# Patient Record
Sex: FEMALE | NOT HISPANIC OR LATINO | Employment: OTHER | URBAN - METROPOLITAN AREA
[De-identification: names, ages, dates, MRNs, and addresses within clinical notes are randomized per-mention and may not be internally consistent; named-entity substitution may affect disease eponyms.]

---

## 2017-06-12 ENCOUNTER — COMMUNICATION - HEALTHEAST (OUTPATIENT)
Dept: INTERNAL MEDICINE | Facility: CLINIC | Age: 62
End: 2017-06-12

## 2017-06-19 ENCOUNTER — OFFICE VISIT - HEALTHEAST (OUTPATIENT)
Dept: INTERNAL MEDICINE | Facility: CLINIC | Age: 62
End: 2017-06-19

## 2017-06-19 DIAGNOSIS — Z79.899 MEDICATION MANAGEMENT: ICD-10-CM

## 2017-06-19 DIAGNOSIS — I10 ESSENTIAL HYPERTENSION WITH GOAL BLOOD PRESSURE LESS THAN 140/90: ICD-10-CM

## 2017-06-19 DIAGNOSIS — Z85.3 HISTORY OF BREAST CANCER: ICD-10-CM

## 2017-06-19 DIAGNOSIS — Z23 NEED FOR VACCINATION: ICD-10-CM

## 2017-06-19 DIAGNOSIS — Z86.0101 HX OF ADENOMATOUS COLONIC POLYPS: ICD-10-CM

## 2017-06-19 DIAGNOSIS — Z00.00 ROUTINE GENERAL MEDICAL EXAMINATION AT A HEALTH CARE FACILITY: ICD-10-CM

## 2017-06-19 LAB
CHOLEST SERPL-MCNC: 195 MG/DL
FASTING STATUS PATIENT QL REPORTED: YES
HDLC SERPL-MCNC: 63 MG/DL
LDLC SERPL CALC-MCNC: 104 MG/DL
TRIGL SERPL-MCNC: 138 MG/DL

## 2017-06-19 ASSESSMENT — MIFFLIN-ST. JEOR: SCORE: 1249.52

## 2017-06-20 ENCOUNTER — AMBULATORY - HEALTHEAST (OUTPATIENT)
Dept: INTERNAL MEDICINE | Facility: CLINIC | Age: 62
End: 2017-06-20

## 2017-06-20 ENCOUNTER — COMMUNICATION - HEALTHEAST (OUTPATIENT)
Dept: INTERNAL MEDICINE | Facility: CLINIC | Age: 62
End: 2017-06-20

## 2017-10-28 ENCOUNTER — COMMUNICATION - HEALTHEAST (OUTPATIENT)
Dept: INTERNAL MEDICINE | Facility: CLINIC | Age: 62
End: 2017-10-28

## 2017-10-28 DIAGNOSIS — E87.6 HYPOKALEMIA: ICD-10-CM

## 2017-12-29 ENCOUNTER — TRANSFERRED RECORDS (OUTPATIENT)
Dept: HEALTH INFORMATION MANAGEMENT | Facility: CLINIC | Age: 62
End: 2017-12-29

## 2018-06-25 ENCOUNTER — AMBULATORY - HEALTHEAST (OUTPATIENT)
Dept: INTERNAL MEDICINE | Facility: CLINIC | Age: 63
End: 2018-06-25

## 2018-06-25 ENCOUNTER — OFFICE VISIT - HEALTHEAST (OUTPATIENT)
Dept: INTERNAL MEDICINE | Facility: CLINIC | Age: 63
End: 2018-06-25

## 2018-06-25 DIAGNOSIS — Z79.899 MEDICATION MANAGEMENT: ICD-10-CM

## 2018-06-25 DIAGNOSIS — E55.9 VITAMIN D DEFICIENCY: ICD-10-CM

## 2018-06-25 DIAGNOSIS — Z00.00 HEALTHCARE MAINTENANCE: ICD-10-CM

## 2018-06-25 DIAGNOSIS — Z00.00 ROUTINE GENERAL MEDICAL EXAMINATION AT A HEALTH CARE FACILITY: ICD-10-CM

## 2018-06-25 DIAGNOSIS — Z85.3 HISTORY OF BREAST CANCER: ICD-10-CM

## 2018-06-25 DIAGNOSIS — I10 ESSENTIAL HYPERTENSION: ICD-10-CM

## 2018-06-25 DIAGNOSIS — Z86.0101 HX OF ADENOMATOUS COLONIC POLYPS: ICD-10-CM

## 2018-06-25 LAB
ALBUMIN SERPL-MCNC: 3.9 G/DL (ref 3.5–5)
ALBUMIN UR-MCNC: NEGATIVE MG/DL
ALP SERPL-CCNC: 96 U/L (ref 45–120)
ALT SERPL W P-5'-P-CCNC: 20 U/L (ref 0–45)
ANION GAP SERPL CALCULATED.3IONS-SCNC: 9 MMOL/L (ref 5–18)
APPEARANCE UR: CLEAR
AST SERPL W P-5'-P-CCNC: 19 U/L (ref 0–40)
ATRIAL RATE - MUSE: 96 BPM
BACTERIA #/AREA URNS HPF: ABNORMAL HPF
BASOPHILS # BLD AUTO: 0.1 THOU/UL (ref 0–0.2)
BASOPHILS NFR BLD AUTO: 2 % (ref 0–2)
BILIRUB SERPL-MCNC: 0.7 MG/DL (ref 0–1)
BILIRUB UR QL STRIP: NEGATIVE
BUN SERPL-MCNC: 13 MG/DL (ref 8–22)
CALCIUM SERPL-MCNC: 9.7 MG/DL (ref 8.5–10.5)
CHLORIDE BLD-SCNC: 106 MMOL/L (ref 98–107)
CHOLEST SERPL-MCNC: 185 MG/DL
CO2 SERPL-SCNC: 27 MMOL/L (ref 22–31)
COLOR UR AUTO: YELLOW
CREAT SERPL-MCNC: 0.78 MG/DL (ref 0.6–1.1)
DIASTOLIC BLOOD PRESSURE - MUSE: NORMAL MMHG
EOSINOPHIL # BLD AUTO: 0.1 THOU/UL (ref 0–0.4)
EOSINOPHIL NFR BLD AUTO: 4 % (ref 0–6)
ERYTHROCYTE [DISTWIDTH] IN BLOOD BY AUTOMATED COUNT: 13.4 % (ref 11–14.5)
FASTING STATUS PATIENT QL REPORTED: YES
GFR SERPL CREATININE-BSD FRML MDRD: >60 ML/MIN/1.73M2
GLUCOSE BLD-MCNC: 110 MG/DL (ref 70–125)
GLUCOSE UR STRIP-MCNC: NEGATIVE MG/DL
HCT VFR BLD AUTO: 46.8 % (ref 35–47)
HDLC SERPL-MCNC: 66 MG/DL
HGB BLD-MCNC: 14.8 G/DL (ref 12–16)
HGB UR QL STRIP: ABNORMAL
INTERPRETATION ECG - MUSE: NORMAL
KETONES UR STRIP-MCNC: NEGATIVE MG/DL
LDLC SERPL CALC-MCNC: 95 MG/DL
LEUKOCYTE ESTERASE UR QL STRIP: NEGATIVE
LYMPHOCYTES # BLD AUTO: 1.2 THOU/UL (ref 0.8–4.4)
LYMPHOCYTES NFR BLD AUTO: 34 % (ref 20–40)
MCH RBC QN AUTO: 28.9 PG (ref 27–34)
MCHC RBC AUTO-ENTMCNC: 31.6 G/DL (ref 32–36)
MCV RBC AUTO: 91 FL (ref 80–100)
MONOCYTES # BLD AUTO: 0.3 THOU/UL (ref 0–0.9)
MONOCYTES NFR BLD AUTO: 9 % (ref 2–10)
NEUTROPHILS # BLD AUTO: 1.8 THOU/UL (ref 2–7.7)
NEUTROPHILS NFR BLD AUTO: 52 % (ref 50–70)
NITRATE UR QL: NEGATIVE
P AXIS - MUSE: 47 DEGREES
PH UR STRIP: 6 [PH] (ref 5–8)
PLATELET # BLD AUTO: 250 THOU/UL (ref 140–440)
PMV BLD AUTO: 9.9 FL (ref 8.5–12.5)
POTASSIUM BLD-SCNC: 3.8 MMOL/L (ref 3.5–5)
PR INTERVAL - MUSE: 116 MS
PROT SERPL-MCNC: 7.6 G/DL (ref 6–8)
QRS DURATION - MUSE: 82 MS
QT - MUSE: 460 MS
QTC - MUSE: 482 MS
R AXIS - MUSE: 37 DEGREES
RBC # BLD AUTO: 5.12 MILL/UL (ref 3.8–5.4)
RBC #/AREA URNS AUTO: ABNORMAL HPF
SODIUM SERPL-SCNC: 142 MMOL/L (ref 136–145)
SP GR UR STRIP: 1.01 (ref 1–1.03)
SQUAMOUS #/AREA URNS AUTO: ABNORMAL LPF
SYSTOLIC BLOOD PRESSURE - MUSE: NORMAL MMHG
T AXIS - MUSE: 133 DEGREES
TRIGL SERPL-MCNC: 119 MG/DL
TSH SERPL DL<=0.005 MIU/L-ACNC: 1.31 UIU/ML (ref 0.3–5)
UROBILINOGEN UR STRIP-ACNC: ABNORMAL
VENTRICULAR RATE- MUSE: 66 BPM
WBC #/AREA URNS AUTO: ABNORMAL HPF
WBC: 3.4 THOU/UL (ref 4–11)

## 2018-06-25 ASSESSMENT — MIFFLIN-ST. JEOR: SCORE: 1263.13

## 2018-06-26 ENCOUNTER — COMMUNICATION - HEALTHEAST (OUTPATIENT)
Dept: INTERNAL MEDICINE | Facility: CLINIC | Age: 63
End: 2018-06-26

## 2018-06-26 ENCOUNTER — AMBULATORY - HEALTHEAST (OUTPATIENT)
Dept: INTERNAL MEDICINE | Facility: CLINIC | Age: 63
End: 2018-06-26

## 2018-06-26 DIAGNOSIS — I25.10 ASHD (ARTERIOSCLEROTIC HEART DISEASE): ICD-10-CM

## 2018-06-26 LAB — 25(OH)D3 SERPL-MCNC: 34.4 NG/ML (ref 30–80)

## 2018-07-10 ENCOUNTER — HOSPITAL ENCOUNTER (OUTPATIENT)
Dept: CARDIOLOGY | Facility: CLINIC | Age: 63
Discharge: HOME | End: 2018-07-10
Attending: INTERNAL MEDICINE

## 2018-07-10 DIAGNOSIS — I25.10 ASHD (ARTERIOSCLEROTIC HEART DISEASE): ICD-10-CM

## 2018-07-10 LAB
AORTIC ROOT: 3.1 CM
BSA FOR ECHO PROCEDURE: 1.82 M2
CV BLOOD PRESSURE: NORMAL MMHG
CV ECHO HEIGHT: 62.8 IN
CV ECHO WEIGHT: 166 LBS
DOP CALC LVOT PEAK VEL: 97.8 CM/S
DOP CALCLVOT PEAK VEL VTI: 21.5 CM
EJECTION FRACTION: 60 %
EJECTION FRACTION: 63 % (ref 55–75)
FRACTIONAL SHORTENING: 44.3 % (ref 28–44)
INTERVENTRICULAR SEPTUM IN END DIASTOLE: 1.21 CM (ref 0.6–0.9)
IVS/PW RATIO: 1.1
LA AREA 1: 13.9 CM2
LA AREA 2: 13.4 CM2
LEFT ATRIUM LENGTH: 4.91 CM
LEFT ATRIUM SIZE: 3.2 CM
LEFT ATRIUM TO AORTIC ROOT RATIO: 1.03 NO UNITS
LEFT ATRIUM VOLUME INDEX: 17.7 ML/M2
LEFT ATRIUM VOLUME: 32.2 ML
LEFT VENTRICLE DIASTOLIC VOLUME INDEX: 25.3 CM3/M2 (ref 34–74)
LEFT VENTRICLE DIASTOLIC VOLUME: 46 CM3 (ref 46–106)
LEFT VENTRICLE HEART RATE: 60 BPM
LEFT VENTRICLE MASS INDEX: 90.9 G/M2
LEFT VENTRICLE SYSTOLIC VOLUME INDEX: 9.3 CM3/M2 (ref 11–31)
LEFT VENTRICLE SYSTOLIC VOLUME: 17 CM3 (ref 14–42)
LEFT VENTRICULAR INTERNAL DIMENSION IN DIASTOLE: 4.22 CM (ref 3.8–5.2)
LEFT VENTRICULAR INTERNAL DIMENSION IN SYSTOLE: 2.35 CM (ref 2.2–3.5)
LEFT VENTRICULAR MASS: 165.5 G
LEFT VENTRICULAR OUTFLOW TRACT MEAN GRADIENT: 3 MMHG
LEFT VENTRICULAR OUTFLOW TRACT MEAN VELOCITY: 80.5 CM/S
LEFT VENTRICULAR OUTFLOW TRACT PEAK GRADIENT: 4 MMHG
LEFT VENTRICULAR POSTERIOR WALL IN END DIASTOLE: 1.06 CM (ref 0.6–0.9)
MITRAL VALVE E/A RATIO: 0.7
MV AVERAGE E/E' RATIO: 7 CM/S
MV DECELERATION TIME: 285 MS
MV E'TISSUE VEL-LAT: 7.12 CM/S
MV E'TISSUE VEL-MED: 9.07 CM/S
MV LATERAL E/E' RATIO: 8
MV MEDIAL E/E' RATIO: 6.3
MV PEAK A VELOCITY: 80.5 CM/S
MV PEAK E VELOCITY: 56.8 CM/S
NUC REST DIASTOLIC VOLUME INDEX: 2656 LBS
NUC REST SYSTOLIC VOLUME INDEX: 62.75 IN
TRICUSPID REGURGITATION PEAK PRESSURE GRADIENT: 17.3 MMHG
TRICUSPID VALVE ANULAR PLANE SYSTOLIC EXCURSION: 2.4 CM
TRICUSPID VALVE PEAK REGURGITANT VELOCITY: 208 CM/S

## 2018-07-10 ASSESSMENT — MIFFLIN-ST. JEOR: SCORE: 1263.13

## 2018-07-19 ENCOUNTER — COMMUNICATION - HEALTHEAST (OUTPATIENT)
Dept: INTERNAL MEDICINE | Facility: CLINIC | Age: 63
End: 2018-07-19

## 2018-09-15 ENCOUNTER — COMMUNICATION - HEALTHEAST (OUTPATIENT)
Dept: INTERNAL MEDICINE | Facility: CLINIC | Age: 63
End: 2018-09-15

## 2019-04-28 ENCOUNTER — COMMUNICATION - HEALTHEAST (OUTPATIENT)
Dept: INTERNAL MEDICINE | Facility: CLINIC | Age: 64
End: 2019-04-28

## 2019-04-28 DIAGNOSIS — I10 ESSENTIAL HYPERTENSION: ICD-10-CM

## 2019-04-29 ENCOUNTER — COMMUNICATION - HEALTHEAST (OUTPATIENT)
Dept: INTERNAL MEDICINE | Facility: CLINIC | Age: 64
End: 2019-04-29

## 2019-06-17 ENCOUNTER — COMMUNICATION - HEALTHEAST (OUTPATIENT)
Dept: INTERNAL MEDICINE | Facility: CLINIC | Age: 64
End: 2019-06-17

## 2019-06-17 ENCOUNTER — OFFICE VISIT - HEALTHEAST (OUTPATIENT)
Dept: INTERNAL MEDICINE | Facility: CLINIC | Age: 64
End: 2019-06-17

## 2019-06-17 DIAGNOSIS — Z86.0101 HX OF ADENOMATOUS COLONIC POLYPS: ICD-10-CM

## 2019-06-17 DIAGNOSIS — I10 ESSENTIAL HYPERTENSION: ICD-10-CM

## 2019-06-17 DIAGNOSIS — L68.0 HIRSUTISM: ICD-10-CM

## 2019-06-17 DIAGNOSIS — Z85.3 HISTORY OF BREAST CANCER: ICD-10-CM

## 2019-06-17 DIAGNOSIS — Z00.00 ROUTINE GENERAL MEDICAL EXAMINATION AT A HEALTH CARE FACILITY: ICD-10-CM

## 2019-06-17 DIAGNOSIS — Z79.899 MEDICATION MANAGEMENT: ICD-10-CM

## 2019-06-17 LAB
ALBUMIN SERPL-MCNC: 4.2 G/DL (ref 3.5–5)
ALBUMIN UR-MCNC: NEGATIVE MG/DL
ALP SERPL-CCNC: 107 U/L (ref 45–120)
ALT SERPL W P-5'-P-CCNC: 22 U/L (ref 0–45)
ANION GAP SERPL CALCULATED.3IONS-SCNC: 12 MMOL/L (ref 5–18)
APPEARANCE UR: CLEAR
AST SERPL W P-5'-P-CCNC: 20 U/L (ref 0–40)
BACTERIA #/AREA URNS HPF: ABNORMAL HPF
BASOPHILS # BLD AUTO: 0.1 THOU/UL (ref 0–0.2)
BASOPHILS NFR BLD AUTO: 2 % (ref 0–2)
BILIRUB SERPL-MCNC: 0.7 MG/DL (ref 0–1)
BILIRUB UR QL STRIP: NEGATIVE
BUN SERPL-MCNC: 13 MG/DL (ref 8–22)
CALCIUM SERPL-MCNC: 10.1 MG/DL (ref 8.5–10.5)
CHLORIDE BLD-SCNC: 102 MMOL/L (ref 98–107)
CHOLEST SERPL-MCNC: 208 MG/DL
CO2 SERPL-SCNC: 27 MMOL/L (ref 22–31)
COLOR UR AUTO: YELLOW
CREAT SERPL-MCNC: 0.84 MG/DL (ref 0.6–1.1)
EOSINOPHIL # BLD AUTO: 0.1 THOU/UL (ref 0–0.4)
EOSINOPHIL NFR BLD AUTO: 2 % (ref 0–6)
ERYTHROCYTE [DISTWIDTH] IN BLOOD BY AUTOMATED COUNT: 13.4 % (ref 11–14.5)
FASTING STATUS PATIENT QL REPORTED: YES
GFR SERPL CREATININE-BSD FRML MDRD: >60 ML/MIN/1.73M2
GLUCOSE BLD-MCNC: 114 MG/DL (ref 70–125)
GLUCOSE UR STRIP-MCNC: NEGATIVE MG/DL
HCT VFR BLD AUTO: 48.8 % (ref 35–47)
HDLC SERPL-MCNC: 71 MG/DL
HGB BLD-MCNC: 15.6 G/DL (ref 12–16)
HGB UR QL STRIP: ABNORMAL
KETONES UR STRIP-MCNC: NEGATIVE MG/DL
LDLC SERPL CALC-MCNC: 114 MG/DL
LEUKOCYTE ESTERASE UR QL STRIP: NEGATIVE
LYMPHOCYTES # BLD AUTO: 1.2 THOU/UL (ref 0.8–4.4)
LYMPHOCYTES NFR BLD AUTO: 36 % (ref 20–40)
MCH RBC QN AUTO: 28.8 PG (ref 27–34)
MCHC RBC AUTO-ENTMCNC: 32 G/DL (ref 32–36)
MCV RBC AUTO: 90 FL (ref 80–100)
MONOCYTES # BLD AUTO: 0.3 THOU/UL (ref 0–0.9)
MONOCYTES NFR BLD AUTO: 7 % (ref 2–10)
NEUTROPHILS # BLD AUTO: 1.8 THOU/UL (ref 2–7.7)
NEUTROPHILS NFR BLD AUTO: 53 % (ref 50–70)
NITRATE UR QL: NEGATIVE
PH UR STRIP: 6.5 [PH] (ref 5–8)
PLATELET # BLD AUTO: 280 THOU/UL (ref 140–440)
PMV BLD AUTO: 9.6 FL (ref 8.5–12.5)
POTASSIUM BLD-SCNC: 3.2 MMOL/L (ref 3.5–5)
PROT SERPL-MCNC: 7.9 G/DL (ref 6–8)
RBC # BLD AUTO: 5.41 MILL/UL (ref 3.8–5.4)
RBC #/AREA URNS AUTO: ABNORMAL HPF
SODIUM SERPL-SCNC: 141 MMOL/L (ref 136–145)
SP GR UR STRIP: 1.01 (ref 1–1.03)
SQUAMOUS #/AREA URNS AUTO: ABNORMAL LPF
TRIGL SERPL-MCNC: 115 MG/DL
TSH SERPL DL<=0.005 MIU/L-ACNC: 0.93 UIU/ML (ref 0.3–5)
UROBILINOGEN UR STRIP-ACNC: ABNORMAL
WBC #/AREA URNS AUTO: ABNORMAL HPF
WBC: 3.5 THOU/UL (ref 4–11)

## 2019-06-17 ASSESSMENT — MIFFLIN-ST. JEOR: SCORE: 1250.09

## 2019-06-18 ENCOUNTER — COMMUNICATION - HEALTHEAST (OUTPATIENT)
Dept: INTERNAL MEDICINE | Facility: CLINIC | Age: 64
End: 2019-06-18

## 2019-07-31 ENCOUNTER — COMMUNICATION - HEALTHEAST (OUTPATIENT)
Dept: INTERNAL MEDICINE | Facility: CLINIC | Age: 64
End: 2019-07-31

## 2019-07-31 DIAGNOSIS — I10 ESSENTIAL HYPERTENSION: ICD-10-CM

## 2019-08-16 ENCOUNTER — TRANSFERRED RECORDS (OUTPATIENT)
Dept: HEALTH INFORMATION MANAGEMENT | Facility: CLINIC | Age: 64
End: 2019-08-16

## 2019-08-16 ENCOUNTER — RECORDS - HEALTHEAST (OUTPATIENT)
Dept: ADMINISTRATIVE | Facility: OTHER | Age: 64
End: 2019-08-16

## 2019-08-21 ENCOUNTER — COMMUNICATION - HEALTHEAST (OUTPATIENT)
Dept: INTERNAL MEDICINE | Facility: CLINIC | Age: 64
End: 2019-08-21

## 2019-08-23 ENCOUNTER — TRANSFERRED RECORDS (OUTPATIENT)
Dept: HEALTH INFORMATION MANAGEMENT | Facility: CLINIC | Age: 64
End: 2019-08-23

## 2019-08-23 ENCOUNTER — RECORDS - HEALTHEAST (OUTPATIENT)
Dept: ADMINISTRATIVE | Facility: OTHER | Age: 64
End: 2019-08-23

## 2019-08-26 ENCOUNTER — RECORDS - HEALTHEAST (OUTPATIENT)
Dept: ADMINISTRATIVE | Facility: OTHER | Age: 64
End: 2019-08-26

## 2019-08-29 ENCOUNTER — AMBULATORY - HEALTHEAST (OUTPATIENT)
Dept: INTERNAL MEDICINE | Facility: CLINIC | Age: 64
End: 2019-08-29

## 2019-08-29 ENCOUNTER — COMMUNICATION - HEALTHEAST (OUTPATIENT)
Dept: SURGERY | Facility: CLINIC | Age: 64
End: 2019-08-29

## 2019-08-29 DIAGNOSIS — C50.912 MALIGNANT NEOPLASM OF LEFT FEMALE BREAST, UNSPECIFIED ESTROGEN RECEPTOR STATUS, UNSPECIFIED SITE OF BREAST (H): ICD-10-CM

## 2019-08-30 ENCOUNTER — RECORDS - HEALTHEAST (OUTPATIENT)
Dept: RADIOLOGY | Facility: CLINIC | Age: 64
End: 2019-08-30

## 2019-08-30 ENCOUNTER — RECORDS - HEALTHEAST (OUTPATIENT)
Dept: ADMINISTRATIVE | Facility: OTHER | Age: 64
End: 2019-08-30

## 2019-09-10 ENCOUNTER — COMMUNICATION - HEALTHEAST (OUTPATIENT)
Dept: SURGERY | Facility: CLINIC | Age: 64
End: 2019-09-10

## 2019-09-10 ENCOUNTER — HOSPITAL ENCOUNTER (OUTPATIENT)
Dept: SURGERY | Facility: CLINIC | Age: 64
Discharge: HOME | End: 2019-09-10
Attending: INTERNAL MEDICINE

## 2019-09-10 DIAGNOSIS — Z17.0 MALIGNANT NEOPLASM OF UPPER-OUTER QUADRANT OF LEFT BREAST IN FEMALE, ESTROGEN RECEPTOR POSITIVE (H): ICD-10-CM

## 2019-09-10 DIAGNOSIS — C50.412 MALIGNANT NEOPLASM OF UPPER-OUTER QUADRANT OF LEFT BREAST IN FEMALE, ESTROGEN RECEPTOR POSITIVE (H): ICD-10-CM

## 2019-09-10 ASSESSMENT — MIFFLIN-ST. JEOR: SCORE: 1235.57

## 2019-09-16 ENCOUNTER — COMMUNICATION - HEALTHEAST (OUTPATIENT)
Dept: SURGERY | Facility: CLINIC | Age: 64
End: 2019-09-16

## 2019-09-20 ASSESSMENT — MIFFLIN-ST. JEOR: SCORE: 1231.95

## 2019-09-23 ENCOUNTER — OFFICE VISIT - HEALTHEAST (OUTPATIENT)
Dept: INTERNAL MEDICINE | Facility: CLINIC | Age: 64
End: 2019-09-23

## 2019-09-23 ENCOUNTER — COMMUNICATION - HEALTHEAST (OUTPATIENT)
Dept: SURGERY | Facility: CLINIC | Age: 64
End: 2019-09-23

## 2019-09-23 DIAGNOSIS — C50.919 MALIGNANT NEOPLASM OF FEMALE BREAST, UNSPECIFIED ESTROGEN RECEPTOR STATUS, UNSPECIFIED LATERALITY, UNSPECIFIED SITE OF BREAST (H): ICD-10-CM

## 2019-09-23 DIAGNOSIS — I10 ESSENTIAL HYPERTENSION: ICD-10-CM

## 2019-09-23 DIAGNOSIS — Z01.818 PREOP GENERAL PHYSICAL EXAM: ICD-10-CM

## 2019-09-23 LAB
ANION GAP SERPL CALCULATED.3IONS-SCNC: 9 MMOL/L (ref 5–18)
ATRIAL RATE - MUSE: 62 BPM
BUN SERPL-MCNC: 16 MG/DL (ref 8–22)
CALCIUM SERPL-MCNC: 10.1 MG/DL (ref 8.5–10.5)
CHLORIDE BLD-SCNC: 105 MMOL/L (ref 98–107)
CO2 SERPL-SCNC: 28 MMOL/L (ref 22–31)
CREAT SERPL-MCNC: 0.94 MG/DL (ref 0.6–1.1)
DIASTOLIC BLOOD PRESSURE - MUSE: NORMAL
ERYTHROCYTE [DISTWIDTH] IN BLOOD BY AUTOMATED COUNT: 13.2 % (ref 11–14.5)
GFR SERPL CREATININE-BSD FRML MDRD: 60 ML/MIN/1.73M2
GLUCOSE BLD-MCNC: 113 MG/DL (ref 70–125)
HCT VFR BLD AUTO: 48.2 % (ref 35–47)
HGB BLD-MCNC: 16 G/DL (ref 12–16)
INTERPRETATION ECG - MUSE: NORMAL
MCH RBC QN AUTO: 29.6 PG (ref 27–34)
MCHC RBC AUTO-ENTMCNC: 33.1 G/DL (ref 32–36)
MCV RBC AUTO: 89 FL (ref 80–100)
P AXIS - MUSE: 48 DEGREES
PLATELET # BLD AUTO: 261 THOU/UL (ref 140–440)
PMV BLD AUTO: 7.7 FL (ref 7–10)
POTASSIUM BLD-SCNC: 3.6 MMOL/L (ref 3.5–5)
PR INTERVAL - MUSE: 120 MS
QRS DURATION - MUSE: 88 MS
QT - MUSE: 436 MS
QTC - MUSE: 442 MS
R AXIS - MUSE: 23 DEGREES
RBC # BLD AUTO: 5.39 MILL/UL (ref 3.8–5.4)
SODIUM SERPL-SCNC: 142 MMOL/L (ref 136–145)
SYSTOLIC BLOOD PRESSURE - MUSE: NORMAL
T AXIS - MUSE: 101 DEGREES
VENTRICULAR RATE- MUSE: 62 BPM
WBC: 3.5 THOU/UL (ref 4–11)

## 2019-09-23 ASSESSMENT — MIFFLIN-ST. JEOR: SCORE: 1227.41

## 2019-09-24 ENCOUNTER — COMMUNICATION - HEALTHEAST (OUTPATIENT)
Dept: INTERNAL MEDICINE | Facility: CLINIC | Age: 64
End: 2019-09-24

## 2019-09-24 ENCOUNTER — ANESTHESIA - HEALTHEAST (OUTPATIENT)
Dept: SURGERY | Facility: AMBULATORY SURGERY CENTER | Age: 64
End: 2019-09-24

## 2019-09-25 ENCOUNTER — HOSPITAL ENCOUNTER (OUTPATIENT)
Dept: MAMMOGRAPHY | Facility: CLINIC | Age: 64
Discharge: HOME | End: 2019-09-25
Attending: SPECIALIST

## 2019-09-25 ENCOUNTER — SURGERY - HEALTHEAST (OUTPATIENT)
Dept: SURGERY | Facility: AMBULATORY SURGERY CENTER | Age: 64
End: 2019-09-25

## 2019-09-25 ENCOUNTER — HOSPITAL ENCOUNTER (OUTPATIENT)
Dept: NUCLEAR MEDICINE | Facility: HOSPITAL | Age: 64
Discharge: HOME | End: 2019-09-25
Attending: SPECIALIST | Admitting: RADIOLOGY

## 2019-09-25 DIAGNOSIS — C50.412 MALIGNANT NEOPLASM OF UPPER-OUTER QUADRANT OF LEFT BREAST IN FEMALE, ESTROGEN RECEPTOR POSITIVE (H): ICD-10-CM

## 2019-09-25 DIAGNOSIS — Z17.0 MALIGNANT NEOPLASM OF UPPER-OUTER QUADRANT OF LEFT BREAST IN FEMALE, ESTROGEN RECEPTOR POSITIVE (H): ICD-10-CM

## 2019-09-25 ASSESSMENT — MIFFLIN-ST. JEOR: SCORE: 1231.95

## 2019-09-30 ENCOUNTER — AMBULATORY - HEALTHEAST (OUTPATIENT)
Dept: SURGERY | Facility: CLINIC | Age: 64
End: 2019-09-30

## 2019-09-30 ENCOUNTER — COMMUNICATION - HEALTHEAST (OUTPATIENT)
Dept: SURGERY | Facility: CLINIC | Age: 64
End: 2019-09-30

## 2019-10-01 ENCOUNTER — COMMUNICATION - HEALTHEAST (OUTPATIENT)
Dept: SCHEDULING | Facility: CLINIC | Age: 64
End: 2019-10-01

## 2019-10-01 ENCOUNTER — RECORDS - HEALTHEAST (OUTPATIENT)
Dept: ADMINISTRATIVE | Facility: OTHER | Age: 64
End: 2019-10-01

## 2019-10-02 ENCOUNTER — AMBULATORY - HEALTHEAST (OUTPATIENT)
Dept: SURGERY | Facility: CLINIC | Age: 64
End: 2019-10-02

## 2019-10-08 ENCOUNTER — COMMUNICATION - HEALTHEAST (OUTPATIENT)
Dept: ONCOLOGY | Facility: CLINIC | Age: 64
End: 2019-10-08

## 2019-10-08 ENCOUNTER — HOSPITAL ENCOUNTER (OUTPATIENT)
Dept: SURGERY | Facility: CLINIC | Age: 64
Discharge: HOME | End: 2019-10-08
Attending: SPECIALIST

## 2019-10-08 DIAGNOSIS — Z17.0 MALIGNANT NEOPLASM OF UPPER-OUTER QUADRANT OF LEFT BREAST IN FEMALE, ESTROGEN RECEPTOR POSITIVE (H): ICD-10-CM

## 2019-10-08 DIAGNOSIS — C50.412 MALIGNANT NEOPLASM OF UPPER-OUTER QUADRANT OF LEFT BREAST IN FEMALE, ESTROGEN RECEPTOR POSITIVE (H): ICD-10-CM

## 2019-10-15 ENCOUNTER — AMBULATORY - HEALTHEAST (OUTPATIENT)
Dept: ONCOLOGY | Facility: CLINIC | Age: 64
End: 2019-10-15

## 2019-10-15 ENCOUNTER — OFFICE VISIT - HEALTHEAST (OUTPATIENT)
Dept: ONCOLOGY | Facility: CLINIC | Age: 64
End: 2019-10-15

## 2019-10-15 DIAGNOSIS — Z17.0 MALIGNANT NEOPLASM OF UPPER-OUTER QUADRANT OF LEFT BREAST IN FEMALE, ESTROGEN RECEPTOR POSITIVE (H): ICD-10-CM

## 2019-10-15 DIAGNOSIS — C50.412 MALIGNANT NEOPLASM OF UPPER-OUTER QUADRANT OF LEFT BREAST IN FEMALE, ESTROGEN RECEPTOR POSITIVE (H): ICD-10-CM

## 2019-10-15 ASSESSMENT — MIFFLIN-ST. JEOR: SCORE: 1239.89

## 2019-10-17 ENCOUNTER — HOSPITAL ENCOUNTER (OUTPATIENT)
Dept: PET IMAGING | Facility: HOSPITAL | Age: 64
Setting detail: RADIATION/ONCOLOGY SERIES
Discharge: STILL ADMITTED OR ADMINISTRATIVE | End: 2019-10-17
Attending: INTERNAL MEDICINE

## 2019-10-17 DIAGNOSIS — Z17.0 MALIGNANT NEOPLASM OF UPPER-OUTER QUADRANT OF LEFT BREAST IN FEMALE, ESTROGEN RECEPTOR POSITIVE (H): ICD-10-CM

## 2019-10-17 DIAGNOSIS — C50.412 MALIGNANT NEOPLASM OF UPPER-OUTER QUADRANT OF LEFT BREAST IN FEMALE, ESTROGEN RECEPTOR POSITIVE (H): ICD-10-CM

## 2019-10-17 LAB — GLUCOSE BLDC GLUCOMTR-MCNC: 92 MG/DL (ref 70–139)

## 2019-10-22 ENCOUNTER — COMMUNICATION - HEALTHEAST (OUTPATIENT)
Dept: ADMINISTRATIVE | Facility: HOSPITAL | Age: 64
End: 2019-10-22

## 2019-10-22 ENCOUNTER — HOSPITAL ENCOUNTER (OUTPATIENT)
Dept: SURGERY | Facility: CLINIC | Age: 64
Discharge: HOME | End: 2019-10-22
Attending: SPECIALIST

## 2019-10-22 DIAGNOSIS — Z17.0 MALIGNANT NEOPLASM OF UPPER-OUTER QUADRANT OF LEFT BREAST IN FEMALE, ESTROGEN RECEPTOR POSITIVE (H): ICD-10-CM

## 2019-10-22 DIAGNOSIS — C50.412 MALIGNANT NEOPLASM OF UPPER-OUTER QUADRANT OF LEFT BREAST IN FEMALE, ESTROGEN RECEPTOR POSITIVE (H): ICD-10-CM

## 2019-10-23 ENCOUNTER — AMBULATORY - HEALTHEAST (OUTPATIENT)
Dept: ONCOLOGY | Facility: CLINIC | Age: 64
End: 2019-10-23

## 2019-10-23 ENCOUNTER — COMMUNICATION - HEALTHEAST (OUTPATIENT)
Dept: OTHER | Facility: CLINIC | Age: 64
End: 2019-10-23

## 2019-10-23 ENCOUNTER — OFFICE VISIT - HEALTHEAST (OUTPATIENT)
Dept: RADIATION ONCOLOGY | Facility: CLINIC | Age: 64
End: 2019-10-23

## 2019-10-23 ENCOUNTER — COMMUNICATION - HEALTHEAST (OUTPATIENT)
Dept: ONCOLOGY | Facility: CLINIC | Age: 64
End: 2019-10-23

## 2019-10-23 DIAGNOSIS — C50.412 MALIGNANT NEOPLASM OF UPPER-OUTER QUADRANT OF LEFT BREAST IN FEMALE, ESTROGEN RECEPTOR POSITIVE (H): ICD-10-CM

## 2019-10-23 DIAGNOSIS — Z17.0 MALIGNANT NEOPLASM OF UPPER-OUTER QUADRANT OF LEFT BREAST IN FEMALE, ESTROGEN RECEPTOR POSITIVE (H): ICD-10-CM

## 2019-10-23 DIAGNOSIS — C50.919 BREAST CANCER (H): ICD-10-CM

## 2019-10-29 ENCOUNTER — COMMUNICATION - HEALTHEAST (OUTPATIENT)
Dept: OTHER | Facility: CLINIC | Age: 64
End: 2019-10-29

## 2019-10-29 ENCOUNTER — OFFICE VISIT - HEALTHEAST (OUTPATIENT)
Dept: INTERNAL MEDICINE | Facility: CLINIC | Age: 64
End: 2019-10-29

## 2019-10-29 DIAGNOSIS — I10 ESSENTIAL HYPERTENSION: ICD-10-CM

## 2019-10-29 DIAGNOSIS — R30.0 DYSURIA: ICD-10-CM

## 2019-10-29 LAB
ALBUMIN UR-MCNC: NEGATIVE MG/DL
APPEARANCE UR: ABNORMAL
BILIRUB UR QL STRIP: NEGATIVE
COLOR UR AUTO: YELLOW
GLUCOSE UR STRIP-MCNC: NEGATIVE MG/DL
HGB UR QL STRIP: NEGATIVE
KETONES UR STRIP-MCNC: NEGATIVE MG/DL
LEUKOCYTE ESTERASE UR QL STRIP: NEGATIVE
NITRATE UR QL: NEGATIVE
PH UR STRIP: 7 [PH] (ref 5–8)
SP GR UR STRIP: 1.01 (ref 1–1.03)
UROBILINOGEN UR STRIP-ACNC: ABNORMAL

## 2019-10-29 ASSESSMENT — MIFFLIN-ST. JEOR: SCORE: 1221.74

## 2019-10-31 ENCOUNTER — COMMUNICATION - HEALTHEAST (OUTPATIENT)
Dept: INTERNAL MEDICINE | Facility: CLINIC | Age: 64
End: 2019-10-31

## 2019-10-31 ENCOUNTER — AMBULATORY - HEALTHEAST (OUTPATIENT)
Dept: RADIATION ONCOLOGY | Facility: HOSPITAL | Age: 64
End: 2019-10-31

## 2019-10-31 DIAGNOSIS — I10 ESSENTIAL HYPERTENSION: ICD-10-CM

## 2019-10-31 DIAGNOSIS — Z17.0 MALIGNANT NEOPLASM OF UPPER-OUTER QUADRANT OF LEFT BREAST IN FEMALE, ESTROGEN RECEPTOR POSITIVE (H): ICD-10-CM

## 2019-10-31 DIAGNOSIS — C50.412 MALIGNANT NEOPLASM OF UPPER-OUTER QUADRANT OF LEFT BREAST IN FEMALE, ESTROGEN RECEPTOR POSITIVE (H): ICD-10-CM

## 2019-11-05 ENCOUNTER — COMMUNICATION - HEALTHEAST (OUTPATIENT)
Dept: ONCOLOGY | Facility: CLINIC | Age: 64
End: 2019-11-05

## 2019-11-05 ENCOUNTER — COMMUNICATION - HEALTHEAST (OUTPATIENT)
Dept: OTHER | Facility: CLINIC | Age: 64
End: 2019-11-05

## 2019-11-06 ENCOUNTER — COMMUNICATION - HEALTHEAST (OUTPATIENT)
Dept: ADMINISTRATIVE | Facility: HOSPITAL | Age: 64
End: 2019-11-06

## 2019-11-12 ENCOUNTER — AMBULATORY - HEALTHEAST (OUTPATIENT)
Dept: RADIATION ONCOLOGY | Facility: HOSPITAL | Age: 64
End: 2019-11-12

## 2019-11-13 ENCOUNTER — AMBULATORY - HEALTHEAST (OUTPATIENT)
Dept: OTHER | Facility: CLINIC | Age: 64
End: 2019-11-13

## 2019-11-14 ENCOUNTER — COMMUNICATION - HEALTHEAST (OUTPATIENT)
Dept: OTHER | Facility: CLINIC | Age: 64
End: 2019-11-14

## 2019-11-15 ENCOUNTER — OFFICE VISIT - HEALTHEAST (OUTPATIENT)
Dept: PHYSICAL THERAPY | Facility: REHABILITATION | Age: 64
End: 2019-11-15

## 2019-11-15 DIAGNOSIS — M25.612 DECREASED RANGE OF MOTION OF SHOULDER, LEFT: ICD-10-CM

## 2019-11-15 DIAGNOSIS — I89.0 LYMPHEDEMA: ICD-10-CM

## 2019-11-19 ENCOUNTER — COMMUNICATION - HEALTHEAST (OUTPATIENT)
Dept: ONCOLOGY | Facility: CLINIC | Age: 64
End: 2019-11-19

## 2019-11-19 ENCOUNTER — OFFICE VISIT - HEALTHEAST (OUTPATIENT)
Dept: RADIATION ONCOLOGY | Facility: CLINIC | Age: 64
End: 2019-11-19

## 2019-11-19 ENCOUNTER — AMBULATORY - HEALTHEAST (OUTPATIENT)
Dept: ONCOLOGY | Facility: CLINIC | Age: 64
End: 2019-11-19

## 2019-11-19 DIAGNOSIS — R93.7 ABNORMAL BONE DENSITY SCREENING: ICD-10-CM

## 2019-11-19 DIAGNOSIS — C50.412 MALIGNANT NEOPLASM OF UPPER-OUTER QUADRANT OF LEFT BREAST IN FEMALE, ESTROGEN RECEPTOR POSITIVE (H): ICD-10-CM

## 2019-11-19 DIAGNOSIS — Z17.0 MALIGNANT NEOPLASM OF UPPER-OUTER QUADRANT OF LEFT BREAST IN FEMALE, ESTROGEN RECEPTOR POSITIVE (H): ICD-10-CM

## 2019-11-20 ENCOUNTER — AMBULATORY - HEALTHEAST (OUTPATIENT)
Dept: OTHER | Facility: CLINIC | Age: 64
End: 2019-11-20

## 2019-11-20 ENCOUNTER — COMMUNICATION - HEALTHEAST (OUTPATIENT)
Dept: ADMINISTRATIVE | Facility: HOSPITAL | Age: 64
End: 2019-11-20

## 2019-11-20 ENCOUNTER — OFFICE VISIT - HEALTHEAST (OUTPATIENT)
Dept: PHYSICAL THERAPY | Facility: REHABILITATION | Age: 64
End: 2019-11-20

## 2019-11-20 DIAGNOSIS — M25.612 DECREASED RANGE OF MOTION OF SHOULDER, LEFT: ICD-10-CM

## 2019-11-20 DIAGNOSIS — C50.412 MALIGNANT NEOPLASM OF UPPER-OUTER QUADRANT OF LEFT FEMALE BREAST (H): ICD-10-CM

## 2019-11-20 DIAGNOSIS — Z17.0 ESTROGEN RECEPTOR POSITIVE: ICD-10-CM

## 2019-11-20 DIAGNOSIS — I89.0 LYMPHEDEMA: ICD-10-CM

## 2019-11-21 ENCOUNTER — COMMUNICATION - HEALTHEAST (OUTPATIENT)
Dept: SURGERY | Facility: CLINIC | Age: 64
End: 2019-11-21

## 2019-11-25 ENCOUNTER — COMMUNICATION - HEALTHEAST (OUTPATIENT)
Dept: SURGERY | Facility: CLINIC | Age: 64
End: 2019-11-25

## 2019-11-26 ENCOUNTER — COMMUNICATION - HEALTHEAST (OUTPATIENT)
Dept: INTERNAL MEDICINE | Facility: CLINIC | Age: 64
End: 2019-11-26

## 2019-11-26 ENCOUNTER — OFFICE VISIT - HEALTHEAST (OUTPATIENT)
Dept: RADIATION ONCOLOGY | Facility: CLINIC | Age: 64
End: 2019-11-26

## 2019-11-26 ENCOUNTER — AMBULATORY - HEALTHEAST (OUTPATIENT)
Dept: INTERNAL MEDICINE | Facility: CLINIC | Age: 64
End: 2019-11-26

## 2019-11-26 DIAGNOSIS — I10 ESSENTIAL HYPERTENSION: ICD-10-CM

## 2019-11-26 DIAGNOSIS — C50.412 MALIGNANT NEOPLASM OF UPPER-OUTER QUADRANT OF LEFT BREAST IN FEMALE, ESTROGEN RECEPTOR POSITIVE (H): ICD-10-CM

## 2019-11-26 DIAGNOSIS — Z17.0 MALIGNANT NEOPLASM OF UPPER-OUTER QUADRANT OF LEFT BREAST IN FEMALE, ESTROGEN RECEPTOR POSITIVE (H): ICD-10-CM

## 2019-12-03 ENCOUNTER — AMBULATORY - HEALTHEAST (OUTPATIENT)
Dept: ONCOLOGY | Facility: CLINIC | Age: 64
End: 2019-12-03

## 2019-12-03 ENCOUNTER — HOSPITAL ENCOUNTER (OUTPATIENT)
Dept: SURGERY | Facility: CLINIC | Age: 64
Discharge: HOME | End: 2019-12-03

## 2019-12-03 ENCOUNTER — OFFICE VISIT - HEALTHEAST (OUTPATIENT)
Dept: RADIATION ONCOLOGY | Facility: CLINIC | Age: 64
End: 2019-12-03

## 2019-12-03 DIAGNOSIS — Z17.0 MALIGNANT NEOPLASM OF UPPER-OUTER QUADRANT OF LEFT BREAST IN FEMALE, ESTROGEN RECEPTOR POSITIVE (H): ICD-10-CM

## 2019-12-03 DIAGNOSIS — C50.412 MALIGNANT NEOPLASM OF UPPER-OUTER QUADRANT OF LEFT BREAST IN FEMALE, ESTROGEN RECEPTOR POSITIVE (H): ICD-10-CM

## 2019-12-09 ENCOUNTER — OFFICE VISIT - HEALTHEAST (OUTPATIENT)
Dept: PHYSICAL THERAPY | Facility: REHABILITATION | Age: 64
End: 2019-12-09

## 2019-12-09 DIAGNOSIS — I89.0 LYMPHEDEMA: ICD-10-CM

## 2019-12-09 DIAGNOSIS — M25.612 DECREASED RANGE OF MOTION OF SHOULDER, LEFT: ICD-10-CM

## 2019-12-10 ENCOUNTER — OFFICE VISIT - HEALTHEAST (OUTPATIENT)
Dept: RADIATION ONCOLOGY | Facility: CLINIC | Age: 64
End: 2019-12-10

## 2019-12-10 DIAGNOSIS — C50.412 MALIGNANT NEOPLASM OF UPPER-OUTER QUADRANT OF LEFT BREAST IN FEMALE, ESTROGEN RECEPTOR POSITIVE (H): ICD-10-CM

## 2019-12-10 DIAGNOSIS — Z17.0 MALIGNANT NEOPLASM OF UPPER-OUTER QUADRANT OF LEFT BREAST IN FEMALE, ESTROGEN RECEPTOR POSITIVE (H): ICD-10-CM

## 2019-12-12 ENCOUNTER — OFFICE VISIT - HEALTHEAST (OUTPATIENT)
Dept: PHYSICAL THERAPY | Facility: REHABILITATION | Age: 64
End: 2019-12-12

## 2019-12-12 DIAGNOSIS — I89.0 LYMPHEDEMA: ICD-10-CM

## 2019-12-12 DIAGNOSIS — M25.612 DECREASED RANGE OF MOTION OF SHOULDER, LEFT: ICD-10-CM

## 2019-12-16 ENCOUNTER — OFFICE VISIT - HEALTHEAST (OUTPATIENT)
Dept: PHYSICAL THERAPY | Facility: REHABILITATION | Age: 64
End: 2019-12-16

## 2019-12-16 ENCOUNTER — AMBULATORY - HEALTHEAST (OUTPATIENT)
Dept: RADIATION ONCOLOGY | Facility: CLINIC | Age: 64
End: 2019-12-16

## 2019-12-16 DIAGNOSIS — I89.0 LYMPHEDEMA: ICD-10-CM

## 2019-12-16 DIAGNOSIS — M25.612 DECREASED RANGE OF MOTION OF SHOULDER, LEFT: ICD-10-CM

## 2019-12-17 ENCOUNTER — RECORDS - HEALTHEAST (OUTPATIENT)
Dept: ADMINISTRATIVE | Facility: OTHER | Age: 64
End: 2019-12-17

## 2019-12-17 ENCOUNTER — TRANSFERRED RECORDS (OUTPATIENT)
Dept: MULTI SPECIALTY CLINIC | Facility: CLINIC | Age: 64
End: 2019-12-17

## 2019-12-17 ENCOUNTER — OFFICE VISIT - HEALTHEAST (OUTPATIENT)
Dept: RADIATION ONCOLOGY | Facility: CLINIC | Age: 64
End: 2019-12-17

## 2019-12-17 ENCOUNTER — RECORDS - HEALTHEAST (OUTPATIENT)
Dept: BONE DENSITY | Facility: CLINIC | Age: 64
End: 2019-12-17

## 2019-12-17 DIAGNOSIS — C50.412 MALIGNANT NEOPLASM OF UPPER-OUTER QUADRANT OF LEFT BREAST IN FEMALE, ESTROGEN RECEPTOR POSITIVE (H): ICD-10-CM

## 2019-12-17 DIAGNOSIS — Z17.0 MALIGNANT NEOPLASM OF UPPER-OUTER QUADRANT OF LEFT BREAST IN FEMALE, ESTROGEN RECEPTOR POSITIVE (H): ICD-10-CM

## 2019-12-17 DIAGNOSIS — R93.7 ABNORMAL FINDINGS ON DIAGNOSTIC IMAGING OF OTHER PARTS OF MUSCULOSKELETAL SYSTEM: ICD-10-CM

## 2019-12-18 ENCOUNTER — OFFICE VISIT - HEALTHEAST (OUTPATIENT)
Dept: PHYSICAL THERAPY | Facility: REHABILITATION | Age: 64
End: 2019-12-18

## 2019-12-18 DIAGNOSIS — M25.612 DECREASED RANGE OF MOTION OF SHOULDER, LEFT: ICD-10-CM

## 2019-12-18 DIAGNOSIS — I89.0 LYMPHEDEMA: ICD-10-CM

## 2019-12-19 ENCOUNTER — OFFICE VISIT - HEALTHEAST (OUTPATIENT)
Dept: ONCOLOGY | Facility: CLINIC | Age: 64
End: 2019-12-19

## 2019-12-19 DIAGNOSIS — C50.412 MALIGNANT NEOPLASM OF UPPER-OUTER QUADRANT OF LEFT BREAST IN FEMALE, ESTROGEN RECEPTOR POSITIVE (H): ICD-10-CM

## 2019-12-19 DIAGNOSIS — E56.9 VITAMIN DEFICIENCY: ICD-10-CM

## 2019-12-19 DIAGNOSIS — Z17.0 MALIGNANT NEOPLASM OF UPPER-OUTER QUADRANT OF LEFT BREAST IN FEMALE, ESTROGEN RECEPTOR POSITIVE (H): ICD-10-CM

## 2019-12-20 ENCOUNTER — COMMUNICATION - HEALTHEAST (OUTPATIENT)
Dept: ONCOLOGY | Facility: CLINIC | Age: 64
End: 2019-12-20

## 2019-12-20 LAB — 25(OH)D3 SERPL-MCNC: 31 NG/ML (ref 30–80)

## 2019-12-23 ENCOUNTER — OFFICE VISIT - HEALTHEAST (OUTPATIENT)
Dept: PHYSICAL THERAPY | Facility: REHABILITATION | Age: 64
End: 2019-12-23

## 2019-12-23 ENCOUNTER — AMBULATORY - HEALTHEAST (OUTPATIENT)
Dept: ONCOLOGY | Facility: CLINIC | Age: 64
End: 2019-12-23

## 2019-12-23 DIAGNOSIS — I89.0 LYMPHEDEMA: ICD-10-CM

## 2019-12-23 DIAGNOSIS — M25.612 DECREASED RANGE OF MOTION OF SHOULDER, LEFT: ICD-10-CM

## 2019-12-24 ENCOUNTER — OFFICE VISIT - HEALTHEAST (OUTPATIENT)
Dept: RADIATION ONCOLOGY | Facility: CLINIC | Age: 64
End: 2019-12-24

## 2019-12-24 DIAGNOSIS — Z17.0 MALIGNANT NEOPLASM OF UPPER-OUTER QUADRANT OF LEFT BREAST IN FEMALE, ESTROGEN RECEPTOR POSITIVE (H): ICD-10-CM

## 2019-12-24 DIAGNOSIS — C50.412 MALIGNANT NEOPLASM OF UPPER-OUTER QUADRANT OF LEFT BREAST IN FEMALE, ESTROGEN RECEPTOR POSITIVE (H): ICD-10-CM

## 2019-12-27 ENCOUNTER — AMBULATORY - HEALTHEAST (OUTPATIENT)
Dept: RADIATION ONCOLOGY | Facility: CLINIC | Age: 64
End: 2019-12-27

## 2019-12-27 ENCOUNTER — AMBULATORY - HEALTHEAST (OUTPATIENT)
Dept: ONCOLOGY | Facility: CLINIC | Age: 64
End: 2019-12-27

## 2019-12-27 DIAGNOSIS — C50.919 BREAST CANCER (H): ICD-10-CM

## 2020-01-10 ENCOUNTER — COMMUNICATION - HEALTHEAST (OUTPATIENT)
Dept: RADIATION ONCOLOGY | Facility: CLINIC | Age: 65
End: 2020-01-10

## 2020-01-24 ENCOUNTER — COMMUNICATION - HEALTHEAST (OUTPATIENT)
Dept: ADMINISTRATIVE | Facility: HOSPITAL | Age: 65
End: 2020-01-24

## 2020-01-27 ENCOUNTER — OFFICE VISIT - HEALTHEAST (OUTPATIENT)
Dept: PHYSICAL THERAPY | Facility: REHABILITATION | Age: 65
End: 2020-01-27

## 2020-01-27 DIAGNOSIS — I89.0 LYMPHEDEMA: ICD-10-CM

## 2020-01-27 DIAGNOSIS — M25.612 DECREASED RANGE OF MOTION OF SHOULDER, LEFT: ICD-10-CM

## 2020-02-04 ENCOUNTER — COMMUNICATION - HEALTHEAST (OUTPATIENT)
Dept: ADMINISTRATIVE | Facility: HOSPITAL | Age: 65
End: 2020-02-04

## 2020-02-10 ENCOUNTER — COMMUNICATION - HEALTHEAST (OUTPATIENT)
Dept: ADMINISTRATIVE | Facility: HOSPITAL | Age: 65
End: 2020-02-10

## 2020-02-13 ENCOUNTER — COMMUNICATION - HEALTHEAST (OUTPATIENT)
Dept: ADMINISTRATIVE | Facility: HOSPITAL | Age: 65
End: 2020-02-13

## 2020-02-19 ENCOUNTER — COMMUNICATION - HEALTHEAST (OUTPATIENT)
Dept: SCHEDULING | Facility: CLINIC | Age: 65
End: 2020-02-19

## 2020-02-19 ENCOUNTER — AMBULATORY - HEALTHEAST (OUTPATIENT)
Dept: ONCOLOGY | Facility: CLINIC | Age: 65
End: 2020-02-19

## 2020-02-19 ENCOUNTER — OFFICE VISIT - HEALTHEAST (OUTPATIENT)
Dept: INTERNAL MEDICINE | Facility: CLINIC | Age: 65
End: 2020-02-19

## 2020-02-19 ENCOUNTER — OFFICE VISIT - HEALTHEAST (OUTPATIENT)
Dept: RADIATION ONCOLOGY | Facility: CLINIC | Age: 65
End: 2020-02-19

## 2020-02-19 DIAGNOSIS — I10 ESSENTIAL HYPERTENSION: ICD-10-CM

## 2020-02-19 DIAGNOSIS — C50.412 MALIGNANT NEOPLASM OF UPPER-OUTER QUADRANT OF LEFT BREAST IN FEMALE, ESTROGEN RECEPTOR POSITIVE (H): ICD-10-CM

## 2020-02-19 DIAGNOSIS — Z17.0 MALIGNANT NEOPLASM OF UPPER-OUTER QUADRANT OF LEFT BREAST IN FEMALE, ESTROGEN RECEPTOR POSITIVE (H): ICD-10-CM

## 2020-02-26 ENCOUNTER — COMMUNICATION - HEALTHEAST (OUTPATIENT)
Dept: ONCOLOGY | Facility: CLINIC | Age: 65
End: 2020-02-26

## 2020-03-10 ENCOUNTER — COMMUNICATION - HEALTHEAST (OUTPATIENT)
Dept: ONCOLOGY | Facility: CLINIC | Age: 65
End: 2020-03-10

## 2020-04-14 ENCOUNTER — COMMUNICATION - HEALTHEAST (OUTPATIENT)
Dept: PHYSICAL THERAPY | Facility: CLINIC | Age: 65
End: 2020-04-14

## 2020-04-23 ENCOUNTER — OFFICE VISIT - HEALTHEAST (OUTPATIENT)
Dept: ONCOLOGY | Facility: CLINIC | Age: 65
End: 2020-04-23

## 2020-04-23 ENCOUNTER — COMMUNICATION - HEALTHEAST (OUTPATIENT)
Dept: ONCOLOGY | Facility: CLINIC | Age: 65
End: 2020-04-23

## 2020-04-23 DIAGNOSIS — Z79.811 AROMATASE INHIBITOR USE: ICD-10-CM

## 2020-04-23 DIAGNOSIS — Z17.0 MALIGNANT NEOPLASM OF UPPER-OUTER QUADRANT OF LEFT BREAST IN FEMALE, ESTROGEN RECEPTOR POSITIVE (H): ICD-10-CM

## 2020-04-23 DIAGNOSIS — C50.412 MALIGNANT NEOPLASM OF UPPER-OUTER QUADRANT OF LEFT BREAST IN FEMALE, ESTROGEN RECEPTOR POSITIVE (H): ICD-10-CM

## 2020-05-12 ENCOUNTER — OFFICE VISIT - HEALTHEAST (OUTPATIENT)
Dept: INTERNAL MEDICINE | Facility: CLINIC | Age: 65
End: 2020-05-12

## 2020-05-12 DIAGNOSIS — I10 ESSENTIAL HYPERTENSION: ICD-10-CM

## 2020-05-12 DIAGNOSIS — Z85.3 HISTORY OF BREAST CANCER: ICD-10-CM

## 2020-05-20 ENCOUNTER — COMMUNICATION - HEALTHEAST (OUTPATIENT)
Dept: ONCOLOGY | Facility: CLINIC | Age: 65
End: 2020-05-20

## 2020-06-04 ENCOUNTER — OFFICE VISIT - HEALTHEAST (OUTPATIENT)
Dept: PHYSICAL THERAPY | Facility: REHABILITATION | Age: 65
End: 2020-06-04

## 2020-06-04 DIAGNOSIS — M25.612 DECREASED RANGE OF MOTION OF SHOULDER, LEFT: ICD-10-CM

## 2020-06-04 DIAGNOSIS — I89.0 LYMPHEDEMA: ICD-10-CM

## 2020-07-30 ENCOUNTER — OFFICE VISIT - HEALTHEAST (OUTPATIENT)
Dept: ONCOLOGY | Facility: CLINIC | Age: 65
End: 2020-07-30

## 2020-07-30 DIAGNOSIS — C50.412 MALIGNANT NEOPLASM OF UPPER-OUTER QUADRANT OF LEFT BREAST IN FEMALE, ESTROGEN RECEPTOR POSITIVE (H): ICD-10-CM

## 2020-07-30 DIAGNOSIS — M25.562 ARTHRALGIA OF BOTH KNEES: ICD-10-CM

## 2020-07-30 DIAGNOSIS — Z79.899 MEDICATION MANAGEMENT: ICD-10-CM

## 2020-07-30 DIAGNOSIS — Z17.0 MALIGNANT NEOPLASM OF UPPER-OUTER QUADRANT OF LEFT BREAST IN FEMALE, ESTROGEN RECEPTOR POSITIVE (H): ICD-10-CM

## 2020-07-30 DIAGNOSIS — M25.561 ARTHRALGIA OF BOTH KNEES: ICD-10-CM

## 2020-07-30 ASSESSMENT — MIFFLIN-ST. JEOR: SCORE: 1210.86

## 2020-08-12 ENCOUNTER — OFFICE VISIT - HEALTHEAST (OUTPATIENT)
Dept: INTERNAL MEDICINE | Facility: CLINIC | Age: 65
End: 2020-08-12

## 2020-08-12 DIAGNOSIS — Z86.0101 HX OF ADENOMATOUS COLONIC POLYPS: ICD-10-CM

## 2020-08-12 DIAGNOSIS — I10 ESSENTIAL HYPERTENSION: ICD-10-CM

## 2020-08-12 DIAGNOSIS — Z79.899 MEDICATION MANAGEMENT: ICD-10-CM

## 2020-08-12 DIAGNOSIS — Z11.59 ENCOUNTER FOR HEPATITIS C SCREENING TEST FOR LOW RISK PATIENT: ICD-10-CM

## 2020-08-12 DIAGNOSIS — Z00.00 ROUTINE GENERAL MEDICAL EXAMINATION AT A HEALTH CARE FACILITY: ICD-10-CM

## 2020-08-12 DIAGNOSIS — Z85.3 HISTORY OF BREAST CANCER: ICD-10-CM

## 2020-08-12 LAB
ALBUMIN SERPL-MCNC: 3.9 G/DL (ref 3.5–5)
ALBUMIN UR-MCNC: NEGATIVE MG/DL
ALP SERPL-CCNC: 127 U/L (ref 45–120)
ALT SERPL W P-5'-P-CCNC: 19 U/L (ref 0–45)
ANION GAP SERPL CALCULATED.3IONS-SCNC: 8 MMOL/L (ref 5–18)
APPEARANCE UR: CLEAR
AST SERPL W P-5'-P-CCNC: 16 U/L (ref 0–40)
BASOPHILS # BLD AUTO: 0 THOU/UL (ref 0–0.2)
BASOPHILS NFR BLD AUTO: 1 % (ref 0–2)
BILIRUB SERPL-MCNC: 0.6 MG/DL (ref 0–1)
BILIRUB UR QL STRIP: NEGATIVE
BUN SERPL-MCNC: 12 MG/DL (ref 8–22)
CALCIUM SERPL-MCNC: 9.5 MG/DL (ref 8.5–10.5)
CHLORIDE BLD-SCNC: 106 MMOL/L (ref 98–107)
CHOLEST SERPL-MCNC: 180 MG/DL
CO2 SERPL-SCNC: 29 MMOL/L (ref 22–31)
COLOR UR AUTO: YELLOW
CREAT SERPL-MCNC: 0.77 MG/DL (ref 0.6–1.1)
EOSINOPHIL # BLD AUTO: 0.1 THOU/UL (ref 0–0.4)
EOSINOPHIL NFR BLD AUTO: 5 % (ref 0–6)
ERYTHROCYTE [DISTWIDTH] IN BLOOD BY AUTOMATED COUNT: 13.1 % (ref 11–14.5)
FASTING STATUS PATIENT QL REPORTED: NO
GFR SERPL CREATININE-BSD FRML MDRD: >60 ML/MIN/1.73M2
GLUCOSE BLD-MCNC: 70 MG/DL (ref 70–125)
GLUCOSE UR STRIP-MCNC: NEGATIVE MG/DL
HCT VFR BLD AUTO: 47.2 % (ref 35–47)
HDLC SERPL-MCNC: 61 MG/DL
HGB BLD-MCNC: 15.5 G/DL (ref 12–16)
HGB UR QL STRIP: NEGATIVE
KETONES UR STRIP-MCNC: NEGATIVE MG/DL
LDLC SERPL CALC-MCNC: 81 MG/DL
LEUKOCYTE ESTERASE UR QL STRIP: NEGATIVE
LYMPHOCYTES # BLD AUTO: 0.8 THOU/UL (ref 0.8–4.4)
LYMPHOCYTES NFR BLD AUTO: 32 % (ref 20–40)
MCH RBC QN AUTO: 29.9 PG (ref 27–34)
MCHC RBC AUTO-ENTMCNC: 32.8 G/DL (ref 32–36)
MCV RBC AUTO: 91 FL (ref 80–100)
MONOCYTES # BLD AUTO: 0.2 THOU/UL (ref 0–0.9)
MONOCYTES NFR BLD AUTO: 7 % (ref 2–10)
NEUTROPHILS # BLD AUTO: 1.4 THOU/UL (ref 2–7.7)
NEUTROPHILS NFR BLD AUTO: 56 % (ref 50–70)
NITRATE UR QL: NEGATIVE
PH UR STRIP: 7 [PH] (ref 5–8)
PLATELET # BLD AUTO: 235 THOU/UL (ref 140–440)
PMV BLD AUTO: 7.9 FL (ref 7–10)
POTASSIUM BLD-SCNC: 4 MMOL/L (ref 3.5–5)
PROT SERPL-MCNC: 7.2 G/DL (ref 6–8)
RBC # BLD AUTO: 5.18 MILL/UL (ref 3.8–5.4)
SODIUM SERPL-SCNC: 143 MMOL/L (ref 136–145)
SP GR UR STRIP: 1.02 (ref 1–1.03)
TRIGL SERPL-MCNC: 192 MG/DL
UROBILINOGEN UR STRIP-ACNC: NORMAL
WBC: 2.5 THOU/UL (ref 4–11)

## 2020-08-12 ASSESSMENT — MIFFLIN-ST. JEOR: SCORE: 1199.06

## 2020-08-13 ENCOUNTER — COMMUNICATION - HEALTHEAST (OUTPATIENT)
Dept: INTERNAL MEDICINE | Facility: CLINIC | Age: 65
End: 2020-08-13

## 2020-08-13 LAB — HCV AB SERPL QL IA: NEGATIVE

## 2020-08-14 ENCOUNTER — COMMUNICATION - HEALTHEAST (OUTPATIENT)
Dept: INTERNAL MEDICINE | Facility: CLINIC | Age: 65
End: 2020-08-14

## 2020-08-14 DIAGNOSIS — I10 ESSENTIAL HYPERTENSION: ICD-10-CM

## 2020-10-29 ENCOUNTER — OFFICE VISIT - HEALTHEAST (OUTPATIENT)
Dept: ONCOLOGY | Facility: CLINIC | Age: 65
End: 2020-10-29

## 2020-10-29 ENCOUNTER — COMMUNICATION - HEALTHEAST (OUTPATIENT)
Dept: ONCOLOGY | Facility: CLINIC | Age: 65
End: 2020-10-29

## 2020-10-29 DIAGNOSIS — Z17.0 MALIGNANT NEOPLASM OF UPPER-OUTER QUADRANT OF LEFT BREAST IN FEMALE, ESTROGEN RECEPTOR POSITIVE (H): ICD-10-CM

## 2020-10-29 DIAGNOSIS — C50.412 MALIGNANT NEOPLASM OF UPPER-OUTER QUADRANT OF LEFT BREAST IN FEMALE, ESTROGEN RECEPTOR POSITIVE (H): ICD-10-CM

## 2020-10-30 ENCOUNTER — COMMUNICATION - HEALTHEAST (OUTPATIENT)
Dept: INTERNAL MEDICINE | Facility: CLINIC | Age: 65
End: 2020-10-30

## 2020-10-30 DIAGNOSIS — I10 ESSENTIAL HYPERTENSION: ICD-10-CM

## 2020-11-02 ENCOUNTER — COMMUNICATION - HEALTHEAST (OUTPATIENT)
Dept: INTERNAL MEDICINE | Facility: CLINIC | Age: 65
End: 2020-11-02

## 2020-11-02 DIAGNOSIS — I10 ESSENTIAL HYPERTENSION: ICD-10-CM

## 2020-11-10 ENCOUNTER — COMMUNICATION - HEALTHEAST (OUTPATIENT)
Dept: SURGERY | Facility: CLINIC | Age: 65
End: 2020-11-10

## 2020-11-24 ENCOUNTER — COMMUNICATION - HEALTHEAST (OUTPATIENT)
Dept: ONCOLOGY | Facility: CLINIC | Age: 65
End: 2020-11-24

## 2020-12-07 ENCOUNTER — AMBULATORY - HEALTHEAST (OUTPATIENT)
Dept: OTHER | Facility: CLINIC | Age: 65
End: 2020-12-07

## 2020-12-07 DIAGNOSIS — Z17.0 ESTROGEN RECEPTOR POSITIVE: ICD-10-CM

## 2020-12-07 DIAGNOSIS — C50.412 MALIGNANT NEOPLASM OF UPPER-OUTER QUADRANT OF LEFT FEMALE BREAST (H): ICD-10-CM

## 2020-12-17 ENCOUNTER — HOSPITAL ENCOUNTER (OUTPATIENT)
Dept: SURGERY | Facility: CLINIC | Age: 65
Discharge: HOME | End: 2020-12-17
Attending: SPECIALIST

## 2020-12-17 DIAGNOSIS — Z85.3 PERSONAL HISTORY OF BREAST CANCER: ICD-10-CM

## 2020-12-18 ENCOUNTER — COMMUNICATION - HEALTHEAST (OUTPATIENT)
Dept: ONCOLOGY | Facility: CLINIC | Age: 65
End: 2020-12-18

## 2020-12-22 ENCOUNTER — COMMUNICATION - HEALTHEAST (OUTPATIENT)
Dept: SCHEDULING | Facility: CLINIC | Age: 65
End: 2020-12-22

## 2020-12-23 ENCOUNTER — OFFICE VISIT - HEALTHEAST (OUTPATIENT)
Dept: INTERNAL MEDICINE | Facility: CLINIC | Age: 65
End: 2020-12-23

## 2020-12-23 DIAGNOSIS — I10 ESSENTIAL HYPERTENSION: ICD-10-CM

## 2021-01-12 ENCOUNTER — OFFICE VISIT - HEALTHEAST (OUTPATIENT)
Dept: INTERNAL MEDICINE | Facility: CLINIC | Age: 66
End: 2021-01-12

## 2021-01-12 ENCOUNTER — COMMUNICATION - HEALTHEAST (OUTPATIENT)
Dept: SCHEDULING | Facility: CLINIC | Age: 66
End: 2021-01-12

## 2021-01-12 DIAGNOSIS — I10 ESSENTIAL HYPERTENSION: ICD-10-CM

## 2021-01-12 DIAGNOSIS — E66.3 OVERWEIGHT (BMI 25.0-29.9): ICD-10-CM

## 2021-01-12 ASSESSMENT — MIFFLIN-ST. JEOR: SCORE: 1230.36

## 2021-01-14 ENCOUNTER — OFFICE VISIT - HEALTHEAST (OUTPATIENT)
Dept: INTERNAL MEDICINE | Facility: CLINIC | Age: 66
End: 2021-01-14

## 2021-01-14 ENCOUNTER — COMMUNICATION - HEALTHEAST (OUTPATIENT)
Dept: INTERNAL MEDICINE | Facility: CLINIC | Age: 66
End: 2021-01-14

## 2021-01-14 ENCOUNTER — OFFICE VISIT - HEALTHEAST (OUTPATIENT)
Dept: ONCOLOGY | Facility: CLINIC | Age: 66
End: 2021-01-14

## 2021-01-14 ENCOUNTER — COMMUNICATION - HEALTHEAST (OUTPATIENT)
Dept: SCHEDULING | Facility: CLINIC | Age: 66
End: 2021-01-14

## 2021-01-14 DIAGNOSIS — F41.9 ANXIETY: ICD-10-CM

## 2021-01-14 DIAGNOSIS — C50.912 INVASIVE DUCTAL CARCINOMA OF BREAST, FEMALE, LEFT (H): ICD-10-CM

## 2021-01-14 DIAGNOSIS — I10 ESSENTIAL HYPERTENSION: ICD-10-CM

## 2021-01-15 ENCOUNTER — AMBULATORY - HEALTHEAST (OUTPATIENT)
Dept: INTERNAL MEDICINE | Facility: CLINIC | Age: 66
End: 2021-01-15

## 2021-01-16 ENCOUNTER — AMBULATORY - HEALTHEAST (OUTPATIENT)
Dept: INTERNAL MEDICINE | Facility: CLINIC | Age: 66
End: 2021-01-16

## 2021-01-16 ENCOUNTER — COMMUNICATION - HEALTHEAST (OUTPATIENT)
Dept: ONCOLOGY | Facility: CLINIC | Age: 66
End: 2021-01-16

## 2021-01-16 DIAGNOSIS — C50.412 MALIGNANT NEOPLASM OF UPPER-OUTER QUADRANT OF LEFT BREAST IN FEMALE, ESTROGEN RECEPTOR POSITIVE (H): ICD-10-CM

## 2021-01-16 DIAGNOSIS — I10 ESSENTIAL HYPERTENSION: ICD-10-CM

## 2021-01-16 DIAGNOSIS — Z79.811 AROMATASE INHIBITOR USE: ICD-10-CM

## 2021-01-16 DIAGNOSIS — Z17.0 MALIGNANT NEOPLASM OF UPPER-OUTER QUADRANT OF LEFT BREAST IN FEMALE, ESTROGEN RECEPTOR POSITIVE (H): ICD-10-CM

## 2021-01-21 ENCOUNTER — OFFICE VISIT - HEALTHEAST (OUTPATIENT)
Dept: INTERNAL MEDICINE | Facility: CLINIC | Age: 66
End: 2021-01-21

## 2021-01-21 ENCOUNTER — TRANSFERRED RECORDS (OUTPATIENT)
Dept: HEALTH INFORMATION MANAGEMENT | Facility: CLINIC | Age: 66
End: 2021-01-21

## 2021-01-21 ENCOUNTER — COMMUNICATION - HEALTHEAST (OUTPATIENT)
Dept: INTERNAL MEDICINE | Facility: CLINIC | Age: 66
End: 2021-01-21

## 2021-01-21 DIAGNOSIS — F41.9 ANXIETY: ICD-10-CM

## 2021-01-21 DIAGNOSIS — I10 ESSENTIAL HYPERTENSION: ICD-10-CM

## 2021-01-26 ENCOUNTER — OFFICE VISIT - HEALTHEAST (OUTPATIENT)
Dept: ONCOLOGY | Facility: HOSPITAL | Age: 66
End: 2021-01-26

## 2021-01-26 DIAGNOSIS — F33.8 SEASONAL AFFECTIVE DISORDER (H): ICD-10-CM

## 2021-01-26 DIAGNOSIS — F43.22 ADJUSTMENT DISORDER WITH ANXIOUS MOOD: ICD-10-CM

## 2021-01-26 DIAGNOSIS — C50.912 INVASIVE DUCTAL CARCINOMA OF BREAST, FEMALE, LEFT (H): ICD-10-CM

## 2021-01-29 ENCOUNTER — COMMUNICATION - HEALTHEAST (OUTPATIENT)
Dept: INTERNAL MEDICINE | Facility: CLINIC | Age: 66
End: 2021-01-29

## 2021-02-01 ENCOUNTER — COMMUNICATION - HEALTHEAST (OUTPATIENT)
Dept: INTERNAL MEDICINE | Facility: CLINIC | Age: 66
End: 2021-02-01

## 2021-02-01 DIAGNOSIS — I10 ESSENTIAL HYPERTENSION: ICD-10-CM

## 2021-02-24 ENCOUNTER — OFFICE VISIT (OUTPATIENT)
Dept: PEDIATRICS | Facility: CLINIC | Age: 66
End: 2021-02-24
Payer: COMMERCIAL

## 2021-02-24 VITALS
OXYGEN SATURATION: 99 % | WEIGHT: 157.6 LBS | TEMPERATURE: 97.9 F | BODY MASS INDEX: 27.93 KG/M2 | SYSTOLIC BLOOD PRESSURE: 132 MMHG | HEIGHT: 63 IN | HEART RATE: 72 BPM | DIASTOLIC BLOOD PRESSURE: 86 MMHG

## 2021-02-24 DIAGNOSIS — Z76.89 ENCOUNTER TO ESTABLISH CARE: Primary | ICD-10-CM

## 2021-02-24 DIAGNOSIS — C50.412 MALIGNANT NEOPLASM OF UPPER-OUTER QUADRANT OF LEFT BREAST IN FEMALE, ESTROGEN RECEPTOR POSITIVE (H): ICD-10-CM

## 2021-02-24 DIAGNOSIS — Z83.3 FAMILY HISTORY OF DIABETES MELLITUS: ICD-10-CM

## 2021-02-24 DIAGNOSIS — I10 BENIGN ESSENTIAL HYPERTENSION: ICD-10-CM

## 2021-02-24 DIAGNOSIS — Z17.0 MALIGNANT NEOPLASM OF UPPER-OUTER QUADRANT OF LEFT BREAST IN FEMALE, ESTROGEN RECEPTOR POSITIVE (H): ICD-10-CM

## 2021-02-24 LAB
CREAT UR-MCNC: 31 MG/DL
HBA1C MFR BLD: 5.8 % (ref 0–5.6)
MICROALBUMIN UR-MCNC: <5 MG/L
MICROALBUMIN/CREAT UR: NORMAL MG/G CR (ref 0–25)

## 2021-02-24 PROCEDURE — 83036 HEMOGLOBIN GLYCOSYLATED A1C: CPT | Performed by: NURSE PRACTITIONER

## 2021-02-24 PROCEDURE — 99204 OFFICE O/P NEW MOD 45 MIN: CPT | Performed by: NURSE PRACTITIONER

## 2021-02-24 PROCEDURE — 36415 COLL VENOUS BLD VENIPUNCTURE: CPT | Performed by: NURSE PRACTITIONER

## 2021-02-24 PROCEDURE — 80048 BASIC METABOLIC PNL TOTAL CA: CPT | Performed by: NURSE PRACTITIONER

## 2021-02-24 PROCEDURE — 82043 UR ALBUMIN QUANTITATIVE: CPT | Performed by: NURSE PRACTITIONER

## 2021-02-24 RX ORDER — SPIRONOLACTONE 25 MG/1
TABLET ORAL
COMMUNITY
End: 2021-02-24

## 2021-02-24 RX ORDER — ASPIRIN 81 MG/1
81 TABLET, CHEWABLE ORAL
COMMUNITY
End: 2022-06-27

## 2021-02-24 RX ORDER — AMLODIPINE AND BENAZEPRIL HYDROCHLORIDE 10; 20 MG/1; MG/1
CAPSULE ORAL
COMMUNITY
End: 2021-02-24

## 2021-02-24 RX ORDER — POTASSIUM CHLORIDE 1500 MG/1
TABLET, EXTENDED RELEASE ORAL
COMMUNITY
Start: 2020-11-03 | End: 2022-11-02

## 2021-02-24 RX ORDER — LABETALOL 200 MG/1
200 TABLET, FILM COATED ORAL
COMMUNITY
Start: 2020-12-23 | End: 2021-02-24 | Stop reason: SINTOL

## 2021-02-24 RX ORDER — LOSARTAN POTASSIUM 50 MG/1
100 TABLET ORAL DAILY
COMMUNITY
Start: 2021-02-01 | End: 2021-03-22

## 2021-02-24 RX ORDER — ANASTROZOLE 1 MG/1
1 TABLET ORAL
COMMUNITY
Start: 2020-04-23 | End: 2021-02-24 | Stop reason: SINTOL

## 2021-02-24 RX ORDER — LISINOPRIL 20 MG/1
20 TABLET ORAL
COMMUNITY
End: 2021-02-24

## 2021-02-24 RX ORDER — TRIAMTERENE AND HYDROCHLOROTHIAZIDE 37.5; 25 MG/1; MG/1
1 CAPSULE ORAL DAILY
COMMUNITY
Start: 2021-01-12 | End: 2021-05-24 | Stop reason: ALTCHOICE

## 2021-02-24 RX ORDER — AMLODIPINE BESYLATE 10 MG/1
10 TABLET ORAL
COMMUNITY
Start: 2020-08-14 | End: 2021-07-15

## 2021-02-24 SDOH — HEALTH STABILITY: MENTAL HEALTH: HOW OFTEN DO YOU HAVE A DRINK CONTAINING ALCOHOL?: NOT ASKED

## 2021-02-24 SDOH — HEALTH STABILITY: MENTAL HEALTH: HOW OFTEN DO YOU HAVE 6 OR MORE DRINKS ON ONE OCCASION?: NOT ASKED

## 2021-02-24 SDOH — HEALTH STABILITY: MENTAL HEALTH: HOW MANY STANDARD DRINKS CONTAINING ALCOHOL DO YOU HAVE ON A TYPICAL DAY?: NOT ASKED

## 2021-02-24 ASSESSMENT — MIFFLIN-ST. JEOR: SCORE: 1229

## 2021-02-24 NOTE — PROGRESS NOTES
Assessment & Plan     Encounter to establish care  Histories and medications reviewed and updated.     Benign essential hypertension  Uncontrolled, goal BP per previous provider was 120/80. Currently prescribed amlodipine 10 mg daily, triamterene-hydrochlorothiazide 37.5-25 mg (2 tabs) daily, and losartan 100 mg daily. Recently weaned off labetalol 2/19 and has been titrating up on losartan, started 100 mg daily 4 days ago. Checks her BP at home, nearly every day. Readings are typically higher on her home BP monitor compared to when she is in clinic, she is wondering about if it is calibrated appropriately.  Plan:   - Basic metabolic panel  (Ca, Cl, CO2, Creat, Gluc, K, Na, BUN)   - Albumin Random Urine Quantitative with Creat Ratio   - MED THERAPY MANAGE REFERRAL    Malignant neoplasm of upper-outer quadrant of left breast in female, estrogen receptor positive (H)  Diagnosed with breast cancer (right) in 2008 and again (left) in 2019. S/p double mastectomy in 2019. Following with oncology, needs referral to establish care with new oncologist within [place]. Had been prescribed anastrozole but there was thought that it was contributing to her high BP, so this was stopped until better BP control was obtained.   Plan:   - Oncology/Hematology Adult Referral; Future    Family history of diabetes mellitus  History of diabetes in mother, father, sister.   Plan:   - Hemoglobin A1c      55 minutes spent on the date of the encounter doing chart review, review of outside records, review of test results, patient visit and documentation            MEDICATIONS:  Continue current medications without change  CONSULTATION/REFERRAL to MTM    Return in about 2 weeks (around 3/10/2021) for MTM.    JONATHAN Villeda CNP  [facility] DWAYNE Victor is a 65 year old who presents for the following health issues:    HPI     Here to establish care with new provider.     #Hypertension  Uncontrolled.  Currently prescribed amlodipine 10 mg daily, triamterene-hydrochlorothiazide 37.5-25 mg (2 tabs) daily, and losartan 100 mg daily. Recently weaned off labetalol 2/19 and has been titrating up on losartan, started 100 mg daily 4 days ago. Checks her BP at home, nearly every day. Readings are typically higher on her home BP monitor compared to when she is in clinic, she is wondering about if it is calibrated appropriately.    #Breast cancer   Diagnosed with breast cancer (right) in 2008 and again (left) in 2019. S/p double mastectomy in 2019. Following with oncology, needs referral to establish care with new oncologist within [place]. Had been prescribed anastrozole but there was thought that it was contributing to her high BP, so this was stopped until better BP control was obtained.     Past Medical History:   Diagnosis Date     Benign essential hypertension      Malignant neoplasm of upper-outer quadrant of left breast in female, estrogen receptor positive (H)      Past Surgical History:   Procedure Laterality Date     APPENDECTOMY  2011     MASTECTOMY Bilateral 2019     TOE SURGERY Right      Family History   Problem Relation Age of Onset     Cerebrovascular Disease Mother      Hypertension Mother      Diabetes Mother      Diabetes Father      Hypertension Father      Hypertension Sister      Hypertension Brother      Hypertension Sister      Hypertension Sister      Diabetes Sister      Social History     Socioeconomic History     Marital status:      Spouse name: Not on file     Number of children: Not on file     Years of education: Not on file     Highest education level: Not on file   Occupational History     Not on file   Social Needs     Financial resource strain: Not on file     Food insecurity     Worry: Not on file     Inability: Not on file     Transportation needs     Medical: Not on file     Non-medical: Not on file   Tobacco Use     Smoking status: Never Smoker     Smokeless tobacco: Never  "Used   Substance and Sexual Activity     Alcohol use: Not Currently     Drug use: Never     Sexual activity: Not on file   Lifestyle     Physical activity     Days per week: Not on file     Minutes per session: Not on file     Stress: Not on file   Relationships     Social connections     Talks on phone: Not on file     Gets together: Not on file     Attends Alevism service: Not on file     Active member of club or organization: Not on file     Attends meetings of clubs or organizations: Not on file     Relationship status: Not on file     Intimate partner violence     Fear of current or ex partner: Not on file     Emotionally abused: Not on file     Physically abused: Not on file     Forced sexual activity: Not on file   Other Topics Concern     Not on file   Social History Narrative    Retired, 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 Lives in [place]. [employer] study, reading.         Claudette Castillo, DNP, APRN, CNP       Current Outpatient Medications   Medication     amLODIPine (NORVASC) 10 MG tablet     losartan (COZAAR) 50 MG tablet     potassium chloride ER (KLOR-CON M) 20 MEQ CR tablet     triamterene-HCTZ (DYAZIDE) 37.5-25 MG capsule     aspirin (ASA) 81 MG chewable tablet     No current facility-administered medications for this visit.         Allergies   Allergen Reactions     Amoxicillin      Red Dye Headache and Unknown     Tartrazine Headache and Unknown     Yellow Dyes Rash         Review of Systems    ROS: 10 point ROS neg other than the symptoms noted above in the HPI.      Objective    /86 (BP Location: Right arm, Patient Position: Sitting, Cuff Size: Adult Regular)   Pulse 72   Temp 97.9  F (36.6  C) (Tympanic)   Ht 1.6 m (5' 3\")   Wt 71.5 kg (157 lb 9.6 oz)   SpO2 99%   BMI 27.92 kg/m    Body mass index is 27.92 kg/m .  Physical Exam  Constitutional:       General: She is not in acute distress.     Appearance: Normal appearance. She is not ill-appearing " or toxic-appearing.   Cardiovascular:      Rate and Rhythm: Normal rate.   Pulmonary:      Effort: Pulmonary effort is normal. No respiratory distress.   Neurological:      General: No focal deficit present.      Mental Status: She is alert and oriented to person, place, and time.   Psychiatric:         Mood and Affect: Mood normal.         Behavior: Behavior normal.

## 2021-02-25 LAB
ANION GAP SERPL CALCULATED.3IONS-SCNC: 5 MMOL/L (ref 3–14)
BUN SERPL-MCNC: 26 MG/DL (ref 7–30)
CALCIUM SERPL-MCNC: 10.3 MG/DL (ref 8.5–10.1)
CHLORIDE SERPL-SCNC: 102 MMOL/L (ref 94–109)
CO2 SERPL-SCNC: 31 MMOL/L (ref 20–32)
CREAT SERPL-MCNC: 1.28 MG/DL (ref 0.52–1.04)
GFR SERPL CREATININE-BSD FRML MDRD: 44 ML/MIN/{1.73_M2}
GLUCOSE SERPL-MCNC: 98 MG/DL (ref 70–99)
POTASSIUM SERPL-SCNC: 3.6 MMOL/L (ref 3.4–5.3)
SODIUM SERPL-SCNC: 138 MMOL/L (ref 133–144)

## 2021-02-27 ENCOUNTER — TELEPHONE (OUTPATIENT)
Dept: PEDIATRICS | Facility: CLINIC | Age: 66
End: 2021-02-27

## 2021-02-27 DIAGNOSIS — I10 BENIGN ESSENTIAL HYPERTENSION: Primary | ICD-10-CM

## 2021-02-27 NOTE — TELEPHONE ENCOUNTER
Called patient to discuss recent labs, specifically her BMP. Unfortunately, I have been unable to reach her over the weekend.     Current BP medications:  Amlodipine 10 mg  Losartan 100 mg  Triamterene-hydrochlorothiazide 75-50 mg    Had been working with previous provider over the past month to transition off labetalol and start ARB. Dyazide was also increased from 1 capsule to 2 capsules.  BMP rechecked on 2/24 and Scr found to have taken a 66% increase since the last time it was checked 6 months ago.       Please inform patient of the following:  Your recent metabolic panel revealed a slight decline in your kidney function, which can sometimes happen upon starting new BP medications. This is why we recheck labs routinely when starting or changing BP meds. I would like you to decrease the losartan from 100 mg to 50 mg to give your kidneys a little break. I would like to recheck your kidney function in 1 week. (Please schedule patient for lab only appt on Monday 3/8). Please continue to check your BP at least once daily and record so that we can relay that data to the pharmacist at your upcoming appointment with them on 3/8.      Make sure you come really well hydrated to your lab appt 3/8. Avoid use of NSAIDs (iburpofen).       Thanks,   Claudette Castillo, DNP, APRN, CNP

## 2021-03-01 ENCOUNTER — MYC MEDICAL ADVICE (OUTPATIENT)
Dept: PEDIATRICS | Facility: CLINIC | Age: 66
End: 2021-03-01

## 2021-03-01 DIAGNOSIS — I10 BENIGN ESSENTIAL HYPERTENSION: Primary | ICD-10-CM

## 2021-03-01 NOTE — TELEPHONE ENCOUNTER
Patient sent in Tizaro Message this morning. Routed to PCP. Will continue encounter in that thread.

## 2021-03-01 NOTE — TELEPHONE ENCOUNTER
Per recent labs and recommendations from provider:    Your recent metabolic panel revealed a slight decline in your kidney function, which can sometimes happen upon starting new BP medications. This is why we recheck labs routinely when starting or changing BP meds. I would like you to decrease the losartan from 100 mg to 50 mg to give your kidneys a little break. I would like to recheck your kidney function in 1 week. (Please schedule patient for lab only appt on Monday 3/8). Please continue to check your BP at least once daily and record so that we can relay that data to the pharmacist at your upcoming appointment with them on 3/8.       Make sure you come really well hydrated to your lab appt 3/8. Avoid use of NSAIDs (iburpofen).         Thanks,   Claudette Castillo, DNP, APRN, CNP      I will forward patient message to provider to review to ensure decreasing BP medications is still appropriate or if additional changes are needed as well.

## 2021-03-01 NOTE — TELEPHONE ENCOUNTER
Huddled with BARBIE Castillo NP re:     Is there any way we can get her in with MTM sooner than 3/8? We are in a tough spot with her creatinine. She will likely need a beta blocker started but I would prefer this to be done at visit with MTM for full review of labs and meds. My other idea is to get her in with cardiology ASAP. If they have any opening this week, I would choose that over MTM.     Thanks!     Claudette         Discussed with patient, patient willing to see cardiology. Referral placed, patient would like RN to schedule appt.     Patient scheduled with Dr. Pizarro in [facility].    Per Claudette Castillo NP - ok to cancel MTM visit for time being.

## 2021-03-04 ENCOUNTER — OFFICE VISIT (OUTPATIENT)
Dept: CARDIOLOGY | Facility: CLINIC | Age: 66
End: 2021-03-04
Attending: NURSE PRACTITIONER
Payer: COMMERCIAL

## 2021-03-04 VITALS
BODY MASS INDEX: 28 KG/M2 | DIASTOLIC BLOOD PRESSURE: 84 MMHG | SYSTOLIC BLOOD PRESSURE: 164 MMHG | HEIGHT: 63 IN | WEIGHT: 158 LBS | HEART RATE: 70 BPM

## 2021-03-04 DIAGNOSIS — N18.2 ANEMIA OF CHRONIC RENAL FAILURE, STAGE 2 (MILD): Primary | ICD-10-CM

## 2021-03-04 DIAGNOSIS — I10 BENIGN ESSENTIAL HYPERTENSION: ICD-10-CM

## 2021-03-04 DIAGNOSIS — D63.1 ANEMIA OF CHRONIC RENAL FAILURE, STAGE 2 (MILD): Primary | ICD-10-CM

## 2021-03-04 PROCEDURE — 93005 ELECTROCARDIOGRAM TRACING: CPT | Performed by: INTERNAL MEDICINE

## 2021-03-04 PROCEDURE — 99204 OFFICE O/P NEW MOD 45 MIN: CPT | Performed by: INTERNAL MEDICINE

## 2021-03-04 RX ORDER — MINOXIDIL 10 MG/1
10 TABLET ORAL DAILY
Qty: 30 TABLET | Refills: 3 | Status: SHIPPED | OUTPATIENT
Start: 2021-03-04 | End: 2021-04-27

## 2021-03-04 ASSESSMENT — MIFFLIN-ST. JEOR: SCORE: 1230.81

## 2021-03-04 NOTE — PROGRESS NOTES
Service Date: 03/04/2021      It is a pleasure for me to see Ms. Sousa today for evaluation of hypertension.      This very delightful 65-year-old -American lady has had hypertension for the past 30 years.  She is also being treated for invasive ductal carcinoma of the breast and she has had bilateral mastectomy.  She was put on anastrozole, which resulted in worsening of her blood pressure.      There is no known history of previous cardiac disease.  She does not smoke, drink or abuse drugs.  She is not diabetic, though there is a family history of diabetes and hypertension.  She also tells me that when her mother was put on hydralazine she had drug-induced lupus.      Her blood pressures have been challenging to control.  She has been on multiple medications previously.  Current regimen is losartan 100 mg once daily, triamterene 1 tablet daily and amlodipine 10 mg once daily.  She was previously on labetalol.  She tells me that she had a tickling sensation in her throat, nasal congestion and swollen eyes together with a headache when she was on this medication.  Atenolol had the same effects as well.  It certainly sounds like an allergic reaction to me.  She also has multiple allergies to dye used in pharmaceutical manufacturing and foods.  Unfortunately, this does limit the number of medications we can use to treat her high blood pressure.      Her cardiac examination is unremarkable.      She does check her blood pressures at home.  Yesterday she woke up with a blood pressure 170/96.  Around lunch time, it was 153/90.  At night it was 128/70.      She is compliant with her medications.  She is not significantly overweight.  She adheres to a low-salt diet and exercise as much as she can.  As such, there is not much more that we can improve on hygienic measures.  What we are left with is pharmacologic treatment of her hypertension.  Recent labs do show a creatinine of 1.28 with a GFR estimate of 51.   HbA1C is 5.8.  Otherwise, electrolytes are normal.      As mentioned above, I definitely think we should avoid beta blockers on her.  Hydralazine should not be used with her mother's history as well as cross reaction with dye.  Clonidine similarly has a cross reaction with dye.  Fortunately, minoxidil does not.  Possible side effects were discussed with the patient.  She will continue to monitor her blood pressures at home. I would like to hold off on aldosterone blockers for now in view of her mild renal dysfunction.  Additionally, this class of medications has a high incidence of hyperkalemia and  Worsening renal failure in my personal experience.    I will see her again in a month's time for further followup.      45 minutes were spent in patient care time with this delightful lady.         ROBIN MEDINA MD, Merged with [facility]             D: 2021   T: 2021   MT: nadine      Name:     OTF BURGOS   MRN:      [id number]        Account:      [id number]   :      1955           Service Date: 2021      Document: [id number]

## 2021-03-04 NOTE — LETTER
3/4/2021    Claudette Castillo, APRN CNP  [address] [place] [address]    RE: Kayleigh Sousa       Dear Colleague,    I had the pleasure of seeing Kayleigh Sousa in the [facility].    HPI and Plan:   See dictation    Orders Placed This Encounter   Procedures     EKG 12-lead complete w/read - Clinics (performed today)       Orders Placed This Encounter   Medications     minoxidil (LONITEN) 10 MG tablet     Sig: Take 1 tablet (10 mg) by mouth daily     Dispense:  30 tablet     Refill:  3       Encounter Diagnoses   Name Primary?     Benign essential hypertension      Anemia of chronic renal failure, stage 2 (mild) Yes       CURRENT MEDICATIONS:  Current Outpatient Medications   Medication Sig Dispense Refill     amLODIPine (NORVASC) 10 MG tablet Take 10 mg by mouth       aspirin (ASA) 81 MG chewable tablet Take 81 mg by mouth       losartan (COZAAR) 50 MG tablet Take 100 mg by mouth daily        minoxidil (LONITEN) 10 MG tablet Take 1 tablet (10 mg) by mouth daily 30 tablet 3     potassium chloride ER (KLOR-CON M) 20 MEQ CR tablet Take 1 tablet by mouth once daily       triamterene-HCTZ (DYAZIDE) 37.5-25 MG capsule Take 2 capsules by mouth         ALLERGIES     Allergies   Allergen Reactions     Amoxicillin      Red Dye Headache and Unknown     Tartrazine Headache and Unknown     Yellow Dyes Rash       PAST MEDICAL HISTORY:  Past Medical History:   Diagnosis Date     Benign essential hypertension      Malignant neoplasm of upper-outer quadrant of left breast in female, estrogen receptor positive (H)        PAST SURGICAL HISTORY:  Past Surgical History:   Procedure Laterality Date     APPENDECTOMY  2011     MASTECTOMY Bilateral 2019     TOE SURGERY Right        FAMILY HISTORY:  Family History   Problem Relation Age of Onset     Cerebrovascular Disease Mother      Hypertension Mother      Diabetes Mother      Diabetes Father      Hypertension Father       Hypertension Sister      Hypertension Brother      Hypertension Sister      Hypertension Sister      Diabetes Sister        SOCIAL HISTORY:  Social History     Socioeconomic History     Marital status:      Spouse name: None     Number of children: None     Years of education: None     Highest education level: None   Occupational History     None   Social Needs     Financial resource strain: None     Food insecurity     Worry: None     Inability: None     Transportation needs     Medical: None     Non-medical: None   Tobacco Use     Smoking status: Never Smoker     Smokeless tobacco: Never Used   Substance and Sexual Activity     Alcohol use: Not Currently     Drug use: Never     Sexual activity: None   Lifestyle     Physical activity     Days per week: None     Minutes per session: None     Stress: None   Relationships     Social connections     Talks on phone: None     Gets together: None     Attends Sabianist service: None     Active member of club or organization: None     Attends meetings of clubs or organizations: None     Relationship status: None     Intimate partner violence     Fear of current or ex partner: None     Emotionally abused: None     Physically abused: None     Forced sexual activity: None   Other Topics Concern     None   Social History Narrative    Retired, had worked as a  in a [email or web address] center.  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 Lives in [place]. [employer] study, reading.         Claudette Castillo DNP, APRN, CNP       Review of Systems:  Skin:  Negative     Eyes:  Negative    ENT:  Negative    Respiratory:  Negative    Cardiovascular:    palpitations;Positive for;edema  Gastroenterology: Positive for heartburn;nausea;constipation  Genitourinary:  Positive for urinary frequency  Musculoskeletal:  Negative    Neurologic:  Negative    Psychiatric:  Negative    Heme/Lymph/Imm:  Positive for allergies  Endocrine:  Negative      Physical Exam:  Vitals: BP (!) 164/84   Pulse 70   Ht 1.6 m  "(5' 3\")   Wt 71.7 kg (158 lb)   BMI 27.99 kg/m      Constitutional:  cooperative, alert and oriented, well developed, well nourished, in no acute distress        Skin:  warm and dry to the touch, no apparent skin lesions or masses noted surgical scars well-healed        Head:  normocephalic, no masses or lesions        Eyes:  pupils equal and round, conjunctivae and lids unremarkable, sclera white, no xanthalasma, EOMS intact, no nystagmus        Lymph:No Cervical lymphadenopathy present     ENT:  no pallor or cyanosis, dentition good        Neck:  carotid pulses are full and equal bilaterally, JVP normal, no carotid bruit        Respiratory:  normal breath sounds, clear to auscultation, normal A-P diameter, normal symmetry, normal respiratory excursion, no use of accessory muscles         Cardiac: regular rhythm, normal S1/S2, no S3 or S4, apical impulse not displaced, no murmurs, gallops or rubs                pulses full and equal, no bruits auscultated                                        GI:  abdomen soft, non-tender, BS normoactive, no mass, no HSM, no bruits        Extremities and Muscular Skeletal:  no deformities, clubbing, cyanosis, erythema observed              Neurological:  no gross motor deficits        Psych:  Alert and Oriented x 3        Recent Lab Results:  LIPID RESULTS:  No results found for: CHOL, HDL, LDL, TRIG, CHOLHDLRATIO    LIVER ENZYME RESULTS:  No results found for: AST, ALT    CBC RESULTS:  No results found for: WBC, RBC, HGB, HCT, MCV, MCH, MCHC, RDW, PLT    BMP RESULTS:  Lab Results   Component Value Date     02/24/2021    POTASSIUM 3.6 02/24/2021    CHLORIDE 102 02/24/2021    CO2 31 02/24/2021    ANIONGAP 5 02/24/2021    GLC 98 02/24/2021    BUN 26 02/24/2021    CR 1.28 (H) 02/24/2021    GFRESTIMATED 44 (L) 02/24/2021    GFRESTBLACK 51 (L) 02/24/2021    NORRIS 10.3 (H) 02/24/2021        A1C RESULTS:  Lab Results   Component Value Date    A1C 5.8 (H) 02/24/2021       INR " RESULTS:  No results found for: INR    Thank you for allowing me to participate in the care of your patient.      Sincerely,     DR ROBIN MEDINA MD     [facility]    cc:   JONATHAN Villeda CNP  [address] [place] [place] [address] [place] [address]

## 2021-03-04 NOTE — PROGRESS NOTES
HPI and Plan:   See dictation    Orders Placed This Encounter   Procedures     EKG 12-lead complete w/read - Clinics (performed today)       Orders Placed This Encounter   Medications     minoxidil (LONITEN) 10 MG tablet     Sig: Take 1 tablet (10 mg) by mouth daily     Dispense:  30 tablet     Refill:  3       Encounter Diagnoses   Name Primary?     Benign essential hypertension      Anemia of chronic renal failure, stage 2 (mild) Yes       CURRENT MEDICATIONS:  Current Outpatient Medications   Medication Sig Dispense Refill     amLODIPine (NORVASC) 10 MG tablet Take 10 mg by mouth       aspirin (ASA) 81 MG chewable tablet Take 81 mg by mouth       losartan (COZAAR) 50 MG tablet Take 100 mg by mouth daily        minoxidil (LONITEN) 10 MG tablet Take 1 tablet (10 mg) by mouth daily 30 tablet 3     potassium chloride ER (KLOR-CON M) 20 MEQ CR tablet Take 1 tablet by mouth once daily       triamterene-HCTZ (DYAZIDE) 37.5-25 MG capsule Take 2 capsules by mouth         ALLERGIES     Allergies   Allergen Reactions     Amoxicillin      Red Dye Headache and Unknown     Tartrazine Headache and Unknown     Yellow Dyes Rash       PAST MEDICAL HISTORY:  Past Medical History:   Diagnosis Date     Benign essential hypertension      Malignant neoplasm of upper-outer quadrant of left breast in female, estrogen receptor positive (H)        PAST SURGICAL HISTORY:  Past Surgical History:   Procedure Laterality Date     APPENDECTOMY  2011     MASTECTOMY Bilateral 2019     TOE SURGERY Right        FAMILY HISTORY:  Family History   Problem Relation Age of Onset     Cerebrovascular Disease Mother      Hypertension Mother      Diabetes Mother      Diabetes Father      Hypertension Father      Hypertension Sister      Hypertension Brother      Hypertension Sister      Hypertension Sister      Diabetes Sister        SOCIAL HISTORY:  Social History     Socioeconomic History     Marital status:      Spouse name: None     Number of  "children: None     Years of education: None     Highest education level: None   Occupational History     None   Social Needs     Financial resource strain: None     Food insecurity     Worry: None     Inability: None     Transportation needs     Medical: None     Non-medical: None   Tobacco Use     Smoking status: Never Smoker     Smokeless tobacco: Never Used   Substance and Sexual Activity     Alcohol use: Not Currently     Drug use: Never     Sexual activity: None   Lifestyle     Physical activity     Days per week: None     Minutes per session: None     Stress: None   Relationships     Social connections     Talks on phone: None     Gets together: None     Attends Rastafarian service: None     Active member of club or organization: None     Attends meetings of clubs or organizations: None     Relationship status: None     Intimate partner violence     Fear of current or ex partner: None     Emotionally abused: None     Physically abused: None     Forced sexual activity: None   Other Topics Concern     None   Social History Narrative    Retired, had worked as a  in a [employer] center.  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 Lives in [place]. [employer], reading.         Claudette Castillo, DNP, APRN, CNP       Review of Systems:  Skin:  Negative     Eyes:  Negative    ENT:  Negative    Respiratory:  Negative    Cardiovascular:    palpitations;Positive for;edema  Gastroenterology: Positive for heartburn;nausea;constipation  Genitourinary:  Positive for urinary frequency  Musculoskeletal:  Negative    Neurologic:  Negative    Psychiatric:  Negative    Heme/Lymph/Imm:  Positive for allergies  Endocrine:  Negative      Physical Exam:  Vitals: BP (!) 164/84   Pulse 70   Ht 1.6 m (5' 3\")   Wt 71.7 kg (158 lb)   BMI 27.99 kg/m      Constitutional:  cooperative, alert and oriented, well developed, well nourished, in no acute distress        Skin:  warm and dry to the touch, no apparent skin lesions or masses noted surgical " scars well-healed        Head:  normocephalic, no masses or lesions        Eyes:  pupils equal and round, conjunctivae and lids unremarkable, sclera white, no xanthalasma, EOMS intact, no nystagmus        Lymph:No Cervical lymphadenopathy present     ENT:  no pallor or cyanosis, dentition good        Neck:  carotid pulses are full and equal bilaterally, JVP normal, no carotid bruit        Respiratory:  normal breath sounds, clear to auscultation, normal A-P diameter, normal symmetry, normal respiratory excursion, no use of accessory muscles         Cardiac: regular rhythm, normal S1/S2, no S3 or S4, apical impulse not displaced, no murmurs, gallops or rubs                pulses full and equal, no bruits auscultated                                        GI:  abdomen soft, non-tender, BS normoactive, no mass, no HSM, no bruits        Extremities and Muscular Skeletal:  no deformities, clubbing, cyanosis, erythema observed              Neurological:  no gross motor deficits        Psych:  Alert and Oriented x 3        Recent Lab Results:  LIPID RESULTS:  No results found for: CHOL, HDL, LDL, TRIG, CHOLHDLRATIO    LIVER ENZYME RESULTS:  No results found for: AST, ALT    CBC RESULTS:  No results found for: WBC, RBC, HGB, HCT, MCV, MCH, MCHC, RDW, PLT    BMP RESULTS:  Lab Results   Component Value Date     02/24/2021    POTASSIUM 3.6 02/24/2021    CHLORIDE 102 02/24/2021    CO2 31 02/24/2021    ANIONGAP 5 02/24/2021    GLC 98 02/24/2021    BUN 26 02/24/2021    CR 1.28 (H) 02/24/2021    GFRESTIMATED 44 (L) 02/24/2021    GFRESTBLACK 51 (L) 02/24/2021    NORRIS 10.3 (H) 02/24/2021        A1C RESULTS:  Lab Results   Component Value Date    A1C 5.8 (H) 02/24/2021       INR RESULTS:  No results found for: INR        CC  Claudette Castillo, APRN CNP  [address] [place] [place] [address]

## 2021-03-06 ENCOUNTER — MYC MEDICAL ADVICE (OUTPATIENT)
Dept: PEDIATRICS | Facility: CLINIC | Age: 66
End: 2021-03-06

## 2021-03-08 ENCOUNTER — TELEPHONE (OUTPATIENT)
Dept: CARDIOLOGY | Facility: CLINIC | Age: 66
End: 2021-03-08

## 2021-03-08 NOTE — TELEPHONE ENCOUNTER
Kayleigh called clinic - would like a call back from RN to discuss a few additional questions.    Please call home first and if no answer call cell.    Routing to Brittney.    Lois Moreau CMA on 3/8/2021 at 2:56 PM

## 2021-03-08 NOTE — TELEPHONE ENCOUNTER
Called patient.     We reviewed recent Cardiology visit and patient's follow up questions.     Advised patient to contact [facility] to discuss further with Nurse. Gave number to call.   Patient agrees with the plan. Was reassured and felt better about her questions being answered.     Overall, she has concerns on the side effects of Minoxidil.   She'd like to see if possibly changing to Olmesartan would be an option versus starting Minoxidil.   Patient also would like further laymen discussion on the left ventricular hypertrophy, etc.     VINAY MTMICHAELLE appointment is not needed. My review of her first message was misinterpreted.

## 2021-03-08 NOTE — TELEPHONE ENCOUNTER
Many questions concerning her medications. Concerned about the potential side effects of the minoxidil. Questioning why not change the losartan which she was told has only a 8-9 hour coverage to a 24 hour coverage drug in the same category like Olmesartan. Not sure she wants to try the minoxidil, would like to talk with Dr. Pizarro further about these medications.     She will send a [employer] message with her verbiage with these question. tentatively will set her up for a vidio call with Dr. Pizarro on 3/22/21 in case she has more questions/concerns about her medications which are not answered through [employer].    Will message Dr. Pizarro.

## 2021-03-09 ENCOUNTER — MYC MEDICAL ADVICE (OUTPATIENT)
Dept: PEDIATRICS | Facility: CLINIC | Age: 66
End: 2021-03-09

## 2021-03-09 DIAGNOSIS — I10 BENIGN ESSENTIAL HYPERTENSION: Primary | ICD-10-CM

## 2021-03-09 NOTE — TELEPHONE ENCOUNTER
Routing refill request to provider for review/approval because:  Medication is reported/historical

## 2021-03-09 NOTE — TELEPHONE ENCOUNTER
Sent Via my chart sent Dr. Pizarro's response   Per phone conversation with nurse Samuels.  Dr. Pizarro's recommendation below  With her dye allergy and intolerance to beta blockers, there is not much more to try.  That's why I chose Minoxidil.  Spironolactone is an option.   This can be discussed  in more detail at your visit on the 3-22-21 or another suggestion is to see the Resistant Hypertensive clinic at the [facility], may have some other options to try.

## 2021-03-14 ENCOUNTER — HEALTH MAINTENANCE LETTER (OUTPATIENT)
Age: 66
End: 2021-03-14

## 2021-03-17 NOTE — PROGRESS NOTES
Medication Therapy Management (MTM) Encounter    ASSESSMENT:                            Medication Adherence/Access: No issues identified    Hypertension: Patient is not meeting blood pressure goal of < 130/85mmHg per cardiologist. Patient would benefit from talking with cardiologist about switching back to lisinopril and stopping losartan. Patient also wanting to stop minoxidil due to side effects. Did discuss with patient that just because lisinopril was enough to control her blood pressure in the past does not mean it will be enough now. Patient is aware and accepts that she may still have to add something else on, but would like to retry lisinopril first. Patient would also benefit from rechecking her kidneys, since last BMP showed the had declined and patient scheduled for repeat lab tomorrow during MTM visit today. If kidney functions are still declined, would recommend looking into the cause and making sure none of her current medications are causing kidney injury.  Patient would also benefit from reaching out to [facility] compounding pharmacy to see if they can help avoid some of her dye reactions that occur in tablets and capsules and instead switch to liquid formulations.  In the future if she is using a compounding pharmacy, allergies may not be a consideration, so she could consider adding on a beta blocker instead for additional blood pressure control. Also eplerenone typically has less occurrences of side effects compared to spironolactone, so this may be a good option for her to consider in the future.    Breast cancer: Patient would benefit from MTM visit in the future to discuss questions, side effects, and drug interactions with hormone/breast cancer meds.    Heart Protection: Patient would benefit from an evaluation of whether she should be on aspirin, did not discuss today. There is currently no lipid panel documented or in Care Everywhere to help determine possible benefit. In the future  patient may benefit from FLP.    PLAN:                            1. Recheck your kidneys tomorrow. - lab already ordered, helped patient schedule  2. Talk with Dr. Pizarro about switching back to lisinopril and not wanting to continue taking minoxidil.  3. Consider switching to liquid formulations of medications to help prevent reactions to dyes. The number for the [facility] is [phone]    Follow-up: 2 months    SUBJECTIVE/OBJECTIVE:                          Kayleigh Sousa is a 65 year old female called for an initial visit. She was referred to me from Gayathri Castillo.      Reason for visit: Medication questions related to blood pressure meds. Carmen currently has a dye/food coloring she is allergic to. Worried about hair growth with minoxidil.    Allergies/ADRs: Reviewed in chart - She has multiple allergies to dye used in pharmaceutical manufacturing and foods.  Blue, Yellow, Red dyes. If she ingests these dyes she gets swelling in the face, bagginess under the eyes. Hives under her eyes too.  Tobacco: She reports that she has never smoked. She has never used smokeless tobacco.  Alcohol: not currently using  Caffeine: 2-3 cups/day of tea (peppermint), rarely sodas/day  Activity: walking almost every day    Medication Adherence/Access: Patient takes medications directly from bottles.  Patient takes medications 2 time(s) per day.   Per patient, misses medication 1 times per week. Marks it down on her calendar or in her phone to make sure she takes them.  Medication barriers: none.   The patient fills medications at [facility]: NO, fills medications at [facility] in [place].    Hypertension: Current medications include amlodipine 10 mg daily, losartan 100 mg daily, triamterene-hydrochlorothiazide 37.5-25 mg 2 tablets daily, minoxidil 10 mg daily. Patient also takes potassium chloride 20 mEq daily.  Patient does self-monitor blood pressure. Home BP monitoring 128/77 on 3/8.  Per Dr. Pizarro her blood  "pressure goal is <130/85.  Dose have some lightheadedness and dizziness, but has only noticed this since starting the minoxidil.  Stopped lisinopril a while ago due to concerns that it can recur breast cancer - read an article on the breast cancer website that says it is possible. Patient states that when she was on lisinopril her blood pressure was well controled and she didn't need to add on another medication like minoxidil. Patient would prefer to go back to lisinopril since this was so effective in the past. Even with the possible risks of increasing her risk for breast cancer, she believes it is still better if she could be off minoxidil. Patient does not think that losartan works as well as lisinopril did.  Had hair growth with spironolactone and does not want to retry that again. Also does not want to stay on minoxidil due to hair growth concerns.  Patient does not take any NSAIDs and does not feel like she was dehydrated when her kidneys were last checked. Is not aware of anything that changed, besides her medications, that may have caused the decline in kidney function.  Patient reports that her pharmacy is very good about trying to find medications that do not have the dyes that she is allergic to. Despite their help she still has many allergies, so it is difficult. She is currently allergic to a dye in her triamterene-hydrochlorothiazide. She has been looking into compounding pharmacies and is wondering if [facility] has one.  Patient sees Dr. Pizarro. His most recent recommendation \"With her dye allergy and intolerance to beta blockers, there is not much more to try.  That's why I chose Minoxidil.  Spironolactone is an option.   This can be discussed  in more detail at your visit on the 3-22-21 or another suggestion is to see the Resistant Hypertensive clinic at the [facility], may have some other options to try.\"  Per Dr. Pizarro's note: History of reactions to beta-blockers, history of reaction to " hydralazine  Next visit with Dr. Pizarro is on 3/22/21    BP Readings from Last 3 Encounters:   03/04/21 (!) 164/84   02/24/21 132/86     Potassium   Date Value Ref Range Status   02/24/2021 3.6 3.4 - 5.3 mmol/L Final     GFR Estimate   Date Value Ref Range Status   02/24/2021 44 (L) >60 mL/min/[1.73_m2] Final     Comment:     Non  GFR Calc  Starting 12/18/2018, serum creatinine based estimated GFR (eGFR) will be   calculated using the Chronic Kidney Disease Epidemiology Collaboration   (CKD-EPI) equation.       GFR Estimate If Black   Date Value Ref Range Status   02/24/2021 51 (L) >60 mL/min/[1.73_m2] Final     Comment:      GFR Calc  Starting 12/18/2018, serum creatinine based estimated GFR (eGFR) will be   calculated using the Chronic Kidney Disease Epidemiology Collaboration   (CKD-EPI) equation.       Breast Cancer:  Current medications include: none.  Was anastrazole but stopped it until she got her blood pressure under control, then she will decide whether she wants to go back on hormone therapy. Patient reports she would like to meet with [facility] pharmacist again in a couple months when she is looking back to restarting this to have her questions answered.    Heart protection:  Current medications include: aspirin 81 mg daily. No concerns with bruising or bleeding.    The ASCVD Risk score (Geovany DC Jr., et al., 2013) failed to calculate for the following reasons:    Cannot find a previous HDL lab    Cannot find a previous total cholesterol lab        Today's Vitals: There were no vitals taken for this visit.  ----------------    I spent 42 minutes with this patient today. I offer these suggestions for consideration by Claudette Castillo. A copy of the visit note was provided to the patient's primary care provider.    The patient was sent via [employer] a summary of these recommendations.     Lizet Doll, PharmD  PGY1 Medication Therapy Management Resident  Voicemail:  [phone]    Kayleigh Sousa was seen independently by Dr. Doll. I have reviewed and agree with the resident note and plan of care.      Denisse Garcia, PharmD [facility]  Medication Therapy Management Provider  Pager #[phone]      Telemedicine Visit Details  Type of service:  Telephone visit  Start Time: 8:01 AM  End Time: 8:43 AM  Originating Location (patient location): [place]  Distant Location (provider location):  [facility]      Medication Therapy Recommendations  Benign essential hypertension    Current Medication: minoxidil (LONITEN) 10 MG tablet   Rationale: Undesirable effect - Adverse medication event - Safety   Recommendation: Change Medication - Recommend discontinuing medication due to side effects, and per patient request try stopping losartan and switching to lisinopril   Status: Contact Provider - Awaiting Response

## 2021-03-18 ENCOUNTER — VIRTUAL VISIT (OUTPATIENT)
Dept: PHARMACY | Facility: CLINIC | Age: 66
End: 2021-03-18
Attending: NURSE PRACTITIONER
Payer: COMMERCIAL

## 2021-03-18 DIAGNOSIS — Z85.3 HX: BREAST CANCER: ICD-10-CM

## 2021-03-18 DIAGNOSIS — I10 BENIGN ESSENTIAL HYPERTENSION: Primary | ICD-10-CM

## 2021-03-18 DIAGNOSIS — Z79.82 CURRENT USE OF ASPIRIN: ICD-10-CM

## 2021-03-18 PROCEDURE — 99607 MTMS BY PHARM ADDL 15 MIN: CPT | Mod: TEL | Performed by: PHARMACIST

## 2021-03-18 PROCEDURE — 99605 MTMS BY PHARM NP 15 MIN: CPT | Mod: TEL | Performed by: PHARMACIST

## 2021-03-18 RX ORDER — LOSARTAN POTASSIUM 100 MG/1
100 TABLET ORAL DAILY
Qty: 90 TABLET | Refills: 0 | Status: SHIPPED | OUTPATIENT
Start: 2021-03-18 | End: 2021-03-22

## 2021-03-18 NOTE — PATIENT INSTRUCTIONS
Recommendations from today's MTM visit:                                                    MTM (medication therapy management) is a service provided by a clinical pharmacist designed to help you get the most of out of your medicines.   Today we reviewed what your medicines are for, how to know if they are working, that your medicines are safe and how to make your medicine regimen as easy as possible.      1. Recheck your kidneys tomorrow.    2. Talk with Dr. Pizarro about switching back to lisinopril and not wanting to continue taking minoxidil.    3. Consider switching to liquid formulations of medications to help prevent reactions to dyes. The number for the [facility] is [phone]    It was great to speak with you today.  I value your experience and would be very thankful for your time with providing feedback on our clinic survey. You may receive a survey via email or text message in the next few days.     Next MTM visit: 2 months    To schedule another MTM appointment, please call the clinic directly or you may call the MTM scheduling line at [phone] or toll-free at [phone].     My Clinical Pharmacist's contact information:                                                      It was a pleasure talking with you today!  Please feel free to contact me with any questions or concerns you have.      Lizet Doll PharmD  PGY1 Medication Therapy Management Resident  Voicemail: [phone]

## 2021-03-18 NOTE — LETTER
"        Date: 3/18/2021    Kayleigh Sousa  [address] [place] [address]    Dear Ms. Sousa,    Thank you for talking with me on 3/18/21 about your health and medications. Medicare s MTM (Medication Therapy Management) program helps you understand your medications and use them safely.      This letter includes an action plan (Medication Action Plan) and medication list (Personal Medication List). The action plan has steps you should take to help you get the best results from your medications. The medication list will help you keep track of your medications and how to use them the right way.       Have your action plan and medication list with you when you talk with your doctors, pharmacists, and other healthcare providers in your care team.     Ask your doctors, pharmacists, and other healthcare providers to update the action plan and medication list at every visit.     Take your medication list with you if you go to the hospital or emergency room.     Give a copy of the action plan and medication list to your family or caregivers.     If you want to talk about this letter or any of the papers with it, please call   [phone].We look forward to working with you, your doctors, and other healthcare providers to help you stay healthy through the [facility] MTM program.    Sincerely,  Lizet Doll [facility]    Enclosed: Medication Action Plan and Personal Medication List    MEDICATION ACTION PLAN FOR Kayleigh Sousa,  1955     This action plan will help you get the best results from your medications if you:   1. Read \"What we talked about.\"   2. Take the steps listed in the \"What I need to do\" boxes.   3. Fill in \"What I did and when I did it.\"   4. Fill in \"My follow-up plan\" and \"Questions I want to ask.\"     Have this action plan with you when you talk with your doctors, pharmacists, and other healthcare providers in your care team. Share this with your family or caregivers too.  DATE PREPARED: " 3/18/2021  What we talked about:  Minoxidil is currently causing unwanted side effects, and lisinopril has historically worked very well for you.                                              What I need to do: Talk with Dr. Pizarro about switching back to lisinopril and not wanting to continue taking minoxidil.   What I did and when I did it:                                              My follow-up plan:                 Questions I want to ask:              If you have any questions about your action plan, call Lizet Doll [facility], Phone: [phone] , Monday-Friday 8-4:30pm.           PERSONAL MEDICATION LIST FOR BRITTANEY Doty 1955     This medication list was made for you after we talked. We also used information from your doctor's chart.      Use blank rows to add new medications. Then fill in the dates you started using them.    Cross out medications when you no longer use them. Then write the date and why you stopped using them.    Ask your doctors, pharmacists, and other healthcare providers to update this list at every visit. Keep this list up-to-date with:       Prescription medications    Over the counter drugs     Herbals    Vitamins    Minerals      If you go to the hospital or emergency room, take this list with you. Share this with your family or caregivers too.     DATE PREPARED: 3/18/2021  Allergies or side effects: Amoxicillin, Blue dyes, Red dye, Tartrazine, and Yellow dyes     Medication:  AMLODIPINE BESYLATE 10 MG PO TABS      How I use it:  Take 10 mg by mouth      Why I use it:  Hypertension    Prescriber:  Patient Reported      Date I started using it:       Date I stopped using it:         Why I stopped using it:            Medication:  ASPIRIN 81 MG PO CHEW      How I use it:  Take 81 mg by mouth      Why I use it:  Heart protection    Prescriber:  Patient Reported      Date I started using it:       Date I stopped using it:         Why I stopped using it:            Medication:   LOSARTAN POTASSIUM 50 MG PO TABS      How I use it:  Take 100 mg by mouth daily       Why I use it:  Hypertension    Prescriber:  Patient Reported      Date I started using it:       Date I stopped using it:         Why I stopped using it:            Medication:  MINOXIDIL 10 MG PO TABS      How I use it:  Take 1 tablet (10 mg) by mouth daily      Why I use it: Benign essential hypertension; Anemia of chronic renal failure, stage 2 (mild)    Prescriber:  Tacho Pizarro MD      Date I started using it:       Date I stopped using it:         Why I stopped using it:            Medication:  POTASSIUM CHLORIDE SAGE ER 20 MEQ PO TBCR      How I use it:  Take 1 tablet by mouth once daily      Why I use it:  Low potassium    Prescriber:  Patient Reported      Date I started using it:       Date I stopped using it:         Why I stopped using it:            Medication:  TRIAMTERENE-HCTZ 37.5-25 MG PO CAPS      How I use it:  Take 2 capsules by mouth      Why I use it:  Hypertension    Prescriber:  Patient Reported      Date I started using it:       Date I stopped using it:         Why I stopped using it:            Medication:         How I use it:         Why I use it:      Prescriber:         Date I started using it:       Date I stopped using it:         Why I stopped using it:                Other Information:     If you have any questions about your medication list, call Lizet Doll [facility], Phone: [phone] , Monday-Friday 8-4:30pm.    According to the Paperwork Reduction Act of 1995, no persons are required to respond to a collection of information unless it displays a valid OMB control number. The valid OMB number for this information collection is [id number]. The time required to complete this information collection is estimated to average 40 minutes per response, including the time to review instructions, searching existing data resources, gather the data needed, and complete and review the information  collection. If you have any comments concerning the accuracy of the time estimate(s) or suggestions for improving this form, please write to: CMS, Attn: SABINO Reports Clearance Officer, [address] [place] [address], [place], [place] [address].

## 2021-03-19 DIAGNOSIS — I10 BENIGN ESSENTIAL HYPERTENSION: ICD-10-CM

## 2021-03-19 PROCEDURE — 36415 COLL VENOUS BLD VENIPUNCTURE: CPT | Performed by: NURSE PRACTITIONER

## 2021-03-19 PROCEDURE — 80048 BASIC METABOLIC PNL TOTAL CA: CPT | Performed by: NURSE PRACTITIONER

## 2021-03-20 LAB
ANION GAP SERPL CALCULATED.3IONS-SCNC: 3 MMOL/L (ref 3–14)
BUN SERPL-MCNC: 31 MG/DL (ref 7–30)
CALCIUM SERPL-MCNC: 8.5 MG/DL (ref 8.5–10.1)
CHLORIDE SERPL-SCNC: 101 MMOL/L (ref 94–109)
CO2 SERPL-SCNC: 31 MMOL/L (ref 20–32)
CREAT SERPL-MCNC: 1.44 MG/DL (ref 0.52–1.04)
GFR SERPL CREATININE-BSD FRML MDRD: 38 ML/MIN/{1.73_M2}
GLUCOSE SERPL-MCNC: 86 MG/DL (ref 70–99)
POTASSIUM SERPL-SCNC: 3.5 MMOL/L (ref 3.4–5.3)
SODIUM SERPL-SCNC: 135 MMOL/L (ref 133–144)

## 2021-03-22 ENCOUNTER — VIRTUAL VISIT (OUTPATIENT)
Dept: CARDIOLOGY | Facility: CLINIC | Age: 66
End: 2021-03-22
Payer: COMMERCIAL

## 2021-03-22 DIAGNOSIS — I10 BENIGN ESSENTIAL HYPERTENSION: Primary | ICD-10-CM

## 2021-03-22 PROCEDURE — 99213 OFFICE O/P EST LOW 20 MIN: CPT | Mod: 95 | Performed by: INTERNAL MEDICINE

## 2021-03-22 RX ORDER — LISINOPRIL 10 MG/1
10 TABLET ORAL DAILY
Qty: 30 TABLET | Refills: 3 | Status: SHIPPED | OUTPATIENT
Start: 2021-03-22 | End: 2021-04-22

## 2021-03-22 NOTE — LETTER
3/22/2021    Claudette Castillo, APRN CNP  [address] [place] [address]    RE: Kayleigh Sousa       Dear Colleague,    I had the pleasure of seeing Kayleigh Sousa in the [facility].    Kayleigh is a 65 year old who is being evaluated via a billable video visit.      How would you like to obtain your AVS? Mail a copy  If the video visit is dropped, the invitation should be resent by: Text to cell phone: [phone]  Will anyone else be joining your video visit? No       Review Of Systems  Skin: face swelling,   Eyes: glasses  Ears/Nose/Throat: negative  Respiratory: Shortness of breath-  and Dyspnea on exertion-  Cardiovascular: negative, palpitations, lower extremity edema and heaviness in chest  Gastrointstinal: negative  Genitourinary: negative  Musculoskeletal: negative  Neurologic: headaches and after taking meds   Psychiatric: negative  Hematologic/Lymphatic/Immunologic: negative  Endocrine: negative    Patient reported vitals:  BP: 119/71  Heart rate:108  Weight: 158lb    Patricia Wang [facility]      Video Start Time: 15:30  Video-Visit Details    Type of service:  Video Visit    Video End Time:3:48 PM    Originating Location (pt. Location): Home    Distant Location (provider location):  [facility]     Platform used for Video Visit: [facility]     General Appearance:    no distress, normal body habitus, upright.    ENT/Mouth:    membranes moist, no nasal discharge or bleeding gums. Normal head shape, no evidence of injury or laceration.    EYES:    no scleral icterus, normal conjunctivae    Neck:    no evidence of thyromegaly. Supple    Chest/Lungs:    No audible wheezing equal chest wall expansion. Non labored breathing. No cough.    Cardiovascular:    No evidence of elevated jugular venous pressure. No evidence of pitting edema bilaterally    Abdomen:    no evidence of abdominal distention. No observed  jaundice.    Extremities:    no cyanosis or clubbing noted.    Skin:    no xanthelasma, normal skin collar. No evidence of facial lacerations.    Neurologic:    Normal arm motion bilateral, no tremors. No evidence of focal defect.    Psychiatric:    alert and oriented x3, calm    Mrs. Sousa returns for follow-up of hypertension today.  For full details please refer to my note from March 4.    She has started taking minoxidil and so far this medication is well-tolerated without any side effects.  Blood pressure today is 119/76 but she does tell me that there has been some readings around 150 systolic and above.    She has been to see pharmacist recently.  She is looking into some other medications which she could perhaps take if the time components could be removed.  She is allergic to some of these dyes which unfortunately preclude medications like clonidine.  Her mother developed lupus like syndrome whilst on hydralazine and I think this medication should be avoided as well.  Beta-blockers caused facial swelling and I think there is a true allergy.    Since starting losartan she has noticed some swelling beneath both eyelids and swelling of the glands around her neck.  Facial swelling was not obvious on a video visit today but I certainly could not definitively rule out an allergy to losartan.  Previously she was on lisinopril which controlled her blood pressure.  She wanted to switch out of this medication when she developed breast cancer as she was informed that lisinopril might worsen carcinoma.  However she is willing to give this medication a try and I will stop losartan and switch her back to lisinopril.    We also went over her recent labs which were requested by the pharmacist.  Creatinine slightly worsened to 1.44.  I highly doubt that the medications are causing renal injury in any case creatinine levels are nowhere near high enough for me to recommend stopping an ACE inhibitor.    As I am not  hypertension expert and there are others who are, particularly amongst my nephrologist colleagues I would like to recommend this lady follow-up with one of them.  She is agreeable but until her appointment with them has been made I will continue to follow-up with her.  I have provisionally arranged to see her again in a month's time via video visit to check on how she is doing with lisinopril.    Thank you for allowing me to participate in the care of your patient.      Sincerely,     DR ROBIN MEDINA MD     [facility]    cc:   No referring provider defined for this encounter.

## 2021-03-22 NOTE — PROGRESS NOTES
Kayleigh is a 65 year old who is being evaluated via a billable video visit.      How would you like to obtain your AVS? Mail a copy  If the video visit is dropped, the invitation should be resent by: Text to cell phone: [phone]  Will anyone else be joining your video visit? No       Review Of Systems  Skin: face swelling,   Eyes: glasses  Ears/Nose/Throat: negative  Respiratory: Shortness of breath-  and Dyspnea on exertion-  Cardiovascular: negative, palpitations, lower extremity edema and heaviness in chest  Gastrointstinal: negative  Genitourinary: negative  Musculoskeletal: negative  Neurologic: headaches and after taking meds   Psychiatric: negative  Hematologic/Lymphatic/Immunologic: negative  Endocrine: negative    Patient reported vitals:  BP: 119/71  Heart rate:108  Weight: 158lb    Patricia Wang CMA      Video Start Time: 15:30  Video-Visit Details    Type of service:  Video Visit    Video End Time:3:48 PM    Originating Location (pt. Location): Home    Distant Location (provider location):  [facility]     Platform used for Video Visit: [facility]     General Appearance:    no distress, normal body habitus, upright.    ENT/Mouth:    membranes moist, no nasal discharge or bleeding gums. Normal head shape, no evidence of injury or laceration.    EYES:    no scleral icterus, normal conjunctivae    Neck:    no evidence of thyromegaly. Supple    Chest/Lungs:    No audible wheezing equal chest wall expansion. Non labored breathing. No cough.    Cardiovascular:    No evidence of elevated jugular venous pressure. No evidence of pitting edema bilaterally    Abdomen:    no evidence of abdominal distention. No observed jaundice.    Extremities:    no cyanosis or clubbing noted.    Skin:    no xanthelasma, normal skin collar. No evidence of facial lacerations.    Neurologic:    Normal arm motion bilateral, no tremors. No evidence of focal defect.    Psychiatric:    alert and  oriented x3, calm    Mrs. Sousa returns for follow-up of hypertension today.  For full details please refer to my note from March 4.    She has started taking minoxidil and so far this medication is well-tolerated without any side effects.  Blood pressure today is 119/76 but she does tell me that there has been some readings around 150 systolic and above.    She has been to see pharmacist recently.  She is looking into some other medications which she could perhaps take if the time components could be removed.  She is allergic to some of these dyes which unfortunately preclude medications like clonidine.  Her mother developed lupus like syndrome whilst on hydralazine and I think this medication should be avoided as well.  Beta-blockers caused facial swelling and I think there is a true allergy.    Since starting losartan she has noticed some swelling beneath both eyelids and swelling of the glands around her neck.  Facial swelling was not obvious on a video visit today but I certainly could not definitively rule out an allergy to losartan.  Previously she was on lisinopril which controlled her blood pressure.  She wanted to switch out of this medication when she developed breast cancer as she was informed that lisinopril might worsen carcinoma.  However she is willing to give this medication a try and I will stop losartan and switch her back to lisinopril.    We also went over her recent labs which were requested by the pharmacist.  Creatinine slightly worsened to 1.44.  I highly doubt that the medications are causing renal injury in any case creatinine levels are nowhere near high enough for me to recommend stopping an ACE inhibitor.    As I am not hypertension expert and there are others who are, particularly amongst my nephrologist colleagues I would like to recommend this lady follow-up with one of them.  She is agreeable but until her appointment with them has been made I will continue to follow-up with her.   I have provisionally arranged to see her again in a month's time via video visit to check on how she is doing with lisinopril.

## 2021-03-31 ENCOUNTER — OFFICE VISIT (OUTPATIENT)
Dept: INTERNAL MEDICINE | Facility: CLINIC | Age: 66
End: 2021-03-31
Payer: COMMERCIAL

## 2021-03-31 VITALS
BODY MASS INDEX: 28 KG/M2 | WEIGHT: 158 LBS | HEIGHT: 63 IN | OXYGEN SATURATION: 99 % | RESPIRATION RATE: 14 BRPM | SYSTOLIC BLOOD PRESSURE: 144 MMHG | HEART RATE: 98 BPM | DIASTOLIC BLOOD PRESSURE: 78 MMHG

## 2021-03-31 DIAGNOSIS — E87.6 HYPOKALEMIA: ICD-10-CM

## 2021-03-31 DIAGNOSIS — E26.9 HYPERALDOSTERONISM (H): ICD-10-CM

## 2021-03-31 DIAGNOSIS — I10 UNCONTROLLED HYPERTENSION: ICD-10-CM

## 2021-03-31 DIAGNOSIS — N18.32 STAGE 3B CHRONIC KIDNEY DISEASE (H): ICD-10-CM

## 2021-03-31 DIAGNOSIS — Z76.89 ENCOUNTER TO ESTABLISH CARE: Primary | ICD-10-CM

## 2021-03-31 DIAGNOSIS — C50.912 INVASIVE DUCTAL CARCINOMA OF BREAST, FEMALE, LEFT (H): ICD-10-CM

## 2021-03-31 PROBLEM — H40.003 GLAUCOMA SUSPECT OF BOTH EYES: Status: ACTIVE | Noted: 2019-06-12

## 2021-03-31 PROCEDURE — 36415 COLL VENOUS BLD VENIPUNCTURE: CPT | Performed by: INTERNAL MEDICINE

## 2021-03-31 PROCEDURE — 83835 ASSAY OF METANEPHRINES: CPT | Mod: 90 | Performed by: INTERNAL MEDICINE

## 2021-03-31 PROCEDURE — 84244 ASSAY OF RENIN: CPT | Mod: 90 | Performed by: INTERNAL MEDICINE

## 2021-03-31 PROCEDURE — 80053 COMPREHEN METABOLIC PANEL: CPT | Performed by: INTERNAL MEDICINE

## 2021-03-31 PROCEDURE — 82088 ASSAY OF ALDOSTERONE: CPT | Performed by: INTERNAL MEDICINE

## 2021-03-31 PROCEDURE — 99000 SPECIMEN HANDLING OFFICE-LAB: CPT | Performed by: INTERNAL MEDICINE

## 2021-03-31 PROCEDURE — 84443 ASSAY THYROID STIM HORMONE: CPT | Performed by: INTERNAL MEDICINE

## 2021-03-31 PROCEDURE — 99204 OFFICE O/P NEW MOD 45 MIN: CPT | Performed by: INTERNAL MEDICINE

## 2021-03-31 PROCEDURE — 99N1160 PR STATISICAL ALDOSTERONE/RENIN RATIO: Performed by: INTERNAL MEDICINE

## 2021-03-31 ASSESSMENT — ENCOUNTER SYMPTOMS
HEADACHES: 0
PALPITATIONS: 0
SHORTNESS OF BREATH: 0
FATIGUE: 0
DIZZINESS: 0

## 2021-03-31 ASSESSMENT — MIFFLIN-ST. JEOR: SCORE: 1230.81

## 2021-03-31 NOTE — PATIENT INSTRUCTIONS
Patient Education     Established High Blood Pressure    High blood pressure (hypertension) is a long-term (chronic) disease. Often healthcare providers don t know what causes it. But it can be caused by certain health conditions and medicines.  If you have high blood pressure, you may not have any symptoms. If you do have symptoms, they may include:    Headache    Dizziness    Changes in your vision    Chest pain    Shortness of breath  But even without symptoms, high blood pressure that s not treated raises your risk for heart attack, heart failure, kidney disease, and stroke. High blood pressure is a serious health risk and shouldn t be ignored.  Blood pressure measurements are given as 2 numbers. Systolic blood pressure is the upper number. This is the pressure when the heart contracts. Diastolic blood pressure is the lower number. This is the pressure when the heart relaxes between beats. You will see your blood pressure readings written together. For example, a person with a systolic pressure of 118 and a diastolic pressure of 78 will have 118/78 written in the medical record.  Blood pressure is classified as normal, raised (elevated) or stage 1 or stage 2 high blood pressure:    Normal blood pressure. Systolic of less than 120 and diastolic of less than 80 (120/80).    Elevated blood pressure. Systolic of 120 to 129 and diastolic less than 80.    Stage 1 high blood pressure. Systolic is 130 to 139 or diastolic between 80 to 89.    Stage 2 high blood pressure. Systolic is 140 or higher or the diastolic is 90 or higher.  Home care  If you have high blood pressure, follow these home care guidelines to help lower your blood pressure. If you are taking medicines for high blood pressure, these methods may reduce or end your need for medicines in the future.    Start a weight-loss program if you are overweight.    Cut back on how much salt you get in your diet. Here s how to do this:  ? Don t eat foods that have a  lot of salt. These include olives, pickles, smoked meats, and salted potato chips.  ? Don t add salt to your food at the table.  ? Use only small amounts of salt when cooking.    Start an exercise program. Talk with your healthcare provider about the type of exercise program that would be best for you. It doesn't have to be hard. Even brisk walking for 20 minutes 3 times a week is a good form of exercise.    Don t take medicines that stimulate the heart. This includes many over-the-counter cold and sinus decongestant pills and sprays, as well as diet pills. Check the warnings about high blood pressure on the label. Before buying any over-the-counter medicines or supplements, always ask the pharmacist about the product's possible interaction with your high blood pressure and your high blood pressure medicines.    Stimulants such as amphetamine or cocaine could be deadly for someone with high blood pressure. Never take these.    Limit how much caffeine you get in your diet. Switch to caffeine-free products.    Stop smoking. If you are a long-time smoker, this can be hard. Talk with your healthcare provider about medicines and nicotine replacement options to help you. Also join a stop-smoking program . This makes it more likely that you will quit for good.    Learn how to handle stress. This is an important part of any program to lower blood pressure. Learn about relaxation methods such as meditation, yoga, or biofeedback.    If your provider prescribed medicines, take them exactly as directed. Missing doses may cause your blood pressure get out of control.    If you miss a dose, check with your healthcare provider or pharmacist about what to do.    Think about buying an automatic blood pressure machine to check your blood pressure at home. Ask your provider for a recommendation. You can get one of these at most pharmacies.  Using a home blood pressure monitor  The American Heart Association advises the following  guidelines for home blood pressure monitoring:    Don't smoke or drink coffee for 30 minutes before taking your blood pressure.    Go to the bathroom before the test.    Relax for 5 minutes before taking the measurement.    Sit with your back supported (don't sit on a couch or soft chair). Keep your feet on the floor uncrossed. Place your arm on a solid flat surface (such as a table) with the upper part of the arm at heart level. Place the middle of the cuff directly above the bend of the elbow. Check the monitor's instruction manual for an illustration.    Take multiple readings. When you measure, take 2 to 3 readings one minute apart and record all of the results.    Take your blood pressure at the same time every day, or as your healthcare provider advises.    Record the date, time, and blood pressure reading.    Take the record with you to your next healthcare appointment. If your blood pressure monitor has a built-in memory, just take the monitor with you to your next appointment.    Call your provider if you have several high readings. Don't be frightened by one high blood pressure reading. But if you get a few high readings, check in with your healthcare provider.  Follow-up care  You will need to see your healthcare provider regularly. This is to check your blood pressure and to make changes to your medicines. Make a follow-up appointment as directed. Bring the record of your home blood pressure readings to the appointment.  Call 911  Call 911 if you have any of these:    Blood pressure of 180/120 or higher    Chest pain or shortness of breath    Weakness of an arm or leg or one side of the face    Problems speaking or seeing     When to get medical advice  Call your healthcare provider right away if any of these occur:    Severe headache    Throbbing or rushing sound in the ears    Nosebleed    Sudden severe pain in your belly (abdomen)    Extreme drowsiness, confusion, or fainting    Dizziness or spinning  feeling (vertigo)  [employer] last reviewed this educational content on 7/1/2019 2000-2020 [employer]. All rights reserved. This information is not intended as a substitute for professional medical care. Always follow your healthcare professional's instructions.

## 2021-03-31 NOTE — PROGRESS NOTES
Assessment & Plan   Problem List Items Addressed This Visit        Other    Invasive ductal carcinoma of breast, female, left (H)      Other Visit Diagnoses     Encounter to establish care    -  Primary    Uncontrolled hypertension        Relevant Orders    Aldosterone (Completed)    Renin activity (Completed)    Aldosterone Renin Ratio (Completed)    US Renal Complete w Doppler Complete    Metanephrines Plasma Free (Completed)    Comprehensive metabolic panel (BMP + Alb, Alk Phos, ALT, AST, Total. Bili, TP) (Completed)    TSH with free T4 reflex (Completed)         I am concerned about her blood pressure with worsening kidney function.  Will do labs to recheck the kidney function and also add additional blood work and renal ultrasound to rule out secondary causes of hypertension.  For now patient stopped taking minoxidil as it is giving her puffiness around the eye and patient has multiple medication allergies and working with pharmacist to see if medications can be compounded.  Advised to increase lisinopril to 40 mg and reduce her Dyazide to 1 pill a day to see if that improves her kidney function.  Eventually she needs to see nephrologist for her kidney function and blood pressure.    Return in about 3 weeks (around 4/21/2021) for Routine preventive.    Chhaya Carr MD  [facility] 65 [facility] who presents for the following health issues : establish care, manage blood pressure.    HPI     New Patient/Transfer of Care  Hypertension Follow-up  She was on atenolol, losartan and didnt work. Minoxidil was added.  She has also tried other medications.  Significant puffiness around the eye and she stopped taking it yesterday.  Her blood pressure is mildly elevated.  Her kidney function is also slowly declining. Mild decline in creatinine and urea noted from last year lab.    Has diagnosis of breast cancer and had lumpectomy.  Could not tolerate hormonal  "treatment after surgery.      Do you check your blood pressure regularly outside of the clinic? Yes     Are you following a low salt diet? Yes    Are your blood pressures ever more than 140 on the top number (systolic) OR more   than 90 on the bottom number (diastolic), for example 140/90? Yes      How many servings of fruits and vegetables do you eat daily?  2-3    On average, how many sweetened beverages do you drink each day (Examples: soda, juice, sweet tea, etc.  Do NOT count diet or artificially sweetened beverages)?   0    How many days per week do you exercise enough to make your heart beat faster? 4    How many minutes a day do you exercise enough to make your heart beat faster? 20 - 29    How many days per week do you miss taking your medication? 0    Review of Systems   Constitutional: Negative for fatigue.   Respiratory: Negative for shortness of breath.    Cardiovascular: Negative for chest pain and palpitations.   Neurological: Negative for dizziness and headaches.          Objective    BP (!) 144/78   Pulse 98   Resp 14   Ht 1.6 m (5' 3\")   Wt 71.7 kg (158 lb)   LMP  (LMP Unknown)   SpO2 99%   Breastfeeding No   BMI 27.99 kg/m    Body mass index is 27.99 kg/m .  Physical Exam  Vitals signs reviewed.   Constitutional:       Appearance: Normal appearance.   Cardiovascular:      Rate and Rhythm: Normal rate.      Pulses: Normal pulses.      Heart sounds: No murmur.   Pulmonary:      Effort: Pulmonary effort is normal.      Breath sounds: No wheezing.   Neurological:      General: No focal deficit present.      Mental Status: She is alert.   Psychiatric:         Mood and Affect: Mood normal.          "

## 2021-04-01 LAB — TSH SERPL DL<=0.005 MIU/L-ACNC: 1.54 MU/L (ref 0.4–4)

## 2021-04-02 LAB
ALBUMIN SERPL-MCNC: 4.4 G/DL (ref 3.4–5)
ALDOST SERPL-MCNC: 33.7 NG/DL (ref 0–31)
ALP SERPL-CCNC: 102 U/L (ref 40–150)
ALT SERPL W P-5'-P-CCNC: 28 U/L (ref 0–50)
ANION GAP SERPL CALCULATED.3IONS-SCNC: 8 MMOL/L (ref 3–14)
AST SERPL W P-5'-P-CCNC: 19 U/L (ref 0–45)
BILIRUB SERPL-MCNC: 0.5 MG/DL (ref 0.2–1.3)
BUN SERPL-MCNC: 25 MG/DL (ref 7–30)
CALCIUM SERPL-MCNC: 9.6 MG/DL (ref 8.5–10.1)
CHLORIDE SERPL-SCNC: 105 MMOL/L (ref 94–109)
CO2 SERPL-SCNC: 27 MMOL/L (ref 20–32)
CREAT SERPL-MCNC: 1.57 MG/DL (ref 0.52–1.04)
GFR SERPL CREATININE-BSD FRML MDRD: 34 ML/MIN/{1.73_M2}
GLUCOSE SERPL-MCNC: 97 MG/DL (ref 70–99)
POTASSIUM SERPL-SCNC: 3.3 MMOL/L (ref 3.4–5.3)
PROT SERPL-MCNC: 8.1 G/DL (ref 6.8–8.8)
SODIUM SERPL-SCNC: 140 MMOL/L (ref 133–144)

## 2021-04-04 LAB
ANNOTATION COMMENT IMP: ABNORMAL
METANEPHS SERPL-SCNC: 0.14 NMOL/L (ref 0–0.49)
NORMETANEPHRINE SERPL-SCNC: 1.1 NMOL/L (ref 0–0.89)

## 2021-04-05 LAB — RENIN PLAS-CCNC: 1.1 NG/ML/HR

## 2021-04-06 PROBLEM — E87.6 HYPOKALEMIA: Status: ACTIVE | Noted: 2021-04-06

## 2021-04-06 PROBLEM — N18.32 STAGE 3B CHRONIC KIDNEY DISEASE (H): Status: ACTIVE | Noted: 2021-04-06

## 2021-04-06 PROBLEM — E26.9 HYPERALDOSTERONISM (H): Status: ACTIVE | Noted: 2021-04-06

## 2021-04-06 LAB — ALDOSTERONE RENIN RATIO: 30.6 (ref 0–25)

## 2021-04-07 ENCOUNTER — HOSPITAL ENCOUNTER (OUTPATIENT)
Dept: ULTRASOUND IMAGING | Facility: CLINIC | Age: 66
Discharge: HOME | End: 2021-04-07
Attending: INTERNAL MEDICINE | Admitting: INTERNAL MEDICINE
Payer: COMMERCIAL

## 2021-04-07 DIAGNOSIS — I10 UNCONTROLLED HYPERTENSION: ICD-10-CM

## 2021-04-07 PROCEDURE — 76770 US EXAM ABDO BACK WALL COMP: CPT

## 2021-04-07 PROCEDURE — 93975 VASCULAR STUDY: CPT

## 2021-04-20 ENCOUNTER — MYC MEDICAL ADVICE (OUTPATIENT)
Dept: ENDOCRINOLOGY | Facility: CLINIC | Age: 66
End: 2021-04-20

## 2021-04-20 ENCOUNTER — COMMUNICATION - HEALTHEAST (OUTPATIENT)
Dept: ONCOLOGY | Facility: CLINIC | Age: 66
End: 2021-04-20

## 2021-04-21 ENCOUNTER — VIRTUAL VISIT (OUTPATIENT)
Dept: ENDOCRINOLOGY | Facility: CLINIC | Age: 66
End: 2021-04-21
Payer: COMMERCIAL

## 2021-04-21 ENCOUNTER — MYC MEDICAL ADVICE (OUTPATIENT)
Dept: INTERNAL MEDICINE | Facility: CLINIC | Age: 66
End: 2021-04-21

## 2021-04-21 DIAGNOSIS — I10 ESSENTIAL HYPERTENSION: Primary | ICD-10-CM

## 2021-04-21 DIAGNOSIS — I10 BENIGN ESSENTIAL HYPERTENSION: ICD-10-CM

## 2021-04-21 PROCEDURE — 99204 OFFICE O/P NEW MOD 45 MIN: CPT | Mod: 95 | Performed by: INTERNAL MEDICINE

## 2021-04-21 RX ORDER — LISINOPRIL 10 MG/1
10 TABLET ORAL DAILY
Qty: 90 TABLET | Refills: 0 | Status: CANCELLED | OUTPATIENT
Start: 2021-04-21

## 2021-04-21 NOTE — TELEPHONE ENCOUNTER
What is current Lisinopril dose? Is blood pressure controlled. From the last OV Lisinopril was supposed to be increased to 40 mg daily.

## 2021-04-21 NOTE — TELEPHONE ENCOUNTER
"Called patient and left message to call the clinic back or send a myc message.  What is the current dose of lisinopril she is taking and how have her blood pressure readings been?      Per cardiology appointment on 3/22/21:     Lisinopril 10 mg Oral DAILY    Per office visit note on 3/31/21:  \"Advised to increase lisinopril to 40 mg and reduce her Dyazide to 1 pill a day to see if that improves her kidney function. \"      Myc message sent to patient.   "

## 2021-04-21 NOTE — LETTER
"    4/21/2021         RE: Kayleigh Sousa  [address]  [place] [address]        Dear Colleague,    Thank you for referring your patient, Kayleigh Sousa, to the [facility]. Please see a copy of my visit note below.    Kayleigh is a 65 year old who is being evaluated via a billable video visit.      How would you like to obtain your AVS? MyChart  If the video visit is dropped, the invitation should be resent by: Text to cell phone: [phone]  Will anyone else be joining your video visit? No      Video Start Time: 9:00 AM   CC: HTN     HPI:   Patient presents for evaluation of HTN.   Began treatment at age 35.   Shortly after she developed HTN, she needed to start on potassium supplementation.     No recent change in weight.   No skin or hair changes.   Notes she has been having issues with sensitive skin and hives recently.   She has undergone allergy testing. She is actually thinking about going to a [facility] pharmacy due to allergy to dyes.     She has been having \"slight\" headaches which do not disrupt her activity.   No anxiety or tremors.     ROS: 10 point ROS neg other than the symptoms noted above in the HPI.    PMH:   Patient Active Problem List   Diagnosis     Essential hypertension     Hx of adenomatous colonic polyps     Glaucoma suspect of both eyes     Invasive ductal carcinoma of breast, female, left (H)     History of breast cancer     Stage 3b chronic kidney disease     Hyperaldosteronism (H)     Hypokalemia      Meds:  Current Outpatient Medications   Medication     amLODIPine (NORVASC) 10 MG tablet     aspirin (ASA) 81 MG chewable tablet     lisinopril (ZESTRIL) 10 MG tablet     minoxidil (LONITEN) 10 MG tablet     potassium chloride ER (KLOR-CON M) 20 MEQ CR tablet     triamterene-HCTZ (DYAZIDE) 37.5-25 MG capsule     No current facility-administered medications for this visit.       FHX:   Both parents have HTN.   Brother has HTN.   Two sisters with HTN but they " developed it later in life.     SHX:  Non-smoker.   Retired from customer service.     Exam:   GENERAL: Healthy, alert and no distress  EYES: Eyes grossly normal to inspection.  No discharge or erythema, or obvious scleral/conjunctival abnormalities.  HENT: Normal cephalic/atraumatic.  External ears, nose and mouth without ulcers or lesions.  No nasal drainage visible.  RESP: No audible wheeze, cough, or visible cyanosis.  No visible retractions or increased work of breathing.    MS: No gross musculoskeletal defects noted.  Normal range of motion.  No visible edema.  SKIN: Visible skin clear. No significant rash, abnormal pigmentation or lesions.  NEURO: Cranial nerves grossly intact.  Mentation and speech appropriate for age.  PSYCH: Mentation appears normal, affect normal/bright, judgement and insight intact, normal speech and appearance well-groomed.     A/P:   HTN - Workup to date remarkable for elevated plasma metanephrine and elevated serum aldosterone. The renin level is not suppressed. Metanephrine elevation not high enough to be diagnostic for pheochromocytoma. Her TSH is normal. She is currently on triamterene.  - jug for urine collection.   -Pharmacy referral to taper of triamterene. Need to be off for 6 weeks before recheck danny/renin. Already has appt with Lizet Doll.       Raheem Mcneill M.D      Video-Visit Details    Type of service:  Video Visit    Video End Time:9:29 AM    Originating Location (pt. Location): Home    Distant Location (provider location):  [facility]     Platform used for Video Visit: Doximity        Again, thank you for allowing me to participate in the care of your patient.        Sincerely,        Raheem Mcneill MD

## 2021-04-21 NOTE — PROGRESS NOTES
"Kayleigh is a 65 year old who is being evaluated via a billable video visit.      How would you like to obtain your AVS? MyChart  If the video visit is dropped, the invitation should be resent by: Text to cell phone: [phone]  Will anyone else be joining your video visit? No      Video Start Time: 9:00 AM   CC: HTN     HPI:   Patient presents for evaluation of HTN.   Began treatment at age 35.   Shortly after she developed HTN, she needed to start on potassium supplementation.     No recent change in weight.   No skin or hair changes.   Notes she has been having issues with sensitive skin and hives recently.   She has undergone allergy testing. She is actually thinking about going to a [facility] pharmacy due to allergy to dyes.     She has been having \"slight\" headaches which do not disrupt her activity.   No anxiety or tremors.     ROS: 10 point ROS neg other than the symptoms noted above in the HPI.    PMH:   Patient Active Problem List   Diagnosis     Essential hypertension     Hx of adenomatous colonic polyps     Glaucoma suspect of both eyes     Invasive ductal carcinoma of breast, female, left (H)     History of breast cancer     Stage 3b chronic kidney disease     Hyperaldosteronism (H)     Hypokalemia      Meds:  Current Outpatient Medications   Medication     amLODIPine (NORVASC) 10 MG tablet     aspirin (ASA) 81 MG chewable tablet     lisinopril (ZESTRIL) 10 MG tablet     minoxidil (LONITEN) 10 MG tablet     potassium chloride ER (KLOR-CON M) 20 MEQ CR tablet     triamterene-HCTZ (DYAZIDE) 37.5-25 MG capsule     No current facility-administered medications for this visit.       FHX:   Both parents have HTN.   Brother has HTN.   Two sisters with HTN but they developed it later in life.     SHX:  Non-smoker.   Retired from customer service.     Exam:   GENERAL: Healthy, alert and no distress  EYES: Eyes grossly normal to inspection.  No discharge or erythema, or obvious scleral/conjunctival " abnormalities.  HENT: Normal cephalic/atraumatic.  External ears, nose and mouth without ulcers or lesions.  No nasal drainage visible.  RESP: No audible wheeze, cough, or visible cyanosis.  No visible retractions or increased work of breathing.    MS: No gross musculoskeletal defects noted.  Normal range of motion.  No visible edema.  SKIN: Visible skin clear. No significant rash, abnormal pigmentation or lesions.  NEURO: Cranial nerves grossly intact.  Mentation and speech appropriate for age.  PSYCH: Mentation appears normal, affect normal/bright, judgement and insight intact, normal speech and appearance well-groomed.     A/P:   HTN - Workup to date remarkable for elevated plasma metanephrine and elevated serum aldosterone. The renin level is not suppressed. Metanephrine elevation not high enough to be diagnostic for pheochromocytoma. Her TSH is normal. She is currently on triamterene.  - jug for urine collection.   -Pharmacy referral to taper of triamterene. Need to be off for 6 weeks before recheck danny/renin. Already has appt with Lizet Doll.       Raheem Mcneill M.D      Video-Visit Details    Type of service:  Video Visit    Video End Time:9:29 AM    Originating Location (pt. Location): Home    Distant Location (provider location):  [facility]     Platform used for Video Visit: LocalOn

## 2021-04-21 NOTE — Clinical Note
Please see my note from today. Any chance to see her earlier to start moving her off the triamterene sooner?  Needs to be off 6 weeks before re checking danny/renin.     Thank you,   Raheem Mcneill MD on 4/21/2021 at 9:26 AM

## 2021-04-21 NOTE — TELEPHONE ENCOUNTER
Please see my chart message and advise.  Thanks    Please see other myc message from today as well.

## 2021-04-21 NOTE — TELEPHONE ENCOUNTER
Please see my chart message and advise.  Thanks    Patient requesting a 90 day supply.  Ordered by cardiology.  Does patient need to call cardiology?  Please advise, thanks.    Routed to covering provider.

## 2021-04-22 ENCOUNTER — OFFICE VISIT (OUTPATIENT)
Dept: PEDIATRICS | Facility: CLINIC | Age: 66
End: 2021-04-22
Payer: COMMERCIAL

## 2021-04-22 VITALS
TEMPERATURE: 97.6 F | RESPIRATION RATE: 16 BRPM | HEART RATE: 77 BPM | OXYGEN SATURATION: 98 % | WEIGHT: 153.5 LBS | DIASTOLIC BLOOD PRESSURE: 84 MMHG | HEIGHT: 63 IN | SYSTOLIC BLOOD PRESSURE: 161 MMHG | BODY MASS INDEX: 27.2 KG/M2

## 2021-04-22 DIAGNOSIS — Z53.9 ERRONEOUS ENCOUNTER--DISREGARD: Primary | ICD-10-CM

## 2021-04-22 RX ORDER — LISINOPRIL 40 MG/1
40 TABLET ORAL DAILY
Qty: 90 TABLET | Refills: 3 | Status: SHIPPED | OUTPATIENT
Start: 2021-04-22 | End: 2021-10-19

## 2021-04-22 ASSESSMENT — MIFFLIN-ST. JEOR: SCORE: 1210.4

## 2021-04-22 NOTE — PROGRESS NOTES
Pt not a patient at our clinic, was scheduled for office visit but thought she was here for BP check.  She has f/u scheduled with PCP on 4/27.  Had just increased lisinopril to 40 mg yesterday  Asymptomatic  OK to change to nurse visit and f/u with PCP        This encounter was opened in error. Please disregard.

## 2021-04-22 NOTE — PROGRESS NOTES
{PROVIDER CHARTING PREFERENCE:301957}    Galen Victor is a 65 year old who presents for the following health issues     HPI     Hypertension Follow-up      Do you check your blood pressure regularly outside of the clinic? Yes -home machine running higher    Are you following a low salt diet? Yes    Are your blood pressures ever more than 140 on the top number (systolic) OR more   than 90 on the bottom number (diastolic), for example 140/90? Yes      How many servings of fruits and vegetables do you eat daily?  4 or more    On average, how many sweetened beverages do you drink each day (Examples: soda, juice, sweet tea, etc.  Do NOT count diet or artificially sweetened beverages)?   1    How many days per week do you exercise enough to make your heart beat faster? 5    How many minutes a day do you exercise enough to make your heart beat faster? 20 - 29  How many days per week do you miss taking your medication? 1    What makes it hard for you to take your medications?  remembering to take       Visit with [facility] 3/18/21  Hypertension: Patient is not meeting blood pressure goal of < 130/85mmHg per cardiologist. Patient would benefit from talking with cardiologist about switching back to lisinopril and stopping losartan. Patient also wanting to stop minoxidil due to side effects. Did discuss with patient that just because lisinopril was enough to control her blood pressure in the past does not mean it will be enough now. Patient is aware and accepts that she may still have to add something else on, but would like to retry lisinopril first. Patient would also benefit from rechecking her kidneys, since last BMP showed the had declined and patient scheduled for repeat lab tomorrow during [facility] visit today. If kidney functions are still declined, would recommend looking into the cause and making sure none of her current medications are causing kidney injury.  Patient would also benefit from reaching out to [facility]  "compounding pharmacy to see if they can help avoid some of her dye reactions that occur in tablets and capsules and instead switch to liquid formulations.  In the future if she is using a compounding pharmacy, allergies may not be a consideration, so she could consider adding on a beta blocker instead for additional blood pressure control. Also eplerenone typically has less occurrences of side effects compared to spironolactone    Virtual visit with cardiologit 3/22, recommended switchign back to lisinopril (ARB caused swelling)  Alireza Victor, what is the current dose of your lisinopril?  How have your blood pressure readings been on this dose?  Back in March, Dr. Eagle wanted you to increase your dose of lisinopril to 40 mg daily and to decrease your diazide to 1 pill.  Did you do this?       {additonal problems for provider to add (Optional):863445}    Review of Systems   {ROS COMP (Optional):716374}      Objective    BP (!) 161/84   Pulse 77   Temp 97.6  F (36.4  C) (Axillary)   Resp 16   Ht 1.6 m (5' 3\")   Wt 69.6 kg (153 lb 8 oz)   LMP  (LMP Unknown)   SpO2 98%   BMI 27.19 kg/m    Body mass index is 27.19 kg/m .  Physical Exam   {Exam List (Optional):760494}    {Diagnostic Test Results (Optional):097126}    {AMBULATORY ATTESTATION (Optional):833876}        "

## 2021-04-24 ENCOUNTER — HOSPITAL ENCOUNTER (OUTPATIENT)
Dept: LAB | Facility: CLINIC | Age: 66
Discharge: HOME | End: 2021-04-24
Attending: INTERNAL MEDICINE | Admitting: INTERNAL MEDICINE
Payer: COMMERCIAL

## 2021-04-24 DIAGNOSIS — I10 ESSENTIAL HYPERTENSION: ICD-10-CM

## 2021-04-24 LAB
COLLECT DURATION TIME UR: 24 H
CREAT 24H UR-MRATE: 1.06 G/(24.H) (ref 0.8–1.8)
CREAT UR-MCNC: 34 MG/DL
SPECIMEN VOL UR: 3100 ML

## 2021-04-24 PROCEDURE — 83835 ASSAY OF METANEPHRINES: CPT | Performed by: INTERNAL MEDICINE

## 2021-04-24 PROCEDURE — 81050 URINALYSIS VOLUME MEASURE: CPT | Performed by: INTERNAL MEDICINE

## 2021-04-24 PROCEDURE — 82530 CORTISOL FREE: CPT | Performed by: INTERNAL MEDICINE

## 2021-04-24 PROCEDURE — 82570 ASSAY OF URINE CREATININE: CPT | Performed by: INTERNAL MEDICINE

## 2021-04-24 NOTE — LETTER
April 29, 2021      Kayleigh Sousa  [address] [place] [address]        Dear ,    We are writing to inform you of your test results.    Urine normal.   BMP, aldosterone and renin after off triamterene for 6 weeks. Pharmacy was going to manage this change in medication.     Resulted Orders   Metanephrine random or 24 hr urine   Result Value Ref Range    Hours Collected 25.0       Total Urine Volume 3,200       Metanephr 24 H ur/cr 47 0 - 300 ug/g CRT    Metanephrine 24 Hr/Random Urine 53 36 - 229 ug/d    Metanephrine Urine per Volume 17 ug/L    Normetaneph 24H ur/cr 308 0 - 400 ug/g CRT    Normetanephrine 24 Hr/Random Urine 344 95 - 650 ug/d    Normetanephrine Urine per Volume 111 ug/L    Metanephrine 24 Hr/Random Urine Interpretation SEE NOTE       Comment:      (Note)  Specimen is not the required 24 hour collection. Results   extrapolated to a 24 hour collection interval. Interpret   results with caution.  TEST INFORMATION: Metanephrines Fractionated, Urine  Smaller increases in metanephrine and/or normetanephrine   concentrations (less than two times the upper reference   limit) usually are the result of physiological stimuli,   drugs, or improper specimen collection. Essential   hypertension is often associated with slight elevations   (metanephrine less than 400 ug/d and normetanephrine less   than 900 ug/d). Elevated concentrations may be due to   intense physical activity, life-threatening illness, and   drug interferences.   Significant elevation of one or both metanephrines (three   or more times the upper reference limit) is associated with   an increased probability of a neuroendocrine tumor.  Access complete set of age- and/or gender-specific   reference intervals for this test in the [facility]   Test Directory ([employer]).   This test was developed and its performance characteristics   determined by [facility]. It has not been cleared or   approved by the US Food and Drug  Administration. This test   was performed in a CLIA certified laboratory and is   intended for clinical purposes.      Creatinine Urine/Volume 36 mg/dL    Creatinine Urine/24hr 1,116 500 - 1,400 mg/d      Comment:      (Note)  Performed by [facility],  [address] [place] [place] [address] [phone]  [email or web address], Tenisha Harris MD, Lab. Director     Creatinine timed urine   Result Value Ref Range    Creatinine Urine 34 mg/dL    Creatinine Urine Timed 1.06 0.80 - 1.80      Comment:      Units: g/24h    Volume in mL 3,100 mL    Duration in hours 24.0 h   Cortisol free urine   Result Value Ref Range    Cortisol Free Duration Urine 24 h    Volume 3,100 mL    Cortisol ug/g creatinine 7.36 ug/g CRT      Comment:      (Note)  Reference Interval: Cortisol ug/g crt  Female  Prepubertal: Less than 25 ug/g crt  18 years and older: Less than 24 ug/g crt  Pregnancy: Less than 59 ug/g crt  Male  Prepubertal: Less than 25 ug/g crt  18 years and older: Less than 32 ug/g crt      Cortisol Free Urine Random 2.65 ug/L    Cortisol Free Urine 8.2 <=45.0 ug/d    Creatinine Urine/Volume 36 mg/dL    Creatinine Urine/24hr 1,116 500 - 1,400 mg/d    Cortisol Free Urine Intrepretation SEE NOTE       Comment:      (Note)  INTERPRETIVE INFORMATION: Cortisol Urine Free by LC-MS/MS  Access complete set of age- and/or gender-specific   reference intervals for this test in the [facility]   Test Directory ([employer]).  This test was developed and its performance characteristics   determined by [facility]. It has not been cleared or   approved by the US Food and Drug Administration. This test   was performed in a CLIA certified laboratory and is   intended for clinical purposes.  Performed by [facility],  [address] [place] [address] [phone]  [email or web address], Tenisha Harris MD, Lab. Director         If you have any questions or concerns, please call the clinic at the number listed above.       Sincerely,      Raheem  Giuseppe Mcneill MD

## 2021-04-27 ENCOUNTER — OFFICE VISIT (OUTPATIENT)
Dept: INTERNAL MEDICINE | Facility: CLINIC | Age: 66
End: 2021-04-27
Payer: COMMERCIAL

## 2021-04-27 VITALS
RESPIRATION RATE: 14 BRPM | TEMPERATURE: 97.5 F | SYSTOLIC BLOOD PRESSURE: 126 MMHG | DIASTOLIC BLOOD PRESSURE: 78 MMHG | WEIGHT: 152 LBS | HEIGHT: 63 IN | OXYGEN SATURATION: 99 % | HEART RATE: 101 BPM | BODY MASS INDEX: 26.93 KG/M2

## 2021-04-27 DIAGNOSIS — I10 ESSENTIAL HYPERTENSION: ICD-10-CM

## 2021-04-27 DIAGNOSIS — Z13.220 ENCOUNTER FOR LIPID SCREENING FOR CARDIOVASCULAR DISEASE: ICD-10-CM

## 2021-04-27 DIAGNOSIS — N18.32 STAGE 3B CHRONIC KIDNEY DISEASE (H): ICD-10-CM

## 2021-04-27 DIAGNOSIS — Z00.00 ENCOUNTER FOR MEDICARE ANNUAL WELLNESS EXAM: Primary | ICD-10-CM

## 2021-04-27 DIAGNOSIS — Z13.6 ENCOUNTER FOR LIPID SCREENING FOR CARDIOVASCULAR DISEASE: ICD-10-CM

## 2021-04-27 LAB
ANION GAP SERPL CALCULATED.3IONS-SCNC: <1 MMOL/L (ref 3–14)
BASOPHILS # BLD AUTO: 0 10E9/L (ref 0–0.2)
BASOPHILS NFR BLD AUTO: 1.4 %
BUN SERPL-MCNC: 20 MG/DL (ref 7–30)
CALCIUM SERPL-MCNC: 9.7 MG/DL (ref 8.5–10.1)
CHLORIDE SERPL-SCNC: 104 MMOL/L (ref 94–109)
CHOLEST SERPL-MCNC: 181 MG/DL
CO2 SERPL-SCNC: 33 MMOL/L (ref 20–32)
COLLECT DURATION TIME SPEC: 24 H
CORTIS F 24H UR HPLC-MCNC: 2.65 UG/L
CORTIS F 24H UR-MRATE: 8.2 UG/D
CORTIS F/CREAT 24H UR: 7.36 UG/G CRT
CREAT 24H UR-MRATE: 1116 MG/D (ref 500–1400)
CREAT SERPL-MCNC: 1.49 MG/DL (ref 0.52–1.04)
CREAT UR-MCNC: 36 MG/DL
DIFFERENTIAL METHOD BLD: ABNORMAL
EOSINOPHIL # BLD AUTO: 0.2 10E9/L (ref 0–0.7)
EOSINOPHIL NFR BLD AUTO: 5.1 %
ERYTHROCYTE [DISTWIDTH] IN BLOOD BY AUTOMATED COUNT: 13.5 % (ref 10–15)
GFR SERPL CREATININE-BSD FRML MDRD: 36 ML/MIN/{1.73_M2}
GLUCOSE SERPL-MCNC: 102 MG/DL (ref 70–99)
HCT VFR BLD AUTO: 42.4 % (ref 35–47)
HDLC SERPL-MCNC: 83 MG/DL
HGB BLD-MCNC: 14 G/DL (ref 11.7–15.7)
IMP & REVIEW OF LAB RESULTS: NORMAL
LDLC SERPL CALC-MCNC: 76 MG/DL
LYMPHOCYTES # BLD AUTO: 0.7 10E9/L (ref 0.8–5.3)
LYMPHOCYTES NFR BLD AUTO: 23.3 %
MCH RBC QN AUTO: 29.5 PG (ref 26.5–33)
MCHC RBC AUTO-ENTMCNC: 33 G/DL (ref 31.5–36.5)
MCV RBC AUTO: 90 FL (ref 78–100)
MONOCYTES # BLD AUTO: 0.2 10E9/L (ref 0–1.3)
MONOCYTES NFR BLD AUTO: 7.4 %
NEUTROPHILS # BLD AUTO: 1.9 10E9/L (ref 1.6–8.3)
NEUTROPHILS NFR BLD AUTO: 62.8 %
NONHDLC SERPL-MCNC: 98 MG/DL
PLATELET # BLD AUTO: 249 10E9/L (ref 150–450)
POTASSIUM SERPL-SCNC: 3.5 MMOL/L (ref 3.4–5.3)
RBC # BLD AUTO: 4.74 10E12/L (ref 3.8–5.2)
SODIUM SERPL-SCNC: 137 MMOL/L (ref 133–144)
SPECIMEN VOL ?TM UR: 3100 ML
TRIGL SERPL-MCNC: 109 MG/DL
WBC # BLD AUTO: 3 10E9/L (ref 4–11)

## 2021-04-27 PROCEDURE — 80048 BASIC METABOLIC PNL TOTAL CA: CPT | Performed by: INTERNAL MEDICINE

## 2021-04-27 PROCEDURE — 99213 OFFICE O/P EST LOW 20 MIN: CPT | Mod: 25 | Performed by: INTERNAL MEDICINE

## 2021-04-27 PROCEDURE — 85025 COMPLETE CBC W/AUTO DIFF WBC: CPT | Performed by: INTERNAL MEDICINE

## 2021-04-27 PROCEDURE — 99397 PER PM REEVAL EST PAT 65+ YR: CPT | Performed by: INTERNAL MEDICINE

## 2021-04-27 PROCEDURE — 36415 COLL VENOUS BLD VENIPUNCTURE: CPT | Performed by: INTERNAL MEDICINE

## 2021-04-27 PROCEDURE — 80061 LIPID PANEL: CPT | Performed by: INTERNAL MEDICINE

## 2021-04-27 ASSESSMENT — ENCOUNTER SYMPTOMS
HEADACHES: 1
NAUSEA: 0
SHORTNESS OF BREATH: 0
ABDOMINAL PAIN: 0
JOINT SWELLING: 0
PARESTHESIAS: 0
DIARRHEA: 0
FREQUENCY: 0
CHILLS: 0
DYSURIA: 0
FEVER: 0
HEMATURIA: 0
EYE PAIN: 0
BREAST MASS: 0
SORE THROAT: 0
HEMATOCHEZIA: 0
CONSTIPATION: 1
COUGH: 0
DIZZINESS: 0
HEARTBURN: 0
ARTHRALGIAS: 0
MYALGIAS: 0
NERVOUS/ANXIOUS: 0
PALPITATIONS: 0
WEAKNESS: 0

## 2021-04-27 ASSESSMENT — MIFFLIN-ST. JEOR: SCORE: 1203.6

## 2021-04-27 ASSESSMENT — ACTIVITIES OF DAILY LIVING (ADL): CURRENT_FUNCTION: NO ASSISTANCE NEEDED

## 2021-04-27 ASSESSMENT — PAIN SCALES - GENERAL: PAINLEVEL: NO PAIN (0)

## 2021-04-27 NOTE — Clinical Note
12/17/2019- DEXA in [facility] clinic in [place] in [facility]  11/10/2014- colonoscopy in [facility]

## 2021-04-27 NOTE — PROGRESS NOTES
"   SUBJECTIVE:   CC: Kayleigh Sousa is an 65 year old woman who presents for preventive health visit.     Patient has been advised of split billing requirements and indicates understanding: Yes     Last colonosocpy was in 2014, needs repeat in 5 years.  Has mastectomy for breast cancer. Doesn't need pap smear.  12/17/2019- DEXA in [facility] in [place] in care everywhere shows osteopenia.    Patient has hypertension with hypokalemia and was on multiple medications.  I do labs to rule out secondary cause of hypertension which was positive for elevated renin aldosterone ratio.  Patient is seeing endocrinologist and they have added additional labs.  Currently on triamterene hydrochlorothiazide 1 tablet and amlodipine and lisinopril.  Patient is also taking potassium supplement.  Her kidney function was slowly declining.  Ultrasound of the kidney was negative for renal artery stenosis or any obstruction worsening kidney function.  Blood pressures under good control today.  Patient is working with pharmacist per endocrinology recommendation to stop triamterene and have additional testing.      Healthy Habits:     In general, how would you rate your overall health?  Good    Frequency of exercise:  4-5 days/week    Duration of exercise:  15-30 minutes    Do you usually eat at least 4 servings of fruit and vegetables a day, include whole grains    & fiber and avoid regularly eating high fat or \"junk\" foods?  Yes    Taking medications regularly:  Yes    Medication side effects:  Other    Ability to successfully perform activities of daily living:  No assistance needed    Home Safety:  No safety concerns identified    Hearing Impairment:  No hearing concerns    In the past 6 months, have you been bothered by leaking of urine?  No    In general, how would you rate your overall mental or emotional health?  Fair      PHQ-2 Total Score: 1    Additional concerns today:  No    Today's PHQ-2 Score:   PHQ-2 ( 1999 Pfizer) " 4/27/2021   Q1: Little interest or pleasure in doing things 1   Q2: Feeling down, depressed or hopeless 0   PHQ-2 Score 1   Q1: Little interest or pleasure in doing things Several days   Q2: Feeling down, depressed or hopeless Not at all   PHQ-2 Score 1     Abuse: Current or Past (Physical, Sexual or Emotional) - No  Do you feel safe in your environment? Yes    Have you ever done Advance Care Planning? (For example, a Health Directive, POLST, or a discussion with a medical provider or your loved ones about your wishes): No, advance care planning information given to patient to review.  Patient plans to discuss their wishes with loved ones or provider.      Social History     Tobacco Use     Smoking status: Never Smoker     Smokeless tobacco: Never Used   Substance Use Topics     Alcohol use: Not Currently       Alcohol Use 4/27/2021   Prescreen: >3 drinks/day or >7 drinks/week? Not Applicable     Reviewed orders with patient.  Reviewed health maintenance and updated orders accordingly - Yes  Mammogram Screening: S/p mastectomy    History of abnormal Pap smear: NO - age 65 - see link Cervical Cytology Screening Guidelines     Reviewed and updated as needed this visit by clinical staff  Tobacco  Allergies  Meds  Problems  Med Hx  Surg Hx  Fam Hx  Soc Hx          Reviewed and updated as needed this visit by Provider   Allergies  Meds  Problems  Med Hx           Past Medical History:   Diagnosis Date     Benign essential hypertension      Malignant neoplasm of upper-outer quadrant of left breast in female, estrogen receptor positive (H)       Past Surgical History:   Procedure Laterality Date     APPENDECTOMY  2011     MASTECTOMY Bilateral 2019     TOE SURGERY Right        Review of Systems   Constitutional: Negative for chills and fever.   HENT: Negative for congestion, ear pain, hearing loss and sore throat.    Eyes: Negative for pain and visual disturbance.   Respiratory: Negative for cough and shortness  "of breath.    Cardiovascular: Negative for chest pain, palpitations and peripheral edema.   Gastrointestinal: Positive for constipation. Negative for abdominal pain, diarrhea, heartburn, hematochezia and nausea.   Breasts:  Negative for breast mass and discharge.   Genitourinary: Negative for dysuria, frequency, genital sores, hematuria, pelvic pain, urgency, vaginal bleeding and vaginal discharge.   Musculoskeletal: Negative for arthralgias, joint swelling and myalgias.   Skin: Negative for rash.   Neurological: Positive for headaches. Negative for dizziness, weakness and paresthesias.   Psychiatric/Behavioral: Negative for mood changes. The patient is not nervous/anxious.         OBJECTIVE:   /78   Pulse 101   Temp 97.5  F (36.4  C) (Axillary)   Resp 14   Ht 1.6 m (5' 3\")   Wt 68.9 kg (152 lb)   LMP  (LMP Unknown)   SpO2 99%   Breastfeeding No   BMI 26.93 kg/m    Physical Exam  GENERAL: healthy, alert and no distress  EYES: Eyes grossly normal to inspection, PERRL and conjunctivae and sclerae normal  HENT: ear canals and TM's normal, nose and mouth without ulcers or lesions  NECK: no adenopathy, no asymmetry, masses, or scars and thyroid normal to palpation  RESP: lungs clear to auscultation - no rales, rhonchi or wheezes  CV: regular rate and rhythm, normal S1 S2, no S3 or S4, no murmur, click or rub, no peripheral edema and peripheral pulses strong  ABDOMEN: soft, nontender, no hepatosplenomegaly, no masses and bowel sounds normal  MS: no gross musculoskeletal defects noted, no edema  SKIN: no suspicious lesions or rashes  NEURO: Normal strength and tone, mentation intact and speech normal  PSYCH: mentation appears normal, affect normal/bright      ASSESSMENT/PLAN:   Kayleigh was seen today for medicare visit.    Diagnoses and all orders for this visit:    Encounter for Medicare annual wellness exam    Essential hypertension  -     Basic metabolic panel  (Ca, Cl, CO2, Creat, Gluc, K, Na, BUN)  -     " "CBC with platelets and differential    Stage 3b chronic kidney disease  -     Basic metabolic panel  (Ca, Cl, CO2, Creat, Gluc, K, Na, BUN)  -     CBC with platelets and differential    Encounter for lipid screening for cardiovascular disease  -     Lipid panel reflex to direct LDL Fasting    Repeat labs ordered to check kidney function.  Other labs ordered including lipid panel CBC for physical.    Follow-up: 2 to 3 months or earlier as needed.    Patient has been advised of split billing requirements and indicates understanding: Yes  COUNSELING:  Reviewed preventive health counseling, as reflected in patient instructions       Regular exercise       Healthy diet/nutrition    Estimated body mass index is 26.93 kg/m  as calculated from the following:    Height as of this encounter: 1.6 m (5' 3\").    Weight as of this encounter: 68.9 kg (152 lb).    Weight management plan: Discussed healthy diet and exercise guidelines    She reports that she has never smoked. She has never used smokeless tobacco.      Counseling Resources:  ATP IV Guidelines  Pooled Cohorts Equation Calculator  Breast Cancer Risk Calculator  BRCA-Related Cancer Risk Assessment: FHS-7 Tool  FRAX Risk Assessment  ICSI Preventive Guidelines  Dietary Guidelines for Americans, 2010  USDA's MyPlate  ASA Prophylaxis  Lung CA Screening    Chhaya Carr MD  [facility]  "

## 2021-04-27 NOTE — PATIENT INSTRUCTIONS
Patient Education   Personalized Prevention Plan  You are due for the preventive services outlined below.  Your care team is available to assist you in scheduling these services.  If you have already completed any of these items, please share that information with your care team to update in your medical record.  Health Maintenance Due   Topic Date Due     Osteoporosis Screening  Never done     Discuss Advance Care Planning  Never done     Pneumococcal Vaccine (1 of 4 - PCV13) Never done     Colorectal Cancer Screening  Never done     COVID-19 Vaccine (1) Never done     Cholesterol Lab  Never done     Zoster (Shingles) Vaccine (2 of 3) 08/14/2017     Annual Wellness Visit  Never done

## 2021-04-27 NOTE — LETTER
April 29, 2021      Kayleigh Sousa  [address] [place] [address]        Dear ,    We are writing to inform you of your test results.    Lipids looks good, kidney functions looks little better. Will wait for endocrinology plan to decide if you need to see nephrology.   Rest are within acceptable limi    Resulted Orders   Basic metabolic panel  (Ca, Cl, CO2, Creat, Gluc, K, Na, BUN)   Result Value Ref Range    Sodium 137 133 - 144 mmol/L    Potassium 3.5 3.4 - 5.3 mmol/L    Chloride 104 94 - 109 mmol/L    Carbon Dioxide 33 (H) 20 - 32 mmol/L    Anion Gap <1 (L) 3 - 14 mmol/L    Glucose 102 (H) 70 - 99 mg/dL      Comment:      Fasting specimen    Urea Nitrogen 20 7 - 30 mg/dL    Creatinine 1.49 (H) 0.52 - 1.04 mg/dL    GFR Estimate 36 (L) >60 mL/min/[1.73_m2]      Comment:      Non  GFR Calc  Starting 12/18/2018, serum creatinine based estimated GFR (eGFR) will be   calculated using the Chronic Kidney Disease Epidemiology Collaboration   (CKD-EPI) equation.      GFR Estimate If Black 42 (L) >60 mL/min/[1.73_m2]      Comment:       GFR Calc  Starting 12/18/2018, serum creatinine based estimated GFR (eGFR) will be   calculated using the Chronic Kidney Disease Epidemiology Collaboration   (CKD-EPI) equation.      Calcium 9.7 8.5 - 10.1 mg/dL   Lipid panel reflex to direct LDL Fasting   Result Value Ref Range    Cholesterol 181 <200 mg/dL    Triglycerides 109 <150 mg/dL      Comment:      Fasting specimen    HDL Cholesterol 83 >49 mg/dL    LDL Cholesterol Calculated 76 <100 mg/dL      Comment:      Desirable:       <100 mg/dl    Non HDL Cholesterol 98 <130 mg/dL   CBC with platelets and differential   Result Value Ref Range    WBC 3.0 (L) 4.0 - 11.0 10e9/L    RBC Count 4.74 3.8 - 5.2 10e12/L    Hemoglobin 14.0 11.7 - 15.7 g/dL    Hematocrit 42.4 35.0 - 47.0 %    MCV 90 78 - 100 fl    MCH 29.5 26.5 - 33.0 pg    MCHC 33.0 31.5 - 36.5 g/dL    RDW 13.5 10.0 - 15.0 %    Platelet  Count 249 150 - 450 10e9/L    % Neutrophils 62.8 %    % Lymphocytes 23.3 %    % Monocytes 7.4 %    % Eosinophils 5.1 %    % Basophils 1.4 %    Absolute Neutrophil 1.9 1.6 - 8.3 10e9/L    Absolute Lymphocytes 0.7 (L) 0.8 - 5.3 10e9/L    Absolute Monocytes 0.2 0.0 - 1.3 10e9/L    Absolute Eosinophils 0.2 0.0 - 0.7 10e9/L    Absolute Basophils 0.0 0.0 - 0.2 10e9/L    Diff Method Automated Method        If you have any questions or concerns, please call the clinic at the number listed above.       Sincerely,      Chhaya Carr MD

## 2021-04-28 LAB
COLLECT DURATION TIME SPEC: 25 H
CREAT 24H UR-MRATE: 1116 MG/D (ref 500–1400)
CREAT UR-MCNC: 36 MG/DL
METANEPH 24H UR-MCNC: 17 UG/L
METANEPH 24H UR-MRATE: 53 UG/D (ref 36–229)
METANEPH+NORMETANEPH UR-IMP: NORMAL
METANEPH/CREAT 24H UR: 47 UG/G CRT (ref 0–300)
NORMETANEPHRINE 24H UR-MCNC: 111 UG/L
NORMETANEPHRINE 24H UR-MRATE: 344 UG/D (ref 95–650)
NORMETANEPHRINE/CREAT 24H UR: 308 UG/G CRT (ref 0–400)
SPECIMEN VOL ?TM UR: 3200 ML

## 2021-05-17 NOTE — PROGRESS NOTES
Medication Therapy Management (MTM) Encounter    ASSESSMENT:                            Medication Adherence/Access: No issues identified. Patient would benefit from establishing with [facility] due to many allergies.    Hypertension: Patient is meeting blood pressure goal of < 130/85mmHg per cardiologist. Per endocrinologist recommendation patient needs to come off triamterene to repeat lab work without skewing results. After looking into literature tapering off triamterene is not necessary, and a direct switch from triamterene-hydrochlorothiazide 37/5-25 mg daily to hydrochlorothiazide 25 mg daily is appropriate. Patient's potassium level has been on the lower end of normal, so with the switch to hydrochlorothiazide alone, would recommend close follow-up of potassium. Recommend repeat BMP 2 weeks after switch. Recommend patient continue checking her blood pressure at home due to changing blood pressure meds and several episodes of lightheadedness.  It should be noted that lisinopril may lead to false negatives, amlodipine may may also lead to false negatives, and diuretics can also lead to false negatives when looking at the ARR. Since the patient is on all 3 the ARR should be evaluated closely, and patient is being followed by endocrinologist who is performing additional confirmatory tests relating to hyperaldosteronism.     Breast cancer: Patient would benefit from establishing with an oncologist in the [facility].    Heart Protection: Stable    Vaccines: Patient would benefit from getting her COVID-19 vaccine. Discussed safety of the vaccines and blood clot risk being minimal, but if concerned she can get Pfizer or Moderna. Patient will consider but wanting to discuss more with oncologist.    PLAN:                            1. Start checking your blood pressure at home again. Especially during episodes of lightheadedness.  2. I will reach out to Dr. Carr about establishing with  an oncologist here at [place].  3. I will reach out to Dr. Mcneill about stopping the triamterene/HCTZ and continuing on just hydrochlorothiazide 25 mg daily.  4. Consider getting the COVID-19 vaccine.   5. You can reach the [facility] at [phone]    Follow-up: Return in about 9 weeks (around 7/22/2021) for Medication Therapy Management.      SUBJECTIVE/OBJECTIVE:                          Kayleigh Sousa is a 65 year old female called for a follow-up visit. She was referred to me from Claudette Castillo.  Today's visit is a follow-up MTM visit from 3/18/21.     Reason for visit: She is really happy that lisinopril is working to bring down her blood pressure.    Allergies/ADRs: Reviewed in chart  Tobacco: She reports that she has never smoked. She has never used smokeless tobacco.  Alcohol: not currently using  Caffeine: 2-3 cups/day of tea (peppermint), rarely sodas/day  Activity: walking almost every day    Medication Adherence/Access: Patient takes medications directly from bottles.  Patient takes medications 2 time(s) per day.   Per patient, misses medication 1 times per week. Marks it down on her calendar or in her phone to make sure she takes them.  Medication barriers: none.   The patient fills medications at [place]: NO, fills medications at [facility] in [place].  Wondering about the [facility] pharmacy for future medications.    Hypertension: Current medications include amlodipine 10 mg daily, lisinopril 40 mg daily, triamterene-hydrochlorothiazide 37.5-25 mg 1 tablet daily. Patient also takes potassium chloride 20 mEq daily.   Patient has not been checking blood pressure at home recently due to multiple good readings and feeling good. Per Dr. Pizarro her blood pressure goal is <130/85.  Per endocrinologist Dr. Castano thinks we should come off of triamterene-hydrochlorothiazide. She said Dr. Carr said it may not necessary with blood pressure coming down with lisinopril, but patient  still wanting to complete these tests for piece of mind.  Since switching back to lisinopril patient reports her blood pressure has been really good and is very happy about this. Came off lisinopril in the past due to an article she read linking lisinopril to breast cancer.  Does have occasional lightheadedness. Had this yesterday in the morning.  Historically: Had hair growth with spironolactone and does not want to retry that again. Also had hair growth concerns while on minoxidil.  Patient reports that her pharmacy is very good about trying to find medications that do not have the dyes that she is allergic to. Despite their help she still has many allergies, so it is difficult. She is currently allergic to a dye in her triamterene-hydrochlorothiazide. Is interested in establishing with compounding pharmacy.  Saw Dr. Mcneill (encorinologist) on 4/21 - wanting patient to come off triamterene, then repeat lab work  Saw Dr. Pizarro (cardiologist) on 3/22 - recommended continuing lisinopril. Also recommended seeing specialist for elevated blood pressure. Of note at this visit her blood pressure was 119/76 since starting lisinopril.    BP Readings from Last 3 Encounters:   04/27/21 126/78   04/22/21 (!) 150/88   04/22/21 (!) 161/84     Potassium   Date Value Ref Range Status   04/27/2021 3.5 3.4 - 5.3 mmol/L Final     GFR Estimate   Date Value Ref Range Status   04/27/2021 36 (L) >60 mL/min/[1.73_m2] Final     Comment:     Non  GFR Calc  Starting 12/18/2018, serum creatinine based estimated GFR (eGFR) will be   calculated using the Chronic Kidney Disease Epidemiology Collaboration   (CKD-EPI) equation.       Breast Cancer:  Current medications include: none.  Was anastrazole but stopped it until she got her blood pressure under control, then she will decide whether she wants to go back on hormone therapy. Patient reports she doesn't currently have an oncologist since she switched insurance and started being  seen at [facility]. She is interested in re-establishing with an oncologist.    Heart protection:  Current medications include: aspirin 81 mg daily. No concerns with bruising or bleeding.  The 10-year ASCVD risk score ([place] KOENIG Jr., et al., 2013) is: 7.3%    Values used to calculate the score:      Age: 65 years      Sex: Female      Is Non- : Yes      Diabetic: No      Tobacco smoker: No      Systolic Blood Pressure: 126 mmHg      Is BP treated: Yes      HDL Cholesterol: 83 mg/dL      Total Cholesterol: 181 mg/dL    Recent Labs   Lab Test 04/27/21  1044   CHOL 181   HDL 83   LDL 76   TRIG 109     Vaccines:  Patient wondering if vaccines are safe with her history of breast cancer. Also concerned about the possible clot risk.  Most Recent Immunizations   Administered Date(s) Administered     TDAP Vaccine (Adacel) 05/11/2016     Zoster vaccine, live 06/19/2017       Today's Vitals: LMP  (LMP Unknown)   ----------------      I spent 29 minutes with this patient today. I offer these suggestions for consideration by Dr. Carr. A copy of the visit note was provided to the patient's primary care provider.    The patient was sent via [employer] a summary of these recommendations.     Lizet Doll PharmD  PGY1 Medication Therapy Management Resident  Voicemail: [phone]    Kayleigh Sousa was seen independently by Dr. Doll. I have reviewed and agree with the resident note and plan of care.      Denisse Garcia PharmD [facility]  Medication Therapy Management Provider  Pager #[phone]      Telemedicine Visit Details  Type of service:  Telephone visit  Start Time: 8:03 AM  End Time: 8:32 AM  Originating Location (patient location): Home  Distant Location (provider location):  [facility]      Medication Therapy Recommendations  Benign essential hypertension    Current Medication: lisinopril (ZESTRIL) 40 MG tablet   Rationale: Medication requires monitoring - Needs  additional monitoring   Recommendation: Self-Monitoring - Home BP, goal <130/85   Status: Patient Agreed - Adherence/Education         Essential hypertension    Current Medication: triamterene-HCTZ (DYAZIDE) 37.5-25 MG capsule   Rationale: Undesirable effect - Adverse medication event - Safety   Recommendation: Change Medication - Change to HCTZ 25 mg daily   Status: Contact Provider - Awaiting Response          Rationale: Medication requires monitoring - Needs additional monitoring   Recommendation: Order Lab - BMP for 2 weeks after off of triamterene/HCTZ   Status: Contact Provider - Awaiting Response         Vaccine counseling    Rationale: Preventive therapy - Needs additional medication therapy - Indication   Recommendation: Start Medication - COVID-19 vaccine   Status: Patient Agreed - Adherence/Education

## 2021-05-20 ENCOUNTER — ALLIED HEALTH/NURSE VISIT (OUTPATIENT)
Dept: PHARMACY | Facility: CLINIC | Age: 66
End: 2021-05-20
Payer: COMMERCIAL

## 2021-05-20 DIAGNOSIS — Z71.85 VACCINE COUNSELING: ICD-10-CM

## 2021-05-20 DIAGNOSIS — I10 BENIGN ESSENTIAL HYPERTENSION: Primary | ICD-10-CM

## 2021-05-20 DIAGNOSIS — Z79.82 CURRENT USE OF ASPIRIN: ICD-10-CM

## 2021-05-20 DIAGNOSIS — Z85.3 HX: BREAST CANCER: ICD-10-CM

## 2021-05-20 PROCEDURE — 99606 MTMS BY PHARM EST 15 MIN: CPT | Performed by: PHARMACIST

## 2021-05-20 PROCEDURE — 99607 MTMS BY PHARM ADDL 15 MIN: CPT | Performed by: PHARMACIST

## 2021-05-20 NOTE — PATIENT INSTRUCTIONS
Recommendations from today's MTM visit:                                                    As I mentioned during our visit, my last day is June 30th. I scheduled you with Denisse Garcia, another Pharmacist here in the clinic, for mid-July. Please let me know if you have any questions before this time!    1. Start checking your blood pressure at home again. Especially during episodes of lightheadedness.    2. I will reach out to Dr. Carr about a referral to oncology her at [place] so you can get established with a new oncologist.    3. I will reach out to Dr. Mcneill about stopping the triamterene/HCTZ and continuing on just hydrochlorothiazide 25 mg daily.    4. Consider getting the COVID-19 vaccine.     5. You can reach the [facility] at [phone]    Follow-up: Return in about 9 weeks (around 7/22/2021) for Medication Therapy Management.    It was great to speak with you today.  I value your experience and would be very thankful for your time with providing feedback on our clinic survey. You may receive a survey via email or text message in the next few days.     To schedule another MTM appointment, please call the clinic directly or you may call the MTM scheduling line at [phone] or toll-free at [phone].     My Clinical Pharmacist's contact information:                                                      Please feel free to contact me with any questions or concerns you have.      Lizet Doll, PharmD  PGY1 Medication Therapy Management Resident  Voicemail: [phone]

## 2021-05-24 RX ORDER — HYDROCHLOROTHIAZIDE 25 MG/1
25 TABLET ORAL DAILY
Qty: 30 TABLET | Refills: 3 | Status: SHIPPED | OUTPATIENT
Start: 2021-05-24 | End: 2021-06-28

## 2021-05-24 NOTE — PROGRESS NOTES
Dr. Mcneill agreed with switching directly to hydrochlorothiazide 25 mg daily from hydrochlorothiazide/triamterene combo. Agreed with plan to recheck potassium in 2 weeks. Medications ordered and lab placed. Patient notified of this via [employer].    Lizet Doll, PharmD  PGY1 Medication Therapy Management Resident  Voicemail: [phone]

## 2021-05-26 ENCOUNTER — OFFICE VISIT (OUTPATIENT)
Dept: INTERNAL MEDICINE | Facility: CLINIC | Age: 66
End: 2021-05-26
Payer: COMMERCIAL

## 2021-05-26 VITALS
RESPIRATION RATE: 14 BRPM | DIASTOLIC BLOOD PRESSURE: 92 MMHG | OXYGEN SATURATION: 99 % | TEMPERATURE: 97.9 F | HEART RATE: 81 BPM | SYSTOLIC BLOOD PRESSURE: 153 MMHG | HEIGHT: 63 IN | WEIGHT: 152 LBS | BODY MASS INDEX: 26.93 KG/M2

## 2021-05-26 DIAGNOSIS — I10 ESSENTIAL HYPERTENSION: Primary | ICD-10-CM

## 2021-05-26 DIAGNOSIS — F43.9 SITUATIONAL STRESS: ICD-10-CM

## 2021-05-26 PROCEDURE — 99214 OFFICE O/P EST MOD 30 MIN: CPT | Performed by: INTERNAL MEDICINE

## 2021-05-26 RX ORDER — ESCITALOPRAM OXALATE 10 MG/1
10 TABLET ORAL DAILY
Qty: 30 TABLET | Refills: 1 | Status: SHIPPED | OUTPATIENT
Start: 2021-05-26 | End: 2021-07-22 | Stop reason: SINTOL

## 2021-05-26 ASSESSMENT — ENCOUNTER SYMPTOMS
CONFUSION: 0
PALPITATIONS: 0
NERVOUS/ANXIOUS: 1
SHORTNESS OF BREATH: 0

## 2021-05-26 ASSESSMENT — ANXIETY QUESTIONNAIRES
1. FEELING NERVOUS, ANXIOUS, OR ON EDGE: NEARLY EVERY DAY
IF YOU CHECKED OFF ANY PROBLEMS ON THIS QUESTIONNAIRE, HOW DIFFICULT HAVE THESE PROBLEMS MADE IT FOR YOU TO DO YOUR WORK, TAKE CARE OF THINGS AT HOME, OR GET ALONG WITH OTHER PEOPLE: NOT DIFFICULT AT ALL
2. NOT BEING ABLE TO STOP OR CONTROL WORRYING: NOT AT ALL
7. FEELING AFRAID AS IF SOMETHING AWFUL MIGHT HAPPEN: SEVERAL DAYS
3. WORRYING TOO MUCH ABOUT DIFFERENT THINGS: SEVERAL DAYS
GAD7 TOTAL SCORE: 6
6. BECOMING EASILY ANNOYED OR IRRITABLE: SEVERAL DAYS
5. BEING SO RESTLESS THAT IT IS HARD TO SIT STILL: NOT AT ALL

## 2021-05-26 ASSESSMENT — MIFFLIN-ST. JEOR: SCORE: 1203.6

## 2021-05-26 ASSESSMENT — PATIENT HEALTH QUESTIONNAIRE - PHQ9
SUM OF ALL RESPONSES TO PHQ QUESTIONS 1-9: 4
5. POOR APPETITE OR OVEREATING: NOT AT ALL

## 2021-05-26 NOTE — PROGRESS NOTES
"    Assessment & Plan   Problem List Items Addressed This Visit        Circulatory    Essential hypertension - Primary      Other Visit Diagnoses     Situational stress        Relevant Medications    escitalopram (LEXAPRO) 10 MG tablet         Continue to work with pharmacist and endocrinology for adjusting the medicine. Advised to call if blood pressure is running high. Advised 2 weeks follow-up for nurse only BP check after stopping triamterene.  Lexapro started for situational stress. Coping mechanisms discussed.    Return in about 4 weeks (around 6/23/2021), or 2 week for BP check up.    Chhaya Carr MD  [facility] is [facility] 65 year [facility] who presents for the following health issues ; follow up.    HPI   On amlodipine 10 mg, triamterene hydrochlorothiazide, lisinopril for HTN. BP is high today and feels its due to depression/anxiety. Plan is to stop the triamterene and give hydrochlorothiazide as her aldosterone was high and she is working with endocrinologist and pharmacist before doing additional blood work.    Patient has some relationship issues with her  and is stressed about it.    PHQ 5/26/2021   PHQ-9 Total Score 4   Q9: Thoughts of better off dead/self-harm past 2 weeks Not at all       ANTHONY-7 SCORE 5/26/2021   Total Score 6       Review of Systems   Respiratory: Negative for shortness of breath.    Cardiovascular: Negative for chest pain and palpitations.   Psychiatric/Behavioral: Negative for confusion and mood changes. The patient is nervous/anxious.           Objective    BP (!) 153/92   Pulse 81   Temp 97.9  F (36.6  C) (Oral)   Resp 14   Ht 1.6 m (5' 3\")   Wt 68.9 kg (152 lb)   LMP  (LMP Unknown)   SpO2 99%   Breastfeeding No   BMI 26.93 kg/m    Body mass index is 26.93 kg/m .  Physical Exam  Vitals signs reviewed.   Cardiovascular:      Rate and Rhythm: Normal rate.      Heart sounds: No murmur.   Pulmonary:      Effort: Pulmonary " effort is normal.   Skin:     General: Skin is warm.   Neurological:      Mental Status: She is alert.   Psychiatric:         Mood and Affect: Mood normal.         Behavior: Behavior normal.         Thought Content: Thought content normal.

## 2021-05-26 NOTE — PATIENT INSTRUCTIONS
Patient Education     Established High Blood Pressure    High blood pressure (hypertension) is a long-term (chronic) disease. Often healthcare providers don t know what causes it. But it can be caused by certain health conditions and medicines.  If you have high blood pressure, you may not have any symptoms. If you do have symptoms, they may include:    Headache    Dizziness    Changes in your vision    Chest pain    Shortness of breath  But even without symptoms, high blood pressure that s not treated raises your risk for heart attack, heart failure, kidney disease, and stroke. High blood pressure is a serious health risk and shouldn t be ignored.  Blood pressure measurements are given as 2 numbers. Systolic blood pressure is the upper number. This is the pressure when the heart contracts. Diastolic blood pressure is the lower number. This is the pressure when the heart relaxes between beats. You will see your blood pressure readings written together. For example, a person with a systolic pressure of 118 and a diastolic pressure of 78 will have 118/78 written in the medical record.  Blood pressure is classified as normal, raised (elevated) or stage 1 or stage 2 high blood pressure:    Normal blood pressure. Systolic of less than 120 and diastolic of less than 80 (120/80).    Elevated blood pressure. Systolic of 120 to 129 and diastolic less than 80.    Stage 1 high blood pressure. Systolic is 130 to 139 or diastolic between 80 to 89.    Stage 2 high blood pressure. Systolic is 140 or higher or the diastolic is 90 or higher.  Home care  If you have high blood pressure, follow these home care guidelines to help lower your blood pressure. If you are taking medicines for high blood pressure, these methods may reduce or end your need for medicines in the future.    Start a weight-loss program if you are overweight.    Cut back on how much salt you get in your diet. Here s how to do this:  ? Don t eat foods that have a  lot of salt. These include olives, pickles, smoked meats, and salted potato chips.  ? Don t add salt to your food at the table.  ? Use only small amounts of salt when cooking.    Start an exercise program. Talk with your healthcare provider about the type of exercise program that would be best for you. It doesn't have to be hard. Even brisk walking for 20 minutes 3 times a week is a good form of exercise.    Don t take medicines that stimulate the heart. This includes many over-the-counter cold and sinus decongestant pills and sprays, as well as diet pills. Check the warnings about high blood pressure on the label. Before buying any over-the-counter medicines or supplements, always ask the pharmacist about the product's possible interaction with your high blood pressure and your high blood pressure medicines.    Stimulants such as amphetamine or cocaine could be deadly for someone with high blood pressure. Never take these.    Limit how much caffeine you get in your diet. Switch to caffeine-free products.    Stop smoking. If you are a long-time smoker, this can be hard. Talk with your healthcare provider about medicines and nicotine replacement options to help you. Also join a stop-smoking program . This makes it more likely that you will quit for good.    Learn how to handle stress. This is an important part of any program to lower blood pressure. Learn about relaxation methods such as meditation, yoga, or biofeedback.    If your provider prescribed medicines, take them exactly as directed. Missing doses may cause your blood pressure get out of control.    If you miss a dose, check with your healthcare provider or pharmacist about what to do.    Think about buying an automatic blood pressure machine to check your blood pressure at home. Ask your provider for a recommendation. You can get one of these at most pharmacies.  Using a home blood pressure monitor  The American Heart Association advises the following  guidelines for home blood pressure monitoring:    Don't smoke or drink coffee for 30 minutes before taking your blood pressure.    Go to the bathroom before the test.    Relax for 5 minutes before taking the measurement.    Sit with your back supported (don't sit on a couch or soft chair). Keep your feet on the floor uncrossed. Place your arm on a solid flat surface (such as a table) with the upper part of the arm at heart level. Place the middle of the cuff directly above the bend of the elbow. Check the monitor's instruction manual for an illustration.    Take multiple readings. When you measure, take 2 to 3 readings one minute apart and record all of the results.    Take your blood pressure at the same time every day, or as your healthcare provider advises.    Record the date, time, and blood pressure reading.    Take the record with you to your next healthcare appointment. If your blood pressure monitor has a built-in memory, just take the monitor with you to your next appointment.    Call your provider if you have several high readings. Don't be frightened by one high blood pressure reading. But if you get a few high readings, check in with your healthcare provider.  Follow-up care  You will need to see your healthcare provider regularly. This is to check your blood pressure and to make changes to your medicines. Make a follow-up appointment as directed. Bring the record of your home blood pressure readings to the appointment.  Call 911  Call 911 if you have any of these:    Blood pressure of 180/120 or higher    Chest pain or shortness of breath    Weakness of an arm or leg or one side of the face    Problems speaking or seeing     When to get medical advice  Call your healthcare provider right away if any of these occur:    Severe headache    Throbbing or rushing sound in the ears    Nosebleed    Sudden severe pain in your belly (abdomen)    Extreme drowsiness, confusion, or fainting    Dizziness or spinning  feeling (vertigo)  [employer] last reviewed this educational content on 7/1/2019 2000-2021 [employer]. All rights reserved. This information is not intended as a substitute for professional medical care. Always follow your healthcare professional's instructions.

## 2021-05-27 ASSESSMENT — ANXIETY QUESTIONNAIRES: GAD7 TOTAL SCORE: 6

## 2021-05-28 NOTE — TELEPHONE ENCOUNTER
Refill Approved    Rx renewed per Medication Renewal Policy. Medication was last renewed on 9/18/18.    Last office visit 6/25/18    Raheem Kim, [facility] Connection Triage/Med Refill 4/29/2019     Requested Prescriptions   Pending Prescriptions Disp Refills     amLODIPine (NORVASC) 10 MG tablet [Pharmacy Med Name: amLODIPine Besylate Oral Tablet 10 MG] 90 tablet 1     Sig: TAKE ONE TABLET BY MOUTH ONE TIME DAILY       Calcium-Channel Blockers Protocol Passed - 4/29/2019  9:22 AM        Passed - PCP or prescribing provider visit in past 12 months or next 3 months     Last office visit with prescriber/PCP: Visit date not found OR same dept: Visit date not found OR same specialty: Visit date not found  Last physical: 6/25/2018 Last MTM visit: Visit date not found   Next visit within 3 mo: Visit date not found  Next physical within 3 mo: Visit date not found  Prescriber OR PCP: Vadim Whitaker MD  Last diagnosis associated with med order: There are no diagnoses linked to this encounter.  If protocol passes may refill for 12 months if within 3 months of last provider visit (or a total of 15 months).             Passed - Blood pressure filed in past 12 months     BP Readings from Last 1 Encounters:   07/10/18 120/70

## 2021-05-28 NOTE — TELEPHONE ENCOUNTER
Patient Returning Call  Reason for call:  Patient returning call to check on the status of this request.  Information relayed to patient:  Pending providers review and approval.  Patient has additional questions:  yes  If YES, what are your questions/concerns:  Patient stated I am totally out of this medication and request this to be filled ASAP.   Okay to leave a detailed message?: Yes

## 2021-05-29 NOTE — PROGRESS NOTES
Office Visit - Physical   Kayleigh HELENA Sousa   63 y.o.  female in for  annual physical examination.    Date of visit: 6/17/2019  Physician: Vadim Whitaker MD     Assessment and Plan   1. Routine general medical examination at a health care facility  Normal examination for this nearly 64-year-old.  She will be retiring soon.  Only complaint is some mild facial hair.    2. Essential hypertension  Today's blood pressure is 132 80.  Continue the same medications.  Denies TIAs, claudication, anginal-like pains.  I suggested an EKG today but she says she does not have time.  - Lipid Cascade  - Urinalysis-UC if Indicated  - Electrocardiogram Perform and Read    3. Hx of adenomatous colonic polyps  Colonoscopy due this year.    4. History of breast cancer  She is due for mammogram.  No palpable masses.    5. Medication management  No trouble with current medications.  - HM1(CBC and Differential)  - Comprehensive Metabolic Panel  - HM1 (CBC with Diff)      The following high BMI interventions were performed this visit: encouragement to exercise    No follow-ups on file.     Chief Complaint   Chief Complaint   Patient presents with     Annual Exam     not fasting        Patient Profile   Social History     Social History Narrative    , 2 children        Past Medical History   Patient Active Problem List   Diagnosis     Essential hypertension     History of breast cancer     Hx of adenomatous colonic polyps       Past Surgical History  She has a past surgical history that includes Cyst Removal (Right); Breast lumpectomy (Right, 2008); and Appendectomy (2011).     History of Present Illness   This 63 y.o. old female in for physical exam.  No chest pain or any orthopnea  No cough wheezing or asthma  No dysphasia.  No change in bowel habits.  No melena  No dysuria hematuria.  One episode of kidney stones 8 years ago.  No recurrence.  No migraine, TIAs, epilepsy.  No neuropathy.  No tremor.  No musculoskeletal complaints.  " Occasional very minor right hip discomfort.  No low back pain.  No sciatica.  No dermatologic issues  Family history of diabetes.  Patient has no polyuria or polydipsia.  Non-smoker nondrinker.    Review of Systems: A comprehensive review of systems was negative except as noted.     Medications and Allergies   Current Outpatient Medications   Medication Sig Dispense Refill     amLODIPine (NORVASC) 10 MG tablet Take 1 tablet (10 mg total) by mouth daily. 90 tablet 4     aspirin 81 mg chewable tablet Chew.       lisinopril (PRINIVIL,ZESTRIL) 20 MG tablet TAKE ONE TABLET BY MOUTH ONE TIME DAILY  90 tablet 3     triamterene-hydrochlorothiazide (DYAZIDE) 37.5-25 mg per capsule TAKE ONE CAPSULE BY MOUTH ONE TIME DAILY  90 capsule 3     No current facility-administered medications for this visit.      Allergies   Allergen Reactions     D And C Red No.40 Unknown     Fd And C No.5 (Tartrazine) Unknown     Fd And C Yellow No.6 (Sunset Yellow Fcf) Rash        Family and Social History   Family History   Problem Relation Age of Onset     Hypertension Mother      Hypertension Father      Heart attack Father         Social History     Tobacco Use     Smoking status: Never Smoker     Smokeless tobacco: Never Used   Substance Use Topics     Alcohol use: Not on file     Drug use: Not on file        Physical Exam   General Appearance:   Healthy-appearing woman in no distress.  Blood pressure 132/80.  Pulse 72 and regular.    Ht 5' 2.5\" (1.588 m)   Wt 164 lb (74.4 kg)   BMI 29.52 kg/m      EYES: Eyelids, conjunctiva, and sclera were normal. Pupils were normal. Cornea, iris, and lens were normal bilaterally.  Early cataract on the left.  HEAD, EARS, NOSE, MOUTH, AND THROAT: Head and face were normal. Hearing was normal to voice and the ears were normal to external exam. Nose appearance was normal and there was no discharge. Oropharynx was normal.  NECK: Neck appearance was normal. There were no neck masses and the thyroid was not " enlarged.  No bruits.  RESPIRATORY: Breathing pattern was normal and the chest moved symmetrically.  Percussion/auscultatory percussion was normal.  Lung sounds were normal and there were no abnormal sounds.  CARDIOVASCULAR: Heart rate and rhythm were normal.  S1 and S2 were normal and there were no extra sounds or murmurs. Peripheral pulses in arms and legs were normal.  Jugular venous pressure was normal.  There was no peripheral edema.  Occasional premature beat heard.  GASTROINTESTINAL: The abdomen was normal in contour.  Bowel sounds were present.  Percussion detected no organ enlargement or tenderness.  Palpation detected no tenderness, mass, or enlarged organs.   MUSCULOSKELETAL: Skeletal configuration was normal and muscle mass was normal for age. Joint appearance was overall normal.  LYMPHATIC: There were no enlarged nodes.  SKIN/HAIR/NAILS: Skin color was normal.  There were no skin lesions.  Hair and nails were normal.  NEUROLOGIC: The patient was alert and oriented to person, place, time, and circumstance. Speech was normal. Cranial nerves were normal. Motor strength was normal for age. The patient was normally coordinated.  PSYCHIATRIC:  Mood and affect were normal and the patient had normal recent and remote memory. The patient's judgment and insight were normal.    ADDITIONAL VITAL SIGNS: Pulse 72.  CHEST WALL/BREASTS: No palpable masses or visible abdomen  RECTAL: Not checked.  GENITAL/URINARY: Due for gynecologic exam this year.     Additional Information        Vadim Whitaker MD  Internal Medicine  Contact me at [phone]

## 2021-05-31 ENCOUNTER — RECORDS - HEALTHEAST (OUTPATIENT)
Dept: ADMINISTRATIVE | Facility: CLINIC | Age: 66
End: 2021-05-31

## 2021-05-31 VITALS — HEIGHT: 63 IN | WEIGHT: 163 LBS | BODY MASS INDEX: 28.88 KG/M2

## 2021-05-31 NOTE — TELEPHONE ENCOUNTER
Per request of Dr. Josefina Chavez's specialty , I called Kayleigh to talk with her about an appointment to see Dr. Hernandez for her newly diagnosed breast cancer.  I told her that Dr. Hernandez can see her on Tuesday, 9-10-19 at 1220/1200 check in at the [facility].  I talked with her about what to expect at this appointment.    Kayleigh said she has a history of right breast cancer in 2008.  Dr. Hernandez performed a lumpectomy and she had radiation therapy done at [facility].  She is unable to recall who she saw for medical oncology at the time.    Support and encouragement provided.  I have given her my contact information and invited calls.

## 2021-05-31 NOTE — TELEPHONE ENCOUNTER
Medication Request  Medication name: Potassium CL 10 meq  Pharmacy Name and Location: [facility] #[id number]  Reason for request: Request came by fax from pharmacy  When did you use medication last?:  unknown  Patient offered appointment:  Request came by fax from pharmacy  Okay to leave a detailed message: no

## 2021-05-31 NOTE — TELEPHONE ENCOUNTER
Report received. Will give to Dr. Trevino in the morning.  Marcelina Crockett CMA ............... 4:12 PM, 08/21/19       vomiting

## 2021-05-31 NOTE — TELEPHONE ENCOUNTER
Dr. Whitaker,  Okay for refill requested for potassium chloride 20 meq?  It is not currently on the patient's medication list, it looks like it had been discontinued.  Previously the patient was taking 1 tablet daily.    Please advise.  Thank you.  Kirsty FLORIAN CMA/FAINA....................10:09 AM

## 2021-05-31 NOTE — TELEPHONE ENCOUNTER
Im not able to find it either. I called [facility] for the report. Report will be faxed here to the [facility].

## 2021-05-31 NOTE — TELEPHONE ENCOUNTER
I cannot find the August 16 mammography imaging report that she refers to.  Can you help me find it?

## 2021-05-31 NOTE — TELEPHONE ENCOUNTER
"Test Results  Who is calling?:  Patient   Who ordered the test:  No order. Done as an annual exam, which is then sent to Dr Whitaker for review.  Type of test: Imaging, mammogram   Date of test:  8/16/2019  Where was the test performed:  [facility]   What are your questions/concerns?:  Patient states she contact the imaging staff and was instructed to contact her PCP as the findings were \"suspect\". She is aware that Dr Whitaker is out of the clinic until 8/26/2019.  Okay to leave a detailed message?:  Yes    "

## 2021-05-31 NOTE — TELEPHONE ENCOUNTER
I just spoke to Ms. Sousa and told her about the mammogram report dated 8/16/2019 showing a small mass far upper outer quadrant left breast.  She told me that [facility] has already called her and that she will be going into the [facility] tomorrow for follow-up images.  She told me that she is unable to palpate any changes in the left breast.  I told her to not be alarmed, and that subsequent decisions will be based on the follow-up imaging.

## 2021-05-31 NOTE — TELEPHONE ENCOUNTER
Johan Cox for orders requested below?  Please advise.  Thank you.  Kirsty FLORIAN, ZIGGY/FAINA....................12:57 PM

## 2021-06-01 ENCOUNTER — MYC MEDICAL ADVICE (OUTPATIENT)
Dept: CARDIOLOGY | Facility: CLINIC | Age: 66
End: 2021-06-01

## 2021-06-01 VITALS — HEIGHT: 63 IN | WEIGHT: 166 LBS | BODY MASS INDEX: 29.41 KG/M2

## 2021-06-01 VITALS — BODY MASS INDEX: 29.41 KG/M2 | HEIGHT: 63 IN | WEIGHT: 166 LBS

## 2021-06-01 NOTE — PROGRESS NOTES
"[facility] with Dr. Hernandez regarding her newly diagnosed breast cancer.  She has a history of right breast cancer approx 11 years ago.  RN assessment and EMR update.  /84 (Patient Site: Left Arm, Patient Position: Sitting)   Pulse 68   Resp 16   Ht 5' 2.5\" (1.588 m)   Wt 160 lb 12.8 oz (72.9 kg)   SpO2 96%   BMI 28.94 kg/m  .  Patient given RN and MD cards and information on scheduling.  She met with Dr. Hernandez, see dictation for details.  She will plan to follow up post op.  RN time 12 mins  "

## 2021-06-01 NOTE — PROGRESS NOTES
Preoperative Exam    Scheduled Procedure: Mastectomy bilateral   Surgery Date:  9/25/19  Surgery Location: [facility] / [facility], fax [phone]    Surgeon:  Dr. Hernandez    Assessment/Plan:     1. Preop general physical exam  Normal exam today.  She is in excellent health.  We can proceed with surgery and anesthesia as planned.    2. Malignant neoplasm of female breast, unspecified estrogen receptor status, unspecified laterality, unspecified site of breast (H)  Most recent cancer is on the left side.  2008 she had a cancer on the right side.  - HM2(CBC w/o Differential)    3. Essential hypertension  Today's blood pressure is 132/80.  She will take amlodipine on the morning of surgery.  She will hold lisinopril and her diuretic.  - Electrocardiogram Perform and Read  - Basic Metabolic Panel        Surgical Procedure Risk: Low (reported cardiac risk generally < 1%)  Have you had prior anesthesia?: Yes  Have you or any family members had a previous anesthesia reaction:  No  Do you or any family members have a history of a clotting or bleeding disorder?: No  Cardiac Risk Assessment: no increased risk for major cardiac complications    APPROVAL GIVEN to proceed with proposed procedure, without further diagnostic evaluation        Functional Status: Independent  Patient plans to recover at home with family.     Subjective:      Kayleigh Sousa is a 64 y.o. female who presents for a preoperative consultation.  Recent abnormal mammogram on the left side.  Cancer.  She is opted for bilateral mastectomies since she had a cancer in the right breast 11 years ago.  Medically, she is been quite healthy.  No cardiopulmonary symptoms and no recent infections.    All other systems reviewed and are negative, other than those listed in the HPI.    Pertinent History  Do you have difficulty breathing or chest pain after walking up a flight of stairs: No  History of obstructive sleep apnea: No  Steroid use in the last 6 months:  No  Frequent Aspirin/NSAID use: Yes: ASA 81mg  Prior Blood Transfusion: No  Prior Blood Transfusion Reaction: No  If for some reason prior to, during or after the procedure, if it is medically indicated, would you be willing to have a blood transfusion?:  The patient REFUSES transfusion.    Current Outpatient Medications   Medication Sig Dispense Refill     amLODIPine (NORVASC) 10 MG tablet Take 1 tablet (10 mg total) by mouth daily. 90 tablet 3     aspirin 81 mg chewable tablet Chew.       lisinopril (PRINIVIL,ZESTRIL) 20 MG tablet Take 1 tablet (20 mg total) by mouth daily. 90 tablet 3     potassium chloride (K-DUR,KLOR-CON) 20 MEQ tablet Take 1 tablet (20 mEq total) by mouth daily. 30 tablet 11     triamterene-hydrochlorothiazide (DYAZIDE) 37.5-25 mg per capsule Take 1 capsule by mouth daily. 90 capsule 3     No current facility-administered medications for this visit.         Allergies   Allergen Reactions     D And C Red No.40 Unknown     Fd And C No.5 (Tartrazine) Unknown     Fd And C Yellow No.6 (Sunset Yellow Fcf) Rash       Patient Active Problem List   Diagnosis     Essential hypertension     History of breast cancer     Hx of adenomatous colonic polyps     Medication management     Cancer of left breast (H)       Past Medical History:   Diagnosis Date     Cancer (H)      Hypertension      Leukopenia     mild-3100 wbc     PAC (premature atrial contraction)        Past Surgical History:   Procedure Laterality Date     APPENDECTOMY  2011     BREAST LUMPECTOMY Right 2008    DCIS tumorwas ER/HI positive, radiation follewed lumpectomy     CYST REMOVAL Right     axillary cyst drainage       Social History     Socioeconomic History     Marital status:      Spouse name: Not on file     Number of children: Not on file     Years of education: Not on file     Highest education level: Not on file   Occupational History     Not on file   Social Needs     Financial resource strain: Not on file     Food  "insecurity:     Worry: Not on file     Inability: Not on file     Transportation needs:     Medical: Not on file     Non-medical: Not on file   Tobacco Use     Smoking status: Never Smoker     Smokeless tobacco: Never Used   Substance and Sexual Activity     Alcohol use: Not Currently     Drug use: Not Currently     Sexual activity: Not on file   Lifestyle     Physical activity:     Days per week: Not on file     Minutes per session: Not on file     Stress: Not on file   Relationships     Social connections:     Talks on phone: Not on file     Gets together: Not on file     Attends Cheondo[employer] service: Not on file     Active member of club or organization: Not on file     Attends meetings of clubs or organizations: Not on file     Relationship status: Not on file     Intimate partner violence:     Fear of current or ex partner: Not on file     Emotionally abused: Not on file     Physically abused: Not on file     Forced sexual activity: Not on file   Other Topics Concern     Not on file   Social History Narrative    , 2 children             Objective:     Vitals:    09/23/19 1444   Pulse: 86   SpO2: 95%   Weight: 159 lb (72.1 kg)   Height: 5' 2.5\" (1.588 m)         Physical Exam:  Is a pleasant healthy-appearing woman in no distress.  Blood pressure 132/80.  Pulse is 66 and regular.  Oxygen saturation 97%.  EYES: Eyelids, conjunctiva, and sclera were normal. Pupils were normal. Cornea, iris, and lens were normal bilaterally.  HEAD, EARS, NOSE, MOUTH, AND THROAT: Head and face were normal. Hearing was normal to voice and the ears were normal to external exam. Nose appearance was normal and there was no discharge. Oropharynx was normal.  NECK: Neck appearance was normal. There were no neck masses and the thyroid was not enlarged.  No bruits.  RESPIRATORY: Breathing pattern was normal and the chest moved symmetrically.  Percussion/auscultatory percussion was normal.  Lung sounds were normal and there were no " abnormal sounds.  CARDIOVASCULAR: Heart rate and rhythm were normal.  S1 and S2 were normal and there were no extra sounds or murmurs. Peripheral pulses in arms and legs were normal.  Jugular venous pressure was normal.  There was no peripheral edema.  GASTROINTESTINAL: The abdomen was normal in contour.  Bowel sounds were present.  Percussion detected no organ enlargement or tenderness.  Palpation detected no tenderness, mass, or enlarged organs.   MUSCULOSKELETAL: Skeletal configuration was normal and muscle mass was normal for age. Joint appearance was overall normal.  LYMPHATIC: There were no enlarged nodes.  SKIN/HAIR/NAILS: Skin color was normal.  There were no skin lesions.  Hair and nails were normal.  NEUROLOGIC: The patient was alert and oriented to person, place, time, and circumstance. Speech was normal. Cranial nerves were normal. Motor strength was normal for age. The patient was normally coordinated.  PSYCHIATRIC:  Mood and affect were normal and the patient had normal recent and remote memory. The patient's judgment and insight were normal.    ADDITIONAL VITAL SIGNS: Oxygen saturations 96%  CHEST WALL/BREASTS: Scar above the right areola from previous biopsy-lumpectomy.  Left breast is normal to exam.  No palpable adenopathy.  RECTAL: Not checked  GENITAL/URINARY: Not checked        There are no Patient Instructions on file for this visit.    See EKG report EKG shows a normal sinus rhythm.  There are nonspecific ST-T wave changes which are unchanged.  No evidence of any acute ischemia and no arrhythmias.  Minor sinus arrhythmia noted.  Labs:  Labs pending at this time.  Results will be reviewed when available.    Immunization History   Administered Date(s) Administered     Tdap 05/11/2016     ZOSTER, LIVE 06/19/2017           Electronically signed by Vadim Whitaker MD 09/23/19 2:45 PM

## 2021-06-01 NOTE — PROGRESS NOTES
This is a 64 y.o.  female who I'm asked to see by Vadim Whitaker MD for evaluation of a left breast cancer.  This was picked up  on screening mammogram.  The patient cannot feel a mass.  A needle biopsy was done which shows an invasive ductal carcinoma.  It is estrogen receptor positive, progesterone receptor weakly positive and HER-2 negative.  She is that is post right lumpectomy with radiation about 11 years ago.  She did not have either chemotherapy or hormone therapy after that.    She has no family history of breast cancer.      PAST MEDICAL HISTORY:  Past Medical History:   Diagnosis Date     Leukopenia     mild-3100 wbc     PAC (premature atrial contraction)      Past Surgical History:   Procedure Laterality Date     APPENDECTOMY  2011     BREAST LUMPECTOMY Right 2008    DCIS tumorwas ER/OH positive, radiation follewed lumpectomy     CYST REMOVAL Right     axillary cyst drainage       Medications:    Current Outpatient Medications:      amLODIPine (NORVASC) 10 MG tablet, Take 1 tablet (10 mg total) by mouth daily., Disp: 90 tablet, Rfl: 3     aspirin 81 mg chewable tablet, Chew., Disp: , Rfl:      lisinopril (PRINIVIL,ZESTRIL) 20 MG tablet, Take 1 tablet (20 mg total) by mouth daily., Disp: 90 tablet, Rfl: 3     potassium chloride (K-DUR,KLOR-CON) 20 MEQ tablet, Take 1 tablet (20 mEq total) by mouth daily., Disp: 30 tablet, Rfl: 11     triamterene-hydrochlorothiazide (DYAZIDE) 37.5-25 mg per capsule, Take 1 capsule by mouth daily., Disp: 90 capsule, Rfl: 3    Allergies:  Allergies   Allergen Reactions     D And C Red No.40 Unknown     Fd And C No.5 (Tartrazine) Unknown     Fd And C Yellow No.6 (Sunset Yellow Fcf) Rash       Social History:   reports that she has never smoked. She has never used smokeless tobacco.    ROS:  A 12 point comprehensive review of systems was negative except as noted.    Physical Exam  /84 (Patient Site: Left Arm, Patient Position: Sitting)   Pulse 68   Resp 16   Ht 5'  "2.5\" (1.588 m)   Wt 160 lb 12.8 oz (72.9 kg)   SpO2 96%   BMI 28.94 kg/m      General:alert, appears stated age and cooperative  Lungs:clear to auscultation bilaterally  CV:regular rate and rhythm, S1, S2 normal, no murmur, click, rub or gallop  Breasts: Subtle palpable mass in the lateral aspect of the left breast.  No skin changes noted.  Well-healed scar in the right.  Minimal if any radiation changes are prevalent.  Lymph Nodes:no axillary nodes palpated  Neuro:Grossly normal  Musculoskeletal: Normal range of motion of her upper extremities.  Skin: Normal skin turgor.  Psych: Very calm manner.  Able to process information well.    Reviewed her mammograms and ultrasound and pathology.     Impression: Left Breast Cancer. Clinically T1, N0.  Discussed the surgical options for treatment of breast cancer which generally are a lumpectomy (partial mastectomy) combined with radiation versus a mastectomy.  Explained that the survival benefit is the same for both.  The difference is in local recurrence risk.  The patient is a Excellent candidate for a lumpectomy.  However, the patient states that she is already thought about it and she wants bilateral mastectomies.  I made sure she understood that she does not improve her survival by doing this.  We then talked about the options for reconstruction.  She states that she does not want reconstruction.  I made sure she understood what things look like than when she is done.  Discussed SLN biopsy.  The procedure and rationale were explained.  Discussed that at this point we do not know yet whether or not she will need chemotherapy and we may not know until we get all of the results of surgery back.      Plan: We'll schedule for a bilateral  Mastectomies with sentinel lymph node biopsy on the left.    This may be an overnight stay in the hospital.  We have to now do these at the day surgery center where she will have the option of staying the night or going home that day.  " The risks and benefits of surgery were explained.  Also talked about expected recovery time.

## 2021-06-01 NOTE — TELEPHONE ENCOUNTER
Kayleigh called, left me a voicemail that she was calling to make sure her HCD was in her chart as she is Jainism and does not want blood products, but does have other instructions.  She is also wondering how long her surgery will take.  I called her back, lmom for her that I did find her HCD in her chart.  It is in the top banner of her chart as well as scanned and labeled in Media.  I told her to call her pre op nurses attention to this on the day of her surgery.  I let her know to plan about 2.0-2.5 hours for her actual surgery, but she will be in pre op and recovery and can plan a 23 hour stay, all at the [facility].  Support provided, invited calls.

## 2021-06-01 NOTE — TELEPHONE ENCOUNTER
I called Kayleigh as I had received [facility] paperwork for her daughter, Aminah, to assist her following her recent surgery.  Kayleigh said Aminah will be helping her at home and driving her to appts.  I explained that once she meets with a medical oncologist and those recommendations are made, they may have to fill out another set of forms for Aminah. Support provided, invited calls.

## 2021-06-01 NOTE — TELEPHONE ENCOUNTER
Kayleigh called, wanted to review a few pre and post op questions.  Reviewed her questions, support provided, invited calls.

## 2021-06-01 NOTE — ANESTHESIA CARE TRANSFER NOTE
Last vitals:   Vitals:    09/25/19 1416   BP: 138/68   Pulse: (!) 57   Resp: 16   Temp: 36.1  C (97  F)   SpO2: 98%     Patient spontaneous RR, -300s, suctioned, following commands, extubated to facemask 10LPM, O2 sats 100%. VSS. Report to RN.      Patient's level of consciousness is drowsy  Spontaneous respirations: yes  Maintains airway independently: yes  Dentition unchanged: yes  Oropharynx: oropharynx clear of all foreign objects    QCDR Measures:  ASA# 20 - Surgical Safety Checklist: WHO surgical safety checklist completed prior to induction    PQRS# 430 - Adult PONV Prevention: 4558F - Pt received => 2 anti-emetic agents (different classes) preop & intraop  ASA# 8 - Peds PONV Prevention: NA - Not pediatric patient, not GA or 2 or more risk factors NOT present  PQRS# 424 - Joanne-op Temp Management: 4559F - At least one body temp DOCUMENTED => 35.5C or 95.9F within required timeframe  PQRS# 426 - PACU Transfer Protocol: - Transfer of care checklist used  ASA# 14 - Acute Post-op Pain: ASA14B - Patient did NOT experience pain >= 7 out of 10

## 2021-06-01 NOTE — TELEPHONE ENCOUNTER
Kayleigh called, she accidentally left without the breast cancer resources we had talked about.  I told her I will send them to her in the mail.  She asked about scheduling her surgery, I told her Micki is aware and will be calling her but gave her Micki's number as well.  Support and reassurance provided, invited calls.

## 2021-06-01 NOTE — TELEPHONE ENCOUNTER
"RN triage call  Patient's  is calling Leonora (consent to communicate in file)  Patient had breast surgery bilateral mastectomies 9/25/19,   On Saturday 9/28/19 she reported pain at the Right drain and she was told to \"strip\" the drain. She states that it seemed to help but today the \"pain came back with a vengeance\" and is tender. Patient states that the pain medication she has is not working.  No fever or chills  No leaking around the drain, no swelling, or redness.  Did discuss contacting Surgeon but  states they do not have a phone number to call.  Kayleigh came on the phone:  Would like to have the drain evaluated.  Did discuss her options, urgent care, ED, or WIC.  Patient stated that she would like to be seen in [place] so will go to the [facility]IC there.  Kaleigh Coker, RN  [facility] Triage Nurse  5:30 PM  10/1/2019    Reason for Disposition    [1] SEVERE post-op pain (e.g., excruciating, pain scale 8-10) AND [2] not controlled with pain medications    Protocols used: POST-OP SYMPTOMS AND [id number]      "

## 2021-06-01 NOTE — ANESTHESIA POSTPROCEDURE EVALUATION
Patient: Kayleigh Sousa  Bilateral Mastectomies with Left [place] Lymph Node Biopsy, Left axillary node dissection 23HR STAY  Anesthesia type: general    Patient location: PACU  Last vitals:   Vitals Value Taken Time   /69 9/25/2019  2:30 PM   Temp  9/25/2019  2:51 PM   Pulse 59 9/25/2019  2:35 PM   Resp 16 9/25/2019  2:30 PM   SpO2 96 % 9/25/2019  2:35 PM   Vitals shown include unvalidated device data.  Post vital signs: stable  Level of consciousness: awake and responds to simple questions  Post-anesthesia pain: pain controlled  Post-anesthesia nausea and vomiting: no  Pulmonary: unassisted, return to baseline  Cardiovascular: stable and blood pressure at baseline  Hydration: adequate  Anesthetic events: no    QCDR Measures:  ASA# 11 - Joanne-op Cardiac Arrest: ASA11B - Patient did NOT experience unanticipated cardiac arrest  ASA# 12 - Joanne-op Mortality Rate: ASA12B - Patient did NOT die  ASA# 13 - PACU Re-Intubation Rate: ASA13B - Patient did NOT require a new airway mgmt  ASA# 10 - Composite Anes Safety: ASA10A - No serious adverse event    Additional Notes:

## 2021-06-01 NOTE — ANESTHESIA PREPROCEDURE EVALUATION
Anesthesia Evaluation      Patient summary reviewed   No history of anesthetic complications     Airway   Mallampati: II  Neck ROM: full   Pulmonary - negative ROS and normal exam                          Cardiovascular - normal exam  (+) hypertension, ,      Neuro/Psych - negative ROS     Endo/Other - negative ROS      GI/Hepatic/Renal - negative ROS           Dental    (+) chipped                       Anesthesia Plan  Planned anesthetic: general endotracheal    ASA 2   Induction: intravenous   Anesthetic plan and risks discussed with: patient    Post-op plan: routine recovery           Clear

## 2021-06-02 NOTE — CONSULTS
Consults   [facility] Radiation Oncology Consult Note     Patient: Kayleigh Sousa  MRN: [id number]  Date of Service: 10/23/2019          Beba Hernandez MD  [address] [place] [address]       Dear Dr. Hernandez:    Thank you very much for referring this patient for consideration of radiotherapy. As you know Ms. Sousa is a 64 y.o. 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  The patient had a new diagnosis of her left breast cancer, stage T1c N1 M0, ER/MA positive, HER-2 negative, status post bilateral mastectomy and a left sentinel lymph node resection followed by dissection, 2/8+ lymph nodes with positive AMISH and a negative margin.  The Oncotype DX score is 9.  The patient is referred to radiation oncology for evaluation and consideration of possible radiation therapy to further reduce likelihood of cancer recurrence.    HISTORY OF PRESENT ILLNESS:   Ms. Sousa is a 64 y.o. female who had a history of right breast DCIS diagnosed initially in 2008.  The patient is status post lumpectomy and adjuvant radiation therapy at [facility].  I do not have the copy of radiation record at the time of evaluation.  The patient declined hormonal therapy and therefore has been followed closely.  She presented with a recent history of abnormal finding by routine screening mammogram for which she was a seeking further evaluation.  The mammogram followed by ultrasound showed a 1.3 x 1.0 x 1.0 cm hypoechoic lesion in the left breast 2 o'clock position 10 cm from nipple suspicious for malignancy.  Ultrasound-guided needle biopsy on 8/23/2019 confirmed diagnosis of invasive ductal carcinoma, ER/MA positive, HER-2 negative.  Patient underwent bilateral mastectomy in the left sentinel lymph node biopsy followed by lymph node dissection on 9/25/2019.  The pathology reviewed 15 x 10 x 10 mm invasive ductal carcinoma,  [place] grade 2 with associated DCIS.  The margins were negative with nearest margin measured 2.0 cm from deep and inferior margin.  2/3 sentinel lymph node showed evidence of metastatic disease with the largest metastatic deposit measured 9 x 9 mm with evidence of extranodal extension.  5 additional removed lymph node showed no evidence of metastatic disease.  Postoperatively she has been recovering well.  Her Oncotype DX score was 9 indicating low risk of recurrence.  She saw Dr. Brown, med oncology earlier and adjuvant hormonal therapy was recommended.  Patient is referred to radiation oncology for evaluation and consideration of possible postop radiation therapy.    CHEMOTHERAPY HISTORY: Concurrent Chemotherapy: No    RADIATION THERAPY HISTORY: Prior Radiation: Yes.  Prior history of radiation therapy to the right breast at [facility].    IMPLANTED CARDIAC DEVICE: none     PREGNANCY: The patient is informed not to be pregnant during the radiation therapy.  She is post menopausal.    Current Outpatient Medications   Medication Sig Dispense Refill     amLODIPine (NORVASC) 10 MG tablet Take 1 tablet (10 mg total) by mouth daily. 90 tablet 3     aspirin 81 mg chewable tablet Chew.       lisinopril (PRINIVIL,ZESTRIL) 20 MG tablet Take 1 tablet (20 mg total) by mouth daily. 90 tablet 3     potassium chloride (K-DUR,KLOR-CON) 20 MEQ tablet Take 1 tablet (20 mEq total) by mouth daily. 30 tablet 11     triamterene-hydrochlorothiazide (DYAZIDE) 37.5-25 mg per capsule Take 1 capsule by mouth daily. 90 capsule 3     No current facility-administered medications for this visit.      Past Medical History:   Diagnosis Date     Hypertension      Leukopenia     mild-3100 wbc     PAC (premature atrial contraction)      Past Surgical History:   Procedure Laterality Date     APPENDECTOMY  2011     BREAST LUMPECTOMY Right 2008    DCIS tumorwas ER/LA positive, radiation follewed lumpectomy     CYST REMOVAL Right      "axillary cyst drainage     MASTECTOMY       NM SENTINEL NODE INJECTION  9/25/2019     IN MASTECTOMY, SIMPLE, COMPLETE Bilateral 9/25/2019    Procedure: Bilateral Mastectomies with Left [place] Lymph Node Biopsy, Left axillary node dissection 23HR STAY;  Surgeon: Beba Hernandez MD;  Location: [facility];  Service: General     D and c red no.40; Fd and c no.5 (tartrazine); and Fd and c yellow no.6 (sunset yellow fcf)  Family History   Problem Relation Age of Onset     Hypertension Mother      Hypertension Father      Heart attack Father      Breast cancer Cousin      Brain cancer Cousin      Social History     Socioeconomic History     Marital status:      Spouse name: Not on file     Number of children: Not on file     Years of education: Not on file     Highest education level: Not on file   Occupational History     Not on file   Social Needs     Financial resource strain: Not on file     Food insecurity:     Worry: Not on file     Inability: Not on file     Transportation needs:     Medical: Not on file     Non-medical: Not on file   Tobacco Use     Smoking status: Never Smoker     Smokeless tobacco: Never Used   Substance and Sexual Activity     Alcohol use: Not Currently     Comment: \"rare\"     Drug use: Never     Sexual activity: Never   Lifestyle     Physical activity:     Days per week: Not on file     Minutes per session: Not on file     Stress: Not on file   Relationships     Social connections:     Talks on phone: Not on file     Gets together: Not on file     Attends Buddhism service: Not on file     Active member of club or organization: Not on file     Attends meetings of clubs or organizations: Not on file     Relationship status: Not on file     Intimate partner violence:     Fear of current or ex partner: Not on file     Emotionally abused: Not on file     Physically abused: Not on file     Forced sexual activity: Not on file   Other Topics Concern     Not on file   Social History " Narrative    , 2 children        Review of Systems:      General  General (WDL): Exceptions to WDL  Fatigue: Yes - Recent (Less than 3 months)  EENT  ENT (WDL): Exceptions to WDL  Glasses or Contacts: Yes - Chronic (Greater than 3 months)(glasses only)  Respiratory   Respiratory (WDL): All respiratory elements are within defined limits  Cardiovascular  Cardiovascular (WDL): Exceptions to WDL  Lightheadedness: Yes - Recent (Less than 3 months)  Endocrine  Endocrine (WDL): All endocrine elements are within defined limits  Gastrointestinal  Gastrointestinal (WDL): All gastrointestinal elements are within defined limits  Musculoskeletal  Musculoskeletal (WDL): All musculoskeletal elements are within defined limits  Integumentary                  Neurological  Neurological (WDL): All neurological elements are within defined limits  Dominant Hand: Right  Psychological/Emotional   Psychological/Emotional (WDL): All psychological/emotional elements are within defined limits  Hematological/Lymphatic  Hematological/Lymphatic (WDL): Exceptions to WDL  Swollen glands: Yes - Chronic (Greater than 3 months)(swelling behind left ear)  Dermatologic  Dermatologic (WDL): Exceptions to WDL(drains removed yesterday)  Healing Incision: Yes - Recent (Less than 3 months)(bilateral chestwall)  Genitourinary/Reproductive  Genitourinary/Reproductive (WDL): Exceptions to WDL  Urination more than 2 times a night: Yes - Chronic (Greater than 3 months)  Urinary Urgency: Yes - Chronic (Greater than 3 months)  Reproductive     Pain              Currently in Pain: Yes  Pain Score (Initial OR Reassessment): 1  Pain Frequency: Intermittent  Location: left breast  Pain Intervention(s): Home medication  Response to Interventions: good  Accompanied by       Imaging: Reviewed    Pathology: Reviewed    ECOG Peformance Status  ECOG Performance Status: 0  Distress Assessment Score: Extreme distress    Objective:     PHYSICAL EXAMINATION:    /87    Pulse 65   Temp 98.2  F (36.8  C) (Oral)   Wt 157 lb 6.4 oz (71.4 kg)   SpO2 98%   BMI 27.88 kg/m      Gen: Alert, in NAD  Eyes: PERRL, EOMI, sclera anicteric  HENT     Head: NC/AT     Ears: No external auricular lesions     Nose/sinus: No rhinorrhea or epistaxis     Oropharynx: MMM, no visible oral lesions  Neck: Supple, full ROM, no LAD  Chest: There are well-healed surgical scar involving left and right chest wall consistent with a recent history of surgery.  There is no palpable lump or mass bilaterally in the chest wall, axillary, and supraclavicular region.  Pulm: No wheezing, stridor or respiratory distress  CV: Well-perfused, no cyanosis, no pedal edema  Abdominal: BS+, soft, nontender, nondistended, no hepatomegaly  Back: No step-offs or pain to palpation along the thoracolumbar spine  Rectal: Deferred  : Deferred  Musculoskeletal: Normal muscle bulk and tone  Skin: Normal color and turgor  Neurologic: A/Ox3, CN II-XII intact, normal gait and station  Psychiatric: Appropriate mood and affect    Intent of Therapy: Curative  We recommend adjuvant radiation as part of her breast conserving therapy to decrease her chance of local recurrence to the single digits. ROM stretches and skin care were discussed with the patient. She understands that these maneuvers need to continue for 6 months following completion of radiation as skin and muscle fibrosis continue to form for weeks to months following completion of therapy.      Side effects that may occur during or within weeks after radiation therapy      Fatigue and general weakness    Darkening, irritation, itchiness, redness, dryness, erythema, peeling, scabbing, ulceration and contraction of the skin of the breast and chest    Swelling of the breast    Loss of armpit hair    Lung irritation    Decrease in appetite    Side effects that may occur months or years after radiation therapy      Development of another tumor or cancer    Thickening,  telangiectasias (development of spider like blood vessels in the skin) and ulceration of the skin of the breast and chest    Firming, fibrosis (scar tissue), fat necrosis, and distortion of the breast    Poor healing after a trauma or surgery in the irradiated area    Nerve damage resulting in loss of arm strength and sensation    Coronary artery blockage causing angina pain or a heart attack    Lung inflammation of fibrosis causing cough, fever and shortness of breath    Fracture of the ribs    Swellingof an arm and hand    The risks, benefits and alternatives to radiation therapy were outlined with the patient. All questions were answered and a consent was signed.     Impression     A prior history of right breast DCIS in 2008 status post lumpectomy with adjuvant radiation therapy without endocrine therapy.  The patient had a new diagnosis of her left breast cancer, stage T1c N1 M0, ER/OK positive, HER-2 negative, status post bilateral mastectomy and a left sentinel lymph node resection followed by dissection, 2/8+ lymph nodes with positive AMISH and a negative margin.  The Oncotype DX score is 9.  The patient is referred to radiation oncology for evaluation and consideration of possible radiation therapy to further reduce likelihood of cancer recurrence.    Assessment & Plan:     I have personally reviewed her upcoming medical record today.  I have also discussed with the patient about all the possible treatment options for breast cancer including surgery, systemic therapy, and radiation therapy.  The possible risks and side effects of radiation therapy has also been explained to the patient in detail and at a great lengths.  Questions are answered to patient's satisfaction.  I agree the patient is a good candidate and indicated for postop radiation therapy for her left breast cancer.  I believe the patient can be potentially benefited by radiation therapy for local control and possibly a better survival.  Patient  is also informed the potential increased risks of radiation-induced normal tissue damage given the prior history of radiation to the right chest region.  The pros and cons of different options has also been discussed with the patient.  After long discussion, the patient elected to proceed with postop radiation therapy as her treatment of choice for her left breast cancer being aware of potential risks and side effects involved.  She is scheduled to return to radiation oncology later for simulation.  I plan to give her radiation therapy to a total dose of 5040 cGy in 28 treatments targeted to the left chest wall and regional lymph nodes using a 4 field technique.    Again, thank you very much for the referral and allowing me to participate in the care of this patient.  If you have any questions or concerns about this consultation, please do not hesitate to call.  I spent approximately 60 minutes today with the patient and 80% time was used for counseling.      Sincerely,          Yanna Thompson MD, PhD  Department of Radiation Oncology   [facility]  Tel: [phone]  Page: [phone]    [facility]  [address] [id number]     [address] [place] [address]    CC:  Patient Care Team:  Vadim Whitaker MD as PCP - General (Internal Medicine)  Vadim Whitaker MD as Assigned PCP  Beba Hernandez MD as Physician (General Surgery)  Jere Brown MD as Physician (Hematology and Oncology)  Yanna Thompson MD as Physician (Radiation Oncology)  Dinora Larose, MARÍA as Oncology Nurse Navigator (Hematology and Oncology)

## 2021-06-02 NOTE — PROCEDURES
Procedures    SIMULATION NOTE:       DIAGNOSIS: A prior history of right breast DCIS in 2008 status post lumpectomy with adjuvant radiation therapy without endocrine therapy.  The patient had a new diagnosis of her left breast cancer, stage T1c N1 M0, ER/PA positive, HER-2 negative, status post bilateral mastectomy and a left sentinel lymph node resection followed by dissection, 2/8+ lymph nodes with positive AMISH and a negative margin.  The Oncotype DX score is 9.    INDICATION:  Postoperative radiation therapy is recommended for patient to further reduce the likelihood of cancer recurrence.    CONSENT:  The possible risks and the side effects of radiation therapy have been discussed with patient in detail and at great length.  The possible increased risk of radiation-induced normal tissue damage has also been explained to the patient given the prior history of radiation therapy to the right chest wall.  Questions are answered to patient's satisfaction.  Written consent was obtained.    SIMULATION:  The patient is in a supine position with a wing breast board to help keep the same position during the daily radiation therapy.  Tentative isocenter is set up in the center of the thoracic region.  We will acquire CT information to help us to better locate target and design radiation therapy field.    We are also going to obtain 4-D CT and use respiratory gating (or breath hold) technique to help us to reduce the radiation dose to the heart and lung.        BLOCKS:  Custom blocks will be drawn to minimize radiation to normal tissues and to protect normal organs including, but not limited to, lungs, heart, liver, bone and soft tissue.    DOSAGE:  I plan to give her radiation therapy at 180 cGy each fraction to a total of 5040 cGy in 28 treatments targeted to the left breast/chest wall and the regional lymph nodes using a 4 field technique.  I will consider to use 3D conformer technique to help us better locate target and  to protect normal tissue.      Yanna Thompson MD, PhD  Department of Radiation Oncology   [facility]  Tel: [phone]  Page: [phone]    [facility]  [address] [place] [address]     [address]   [place] [address]

## 2021-06-02 NOTE — PROGRESS NOTES
[facility] for [facility] post op appointment with Dr. Hernandez.  She is accompanied by her sister for consult.  She continues to have her drains in place.  She said she does have pain because of her drains upon waking in the am. She has been taking one pain pill a day for that.  Dr. Hernandez did give her a refill.  RN assessment and EMR update.  /88 (Patient Site: Left Arm, Patient Position: Sitting)   Pulse 60   Resp 16   SpO2 94% .  Patient met with Dr. Hernandez, see dictation for details.  She will plan to come in when her drains are ready to come out.  She will call me for an appointment.  Made her an appointment to see Dr. Hernandez in two weeks.      Per Dr. Hernandez's request, I called Kayleigh with her oncotype results.  RN time 15 mins

## 2021-06-02 NOTE — TELEPHONE ENCOUNTER
Called Kayleigh to schedule Med Onc and Rad Onc consults as ordered by Dr. Hernandez. Apts secured with Dr. Brown on Tuesday 10/15 and with Dr. Thompson on 10/23. Letter with appointment dates, time and location sent to Kayleigh. She has had a history of (R) Breast cancer in 2008 and had radiation a [place] [facility] with Dr. Tejada. She reports that  does not know if she had a medical oncologist that she met with. Denies having received chemo or hormone therapy in the past. (Per notes in Media, she was followed by Dr. Melissa Bardales, Med Onc, at [facility] and has also  followed with Dr. Hernandez). Radiation treatment summary from 2009 has been scanned into EPIC but will still need the [facility]I records for past radiation treatment prior to rad consult with Dr. Thompson. PETER was sent by mail with stamped/addressed envelope for easy return. She was instructed to complete and mail or fax back when she receives. New patient packet with health history and medication list also mailed for her to complete/review. She has writer's direct dial number in the event of questions or concerns prior to her apt. Contact information for writer also enclosed in her packet. Will discuss support groups in the future...Dinora Larose RN

## 2021-06-02 NOTE — PROGRESS NOTES
OFFICE VISIT NOTE    Subjective:   Chief Complaint:  Dysuria    64-year-old woman in with a complaint of having some urinary dribbling at the end of normal voidings.  No fever chills hematuria or dysuria.  No flank tenderness.  A second concern is the fact that she has had breast cancer.  She is concerned that she is taking the medication lisinopril which is an ACE inhibitor.  She has read somewhere that ACE inhibitors are associated with increased in breast cancer recurrence.  She would like to make a switch if possible.    Current Outpatient Medications   Medication Sig     amLODIPine (NORVASC) 10 MG tablet Take 1 tablet (10 mg total) by mouth daily.     aspirin 81 mg chewable tablet Chew.     lisinopril (PRINIVIL,ZESTRIL) 20 MG tablet Take 1 tablet (20 mg total) by mouth daily.     potassium chloride (K-DUR,KLOR-CON) 20 MEQ tablet Take 1 tablet (20 mEq total) by mouth daily.     triamterene-hydrochlorothiazide (DYAZIDE) 37.5-25 mg per capsule Take 1 capsule by mouth daily.     olmesartan (BENICAR) 40 MG tablet Take 1 tablet (40 mg total) by mouth daily.       Review of Systems:  A comprehensive review of systems is negative except for the comments above    Objective:    There were no vitals taken for this visit.  GENERAL: No acute distress.  The urine analysis today looks normal.  There is no bladder distention.  No suprapubic tenderness.  No flank tenderness.  Blood pressure today is 128/84.    Assessment & Plan   Kayleigh Sousa is a 64 y.o. female.    No evidence of urinary tract infection.  Indicated.  Discontinue lisinopril.  Try Benicar 40 mg p.o. daily for her blood pressure.  She will also continue taking amlodipine 10 mg daily.    Diagnoses and all orders for this visit:    Dysuria  -     Urinalysis-UC if Indicated    Essential hypertension  -     olmesartan (BENICAR) 40 MG tablet; Take 1 tablet (40 mg total) by mouth daily.  Dispense: 90 tablet; Refill: 3        Vadim Whitaker MD  Transcription using  voice recognition software, may contain typographical errors.

## 2021-06-02 NOTE — PROGRESS NOTES
In for follow-up of her bilateral mastectomies  with sentinel lymph node biopsy and then additional node sampling on the left.  She is feeling well.  She is having very minimal pain.      Physical exam:  Appears well.  Does not appear in any discomfort.  Breasts: Incisions are healing nicely with no signs of infection.  No swelling.  DANIELLE drains are putting out just a little too much to be pulled today.    Pathology: The tumor was 1.5 cm.  The margins are negative.  Her sentinel lymph node is positive.  2 out of 3 sentinel nodes were positive.  5 additional lymph nodes were negative.    Impression: Postop visit. Doing well.  Reviewed her final pathology.  Because only 2 lymph nodes were involved, I do see value in doing an Oncotype and that is been ordered.  I think she may benefit from radiation also.  Discussed the different options for oncologist and she would like to be seen at [facility].    Plan:  We'll refer her onto [facility] oncology.  Follow-up with me in 2 weeks.  She will also call Rosie when her drains are putting out less than 20 cc a day and she will come in to have those removed.

## 2021-06-02 NOTE — PROCEDURES
Procedures  Clinical Treatment Planning Note    The complex radiotherapy planning will be completed for the patient to plan the treatment for her breast cancer.  The patient had a planning CT earlier today for planning.  The treatment aids were used for planning, including headrest and Wing Board to help keep the same position during the daily radiation therapy.  The therapy planning is necessary to reduce radiotherapy dose to the normal critical organs which are not possible with simple treatment.  In addition, dose to the target and the critical structures requires three-dimensional analysis of the isodose distribution.  The planning will be done to reduce dose to the lungs, spinal cord, liver, and heart.     I will contour the clinical tumor volume  CTV , with expanded volume of planning treatment volume  PTV  on the treatment planning system.  The critical structures will be outlined, including spinal cord, lungs, heart, liver, bone and soft tissue.     Treatment planning will be done on the computer treatment planning system.  The 4-field will be used to achieve optimal coverage of the target volume.  Dose distribution to the above critical structures will be reviewed.  Isodose distribution along with the X, Y, Z plan will also be reviewed.  Custom blocking will be used to shield normal structures.  The beam s eye views will be reviewed and the digital reconstructed image will be reviewed on the planning software.      The patient will receive 5040 cGy in 28 treatments targeted to the left chest/breast and regional lymph nodes using 6-MV or 10-MV photons.          Yanna Thompson MD, PhD  Department of Radiation Oncology   [facility]  Tel: [phone]  Page: [phone]    [facility]  [address] [place] [address]     [address] [facility] [address] [place] [address]

## 2021-06-02 NOTE — PROGRESS NOTES
[facility] post op visit with Dr. Hernandez.  Her drains have been putting out very little. RN assessment and EMR update.  She met with Dr. Hernandez, see dictation for details.  Drains removed by Dr. Hernandez.  She will plan to come see Dr. Hernandez in 6 mos.  RN time 8 mins

## 2021-06-02 NOTE — PROGRESS NOTES
Kayleigh comes in for continued follow-up of her bilateral mastectomies.  She is overall feeling well.  Over the last few days the amount of drainage coming out her DANIELLE drains has fallen to almost nothing.    Physical exam:  Looks well.  Both mastectomy sites look good.  No signs of infection.  I pulled both drains today.    Impression: Status post bilateral mastectomies for a left breast cancer.  She also has a history of right breast cancer many years ago.  She has visited with the oncologist and they agree that she just needs hormone therapy.  She has an ointment with radiation oncology tomorrow.  I definitely think she would benefit from radiation.    Plan: Follow-up with me in 6 months.  I have put through a referral to [facility] for compression sleeve for her left arm and for mastectomy bra and prostheses.

## 2021-06-02 NOTE — TELEPHONE ENCOUNTER
Called Kayleigh and ROSEY to follow up after her radiation oncology apt as writer missed her in clinic. ROSEY for her to call writer back at direct dial number.

## 2021-06-02 NOTE — CONSULTS
[facility] Hematology and Oncology Consult Note    Patient: Kayleigh Sousa  MRN: [id number]  Date of Service: 10/15/2019      Reason for Visit    Chief Complaint   Patient presents with     HE Cancer     Breast Cancer       Assessment/Plan    ECOG Performance   ECOG Performance Status: 0  Distress Assessment  Distress Assessment Score: Unable to rate(visit today-what to expect)    #. pT1c pN1a cM0 invasive ductal carcinoma of the left breast, grade 2, 15 mm.  2 out of 3 sentinel lymph nodes were positive for metastatic carcinoma with extracapsular extension, and additional 5 lymph nodes were negative for malignancy.  Margins were negative.  Oncotype DX recurrence score 9 with distant recurrence risk at 9-year with endocrine therapy is 12%, no apparent chemotherapy benefit.      Reviewed the pathology results in detail.  She completed surgery.  I discussed about adjuvant treatment to decrease her breast cancer recurrence.  I offered her to complete the PET scan for complete staging and I informed her that if the PET scan results is negative, her breast cancer is in stage I disease.  She is very worried about renal injury with CT scan and contrast exposure as she had prior history of renal injury from that.   She is going to meet with radiation oncology next week to discuss about risks and benefits of adjuvant radiation.  I discussed with her that she has some high risk features of breast cancer recurrence being positive lymph node status with evidence of external capsular extension.  However, Oncotype DX recurrence score showed that adjuvant chemotherapy will not add additional benefit.  Therefore, I would not recommend chemotherapy and she is not interested in chemotherapy either.  I discussed about adjuvant endocrine therapy.  She is postmenopausal age that I recommend aromatase inhibitor for minimum of 5 years treatment.  She will need a bone density scan is at baseline as well.   I will plan to see her after  she meet with radiation oncology/finishing adjuvant radiation.    #.  History of ER positive right breast DCIS in 2008 post lumpectomy and radiation without endocrine therapy.    Problem List    1. Malignant neoplasm of upper-outer quadrant of left breast in female, estrogen receptor positive (H)  NM PET CT Skull to Mid Thigh     ______________________________________________________________________________    Staging History    Cancer Staging  Cancer of left breast (H)  Staging form: Breast, AJCC 8th Edition  - Clinical: No stage assigned - Unsigned  - Pathologic: Stage IA (pT1c, pN1a, cM0, G2, ER+, WI+, HER2-) - Unsigned    History    Mrs. Kayleigh Sousa is a very pleasant 64-year-old female accompanied by her daughter, Marietta.    She was recently diagnosed with left breast invasive ductal carcinoma by a screening mammogram.  She underwent left breast mastectomy, right breast simple mastectomy, left axillary lymph node dissection on 9/25/2019.  She still has drain and hoping to get removed soon.  Denies any discomfort.  She denies any.  Patient has prior history of right breast DCIS in 2008 and she was treated with right breast lumpectomy followed by radiation at [place] [facility].  She decided against endocrine therapy.    She is .  They have 2 children.  Never smoker.  She rarely drinks alcohol.  She used to work in customer service and retire about 4 months ago.    Paternal cousin had breast cancer at unknown age and another paternal cousin had brain tumor.  Details were unclear.    Past History    Past Medical History:   Diagnosis Date     Hypertension      Leukopenia     mild-3100 wbc     PAC (premature atrial contraction)     Family History   Problem Relation Age of Onset     Hypertension Mother      Hypertension Father      Heart attack Father      Breast cancer Cousin      Brain cancer Cousin       Past Surgical History:   Procedure Laterality Date     APPENDECTOMY  2011     BREAST LUMPECTOMY Right  "2008    DCIS tumorwas ER/HI positive, radiation follewed lumpectomy     CYST REMOVAL Right     axillary cyst drainage     MASTECTOMY       NM SENTINEL NODE INJECTION  9/25/2019     HI MASTECTOMY, SIMPLE, COMPLETE Bilateral 9/25/2019    Procedure: Bilateral Mastectomies with Left [place] Lymph Node Biopsy, Left axillary node dissection 23HR STAY;  Surgeon: Beba Hernandez MD;  Location: [facility];  Service: General    Social History     Socioeconomic History     Marital status:      Spouse name: Not on file     Number of children: Not on file     Years of education: Not on file     Highest education level: Not on file   Occupational History     Not on file   Social Needs     Financial resource strain: Not on file     Food insecurity:     Worry: Not on file     Inability: Not on file     Transportation needs:     Medical: Not on file     Non-medical: Not on file   Tobacco Use     Smoking status: Never Smoker     Smokeless tobacco: Never Used   Substance and Sexual Activity     Alcohol use: Not Currently     Comment: \"rare\"     Drug use: Never     Sexual activity: Never   Lifestyle     Physical activity:     Days per week: Not on file     Minutes per session: Not on file     Stress: Not on file   Relationships     Social connections:     Talks on phone: Not on file     Gets together: Not on file     Attends Hinduism service: Not on file     Active member of club or organization: Not on file     Attends meetings of clubs or organizations: Not on file     Relationship status: Not on file     Intimate partner violence:     Fear of current or ex partner: Not on file     Emotionally abused: Not on file     Physically abused: Not on file     Forced sexual activity: Not on file   Other Topics Concern     Not on file   Social History Narrative    , 2 children        Allergies    Allergies   Allergen Reactions     D And C Red No.40 Unknown     Fd And C No.5 (Tartrazine) Unknown     Fd And C Yellow No.6 " (Sunset Yellow Fcf) Rash       Review of Systems    General  General (WDL): Exceptions to WDL  Fatigue: Yes - Recent (Less than 3 months)  Fever: None  Generalized Muscle Weakness: None  Weight Loss: No  ENT  ENT (WDL): Exceptions to WDL  Changes in vision: None  Glasses or Contacts: Yes - Chronic (Greater than 3 months)  Hearing loss: None  Hearing Aids: None  Tinnitus: None  Pain/Pressure in ears: None  Sinus Congestion/Drainage: None  Hoarseness: None  Sore Throat: None  Dental Problems: Yes - Recent (Less than 3 months)  Dentures: None  Respiratory  Respiratory (WDL): All respiratory elements are within defined limits  Cardiovascular  Cardiovascular (WDL): Exceptions to WDL  Palpitations: Yes - Recent (Less than 3 months)  Edema Limbs: None  Irregular Heart Beat: Yes - Recent (Less than 3 months)  Chest Pain: None  Lightheadedness: Yes - Recent (Less than 3 months)  Endocrine  Endocrine (WDL): All endocrine elements are within defined limits  Gastrointestinal  Gastrointestinal (WDL): All gastrointestinal elements are within defined limits  Musculoskeletal  Musculoskeletal (WDL): All musculoskeletal elements are within defined limits  Neurological  Neurological (WDL): All neurological elements are within defined limits  Dominant Hand: Right  Psychological/Emotional  Psychological/Emotional (WDL): All psychological/emotional elements are within defined limits  Hematological/Lymphatic  Hematological/Lymphatic (WDL): Exceptions to WDL  Bleeding gums: None  Bruising: None  Epistaxis: None  Swollen glands: Yes - Chronic (Greater than 3 months)(swelling behind left ear)  Dermatological  Dermatologic (WDL): Exceptions to WDL(drains bilat breasts)  Open wounds: None  Rash : None  Hair Loss: None  Healing Incision: Yes - Recent (Less than 3 months)  Changes in moles: None  Significant sunburn: None  Genitourinary/Reproductive  Genitourinary/Reproductive (WDL): Exceptions to WDL  Urinary Frequency: None  Urinary  Incontinence: None  Painful urination: None  Urination more than 2 times a night: Yes - Chronic (Greater than 3 months)  Urinary Urgency: Yes - Chronic (Greater than 3 months)  Difficulty Initiating Urine Stream: None  Blood in urine: None  Sensation of incomplete emptying of bladder: None  Reproductive (Females only)     Pain  Currently in Pain: Yes  Pain Score (Initial OR Reassessment): 1  Pain Frequency: Intermittent  Location: left breast(nipple area)  Pain Characteristics : Sharp;Stabbing(sharp pricks )  Pain Intervention(s): Home medication  Response to Interventions: improved    Physical Exam    Recent Vitals 10/17/2019   Height -   Weight 157 lbs   BSA (m2) 1.78 m2   BP -   Pulse -   Temp -   Temp src -   SpO2 -   Some recent data might be hidden     General: alert, awake, not in acute distress  HEENT: Head: Normal, normocephalic, atraumatic.  Eye: Normal external eye, conjunctiva, lids cornea, JAN.  Ears:  Non-tender.  Nose: Normal external nose, mucus membranes and septum.  Pharynx: Dental Hygiene adequate. Normal buccal mucosa. Normal pharynx.  Neck / Thyroid: Supple, no masses, nodes, nodules or enlargement.  Lymphatics: No abnormally enlarged lymph nodes.  Chest: Normal chest wall and respirations. Clear to auscultation.  Breasts: not completed today due to discomfort  Heart: S1 S2 RRR, no murmur.   Abdomen: abdomen is soft without significant tenderness, masses, organomegaly or guarding  Extremities: normal strength, tone, and muscle mass  Skin: normal. no rash or abnormalities  CNS: non focal.    Lab Results    Recent Results (from the past 168 hour(s))   POCT Glucose   Result Value Ref Range    Glucose 92 70 - 139 mg/dL       Imaging Results    Nm Pet Ct Skull To Mid Thigh    Result Date: 10/17/2019  EXAM: NM PET CT SKULL TO MID THIGH LOCATION: [facility] DATE/TIME: 10/17/2019 3:06 PM INDICATION: Initial treatment planning and staging for malignant neoplasm of upper outer quadrant of left  breast in female, estrogen receptor positive. COMPARISON: Mammogram dated 08/23/2019 with left breast ultrasound and biopsy dated 08/23/2019 TECHNIQUE: Serum glucose level 92 mg/dL. One hour post intravenous administration of 8.2 mCi F-18 FDG, PET imaging was performed from the skull base to the mid thighs utilizing attenuation correction with concurrent axial CT and PET/CT image fusion. Dose  reduction techniques were used. FINDINGS: FDG uptake in the bilateral breast tissue with in situ drainage catheters likely related to recent mastectomies. The remaining FDG uptake is physiologic from the skull base to mid thigh. Scarring in the lung bases. Sigmoid diverticulosis. Pelvic phleboliths.     No metabolic evidence of active neoplasm.    [place] Lymph Node Injection    Result Date: 9/25/2019  EXAM: NM SENTINEL NODE INJECTION LOCATION: [facility] DATE/TIME: 9/25/2019 9:57 AM INDICATION: [place] lymph node localization. Malignant neoplasm of upper-outer quadrant of left female breast. COMPARISON: Outside breast imaging dated 8/23/2019 TECHNIQUE: The skin was prepped. 534 uCi of Tc-99m Lymphoseek were injected intradermally into the periareolar tissue. The patient tolerated this well.     CONCLUSION: Left breast sentinel lymph node injection.        Signed by: Jere Brown MD

## 2021-06-02 NOTE — PROGRESS NOTES
Met with Kayleigh when she came to clinic for consult with Medical Oncologist, Dr. Brown. We had spoken on the phone in the past when scheduling her apts. She did get writer's letter explainiing the role of the navigator. We had planned to discuss support information at her apt today. She does not feel that a support group is a good fit for her as she does not like to share in a larger group. Information was given on [employer] and program explained. She is interested in the program and knows how to contact service. Also offered option of meeting with Linda Waller and she is interested and considering as an option. Writer will help facilitate an apt if she decides to schedule. Kayleigh's daughter, Marietta was with her at her apt today and is a good support to her and drive her to her apt today. Per Kayleigh, she will be scheduled for a PET scan (scheduled on 10/17) and has a Radiation Consult with Dr. Thompson on 10/23. She will meet with Dr. Brown in the future to review PET and discuss hormone therapy/plan of care. Kayleigh has writer's number for future correspondence...Dinora Larose RN

## 2021-06-02 NOTE — PROGRESS NOTES
Patient here ambulatory accompanied by  for radiation consult for her leftt breast cancer.  Patient had previous radiation to the right breast in 2008.  Per patient drains removed yesterday and she is still a little tender from that.  20 minutes spent in review of radiation process and potential side effects.  Written information given for review: ACS RT book, RT to breast handout, Guillermina RT handout, doctor bio card, insurance PA handout, HE class schedule, dietician handout, team photo sheet and cancer care welcome letter.  Seen by Dr. Thompson. Plan RTC for follow up and/or CT simulation as directed by physician.

## 2021-06-03 VITALS
OXYGEN SATURATION: 98 % | TEMPERATURE: 97.7 F | BODY MASS INDEX: 27.72 KG/M2 | DIASTOLIC BLOOD PRESSURE: 90 MMHG | HEART RATE: 71 BPM | WEIGHT: 156.5 LBS | SYSTOLIC BLOOD PRESSURE: 144 MMHG

## 2021-06-03 VITALS — BODY MASS INDEX: 29.06 KG/M2 | WEIGHT: 164 LBS | HEIGHT: 63 IN

## 2021-06-03 VITALS — HEIGHT: 63 IN | WEIGHT: 160 LBS | BODY MASS INDEX: 28.35 KG/M2

## 2021-06-03 VITALS — BODY MASS INDEX: 27.81 KG/M2 | WEIGHT: 157 LBS

## 2021-06-03 VITALS
SYSTOLIC BLOOD PRESSURE: 128 MMHG | DIASTOLIC BLOOD PRESSURE: 84 MMHG | BODY MASS INDEX: 27.64 KG/M2 | WEIGHT: 156 LBS | HEIGHT: 63 IN

## 2021-06-03 VITALS — HEIGHT: 63 IN | BODY MASS INDEX: 28.49 KG/M2 | WEIGHT: 160.8 LBS

## 2021-06-03 VITALS
HEIGHT: 63 IN | BODY MASS INDEX: 28.17 KG/M2 | HEART RATE: 86 BPM | SYSTOLIC BLOOD PRESSURE: 132 MMHG | WEIGHT: 159 LBS | OXYGEN SATURATION: 95 % | DIASTOLIC BLOOD PRESSURE: 80 MMHG

## 2021-06-03 VITALS
WEIGHT: 160 LBS | OXYGEN SATURATION: 96 % | DIASTOLIC BLOOD PRESSURE: 83 MMHG | HEIGHT: 63 IN | TEMPERATURE: 98.1 F | HEART RATE: 68 BPM | BODY MASS INDEX: 28.35 KG/M2 | SYSTOLIC BLOOD PRESSURE: 159 MMHG

## 2021-06-03 VITALS
BODY MASS INDEX: 27.88 KG/M2 | OXYGEN SATURATION: 98 % | DIASTOLIC BLOOD PRESSURE: 87 MMHG | HEART RATE: 65 BPM | SYSTOLIC BLOOD PRESSURE: 163 MMHG | WEIGHT: 157.4 LBS | TEMPERATURE: 98.2 F

## 2021-06-03 NOTE — PROGRESS NOTES
S: Patient was seen in [place] to be measured for a left compression arm sleeve 20-30 mmHg. Rx is on-file from Dr. Hernandez.    O: Goal is to evaluate and measure patient for a compression garment that will help prevent any swelling from occurring in her left arm now that she is starting radiation therapy today. This is to be used as a preventative.    A: I took measurements for a Medi Singer arm sleeve 20-30 mmHg. I pulled this garment for her from the [employer] stock supply (size 3 in sand). I assisted Kayleigh in donning her compression garment. Once donned, the garment appeared to be fitting well and she stated it was comfortable. She was instructed on the donning and doffing process, the uses of the garment, and how to care for the item. She went home with one arm sleeve for now. She will call to order more after washing and wearing for a few days to confirm the fit. I discussed with her the thirty day exchange policy.    P: She is to call with any further needs.  Goal is to maintain a home program.

## 2021-06-03 NOTE — TELEPHONE ENCOUNTER
Medication Question or Clarification  Who is calling: Patient  What medication are you calling about? (include dose and sig) olmesartan (BENICAR) 40 MG daily.   Who prescribed the medication?: Vadim Whitaker MD  What is your question/concern?: Patient states this medication has not been working well for her blood pressure control. She also notes she had previously been on this medication and it did not work well at that time either (patient was unaware that olmesartan was the same as Benicar). She states multiple family members are using propanolol which works well for them and would like to possibly switch to that medication or class of medications. Below are some of her more recent blood pressure readings:    11/15/2019 173/105  11/18/2019 160/80  11/20/2019 147/94  11/24/2019 161/108, 166/89, 163/91  11/26/2019 167/92    Pharmacy: [id number]  Okay to leave a detailed message?: Yes  Site CMT - Please call the pharmacy to obtain any additional needed information.

## 2021-06-03 NOTE — PROGRESS NOTES
Met with Kayleigh when she came to clinic for radiation treatment. She reports that she has been fatigued, has been in the midst of adjusting her BP medication as she had been hypertensive. She met with prosthetics and will return tomorrow for fitting. She is looking forward to having her prosthetics but has appreciated the knitted knockers in the interim. Radiation going well. She has writer's contact information for future correspondence.

## 2021-06-03 NOTE — TELEPHONE ENCOUNTER
switch her to metoprolol  mg p.o. daily.  This is a beta-blocker, in the same category as propranolol.  He can be taken once a day.  She can stop taking the olmesartan.  Will call in the prescription for metoprolol.

## 2021-06-03 NOTE — TELEPHONE ENCOUNTER
Kayleigh called on 11/14/19 and left a VM asking to set up an appnt for post-mastectomy supplies. I called her back and left a VM providing her the scheduling line she can call to set up an evaluation.

## 2021-06-03 NOTE — TELEPHONE ENCOUNTER
Dr. Brown, please review and advise.  Patient is currently getting radiation until 12/24 to 12/26.  She is to see you in follow-up after completing radiation to discuss endocrine therapy. Was to get DEXA scan before starting treatment.   Patient hesitant to take AI due to risk of increased B/P as she has hx of same.   Reviewed Oncotype score and information that was provided to her at consult with you      Patient main concern physically and mentally is to get as much completed this calendar year due to fiances (has met all deductibles).    She is also wondering if she should have Vitamin D level checked.   6/25/18 0807       Vitamin D, Total (25-Hydroxy) 30.0 - 80.0 ng/mL 34.4   Advised typically taking OTC Vitamin D3 2000 IU is adequate.  Patient also wondering about WBC:  9/23/19 1521       WBC 4.0 - 11.0 thou/uL 3.5Low    Advised we typically monitor level at appt.  Mariposa Coley RN

## 2021-06-03 NOTE — PROGRESS NOTES
[facility] Daily Progress Note  Patient Name: Kayleigh Sousa  Date of evaluation: 11/15/2019  Today's Date: 11/20/2019   Visit Number: 2/12  Insurance:[email or web address] - no group therapy  Referral Diagnosis: Malignant neoplasm of upper-outer quadrant of left breast in female, estrogen receptor positive (H)  Referring provider: Yanna Thompson MD  Visit Diagnosis:     ICD-10-CM    1. Decreased range of motion of shoulder, left M25.612    2. Lymphedema I89.0        Assessment:      Patient tolerated session well today. Explained the purpose of keeping up with PT during radiation, but will see how her skin is doing and if it's too much to do 2 times per week, will back appointments down, then increase if needed. Patient ok with this plan.     She continues to have significantadherance at bilat mastectomy incisions.    Goals:   Pt. will demonstrate/verbalize independence in self-management of condition in : 12 weeks  Patient will reach / maintain arm movement: overhead;for dressing;with less pain;with less difficulty;in 6 weeks    Patient will have decreased fibrosis, scar tissue for improved lymphatic mobilitiy : in 6 weeks  Pt will: decrease score on SPADI by 8 points to demonstrate improved function of shoulders and decreased pain       Plan / Patient Instructions:      Plan for next visit:  Continue with MFR if skin is tolerating well.   scar massage but caution with L with current radatiation, (may need to decreased intensity/discontinue as effects of radiation progresses).     Continue instruction in posture and strengthening and MLD instruction in self lymphatic drainage to address L UE/UQ lymphedema avoiding clearing to R axilla as with h/o LND on R per patient report.      Continue to monitor and assist patient with proper garment options as may benefit from glove/gauntlet on L in addition to L sleeve. And may consider compression tank/erna at future date once effects of radiation resolved.    "  Subjective:         Patient's daughter present during session.    Started radiation 2019 (28 total)    Pain Ratin/10; across chest and L armpit    Functional limitations are described as occurring with:   lifting  reaching overhead and behind back  rolling to either side; dressing tighter shirts         Objective:       Skin: no radiation effects at this time. Incisions bilateral mastectomies, significant adherance.    ROM: seated shoulder flexion; R= 160; L=150; axillary cording L>R    Treatment Today  2019   TREATMENT MINUTES COMMENTS   Evaluation     Self-care/ Home management     Manual therapy 20 MFR to bilat pec, manual stretching bilat UEs.   (Continued education on  gentle massage of mastectomy scars 2x/day and in likely need to discontinue on L by week 3 of radiation but ok to continue on R)   Neuromuscular Re-education  Initiated instruction in proper Posture and in increased body awareness thro day including minibreak conceptevery hour and issued wriiten instructions including diaphragmatic breathing to engage parasympathetic nervous system, roll shoulders back and down, relax exram mm tension, check posture and engage core especially midlle/lower traps.   Therapeutic Activity     Therapeutic Exercises 25 Reviewed the following and instruction on purpose and answered patient's/daughter's questions on exercises.   Supine and standing B shoulder flexion with LTR in supine and UE supported on pillow and breathe into tightness holding 20\", hands behind head pressing elbows back x 5.  Instructed in B wall slides and wall angels with gentle end range stretch x 5 each.; scapular retraction. Also educated iin safely progressing walking program   Gait training     Modality__________________                Total 45    Blank areas are intentional and mean the treatment did not include these items.              Raquel Ledezma, PT, CLT  2019                 "

## 2021-06-03 NOTE — PROGRESS NOTES
[facility] RN site check for spit stitches X 3.  She is currently undergoing radiation and was told to come have these removed if possible.  Her skin and incision  is well healed, no redness. X 3 white suture knots removed without disrupting skin integrity.  One medial stitch remains at the skin, but unable to remove the whole knot.  Patient will watch it and if it sticks out further in the next week, will attempt to remove it at home.  Told her she is welcome to call me and we can try again next week.  Support provided, invited calls.  RN time 15 mins

## 2021-06-03 NOTE — PROGRESS NOTES
[facility]   Breast Rehab and Wellness Initial Evaluation      Patient Name: Kayleigh Sousa  Date of evaluation: 11/15/2019  Insurance:[employer] - no group therapy  Referral Diagnosis: Malignant neoplasm of upper-outer quadrant of left breast in female, estrogen receptor positive (H)  Referring provider: Yanna Thompson MD  Visit Diagnosis:     ICD-10-CM    1. Decreased range of motion of shoulder, left M25.612    2. Lymphedema I89.0        Assessment:      Pt. would be a good candidate for edema management and to develop a home management program. and to address B axillary cording/ shoulder dysfunction    Goals:   Pt. will demonstrate/verbalize independence in self-management of condition in : 12 weeks  Patient will reach / maintain arm movement: overhead;for dressing;with less pain;with less difficulty;in 6 weeks    Patient will have decreased fibrosis, scar tissue for improved lymphatic mobilitiy : in 6 weeks  Pt will: decrease score on SPADI by 8 points to demonstrate improved function of shoulders and decreased pain      Patient's expectations/goals are realistic.    Barriers to Learning or Achieving Goals:  No Barriers.    Lymphedema Category:  VI - Stage 2 with Mod-High Functional Demand   L>R axillary cording       Plan / Patient Instructions:      Plan of Care:   Authorization / Certification Start Date: 11/15/19  Authorization / Certification End Date: 02/13/20  Authorization / Certification Number of Visits: up to 12 visits  Communication with: Referral Source;Patient Caregiver  Patient Related Instruction: Nature of Condition;Treatment plan and rationale;Self Care instruction;Basis of treatment;Body mechanics;Posture;Precautions;Next steps;Expected outcome  Times per Week: 2x/week  Number of Weeks: 4 weeks then decrease to 2x/month x 2 months  Number of Visits: up to 12  Discharge Planning: discharge to home program  Precautions / Restrictions : concurrent radiation treatment  Therapeutic  Exercise: Stretching;Strengthening;Lymphedema  Neuromuscular Reeducation: kinesio tape;posture;core  Manual Therapy: soft tissue mobilization;lymphatic drainage massage;myofascial release  Functional Training (ADL's): skin care;self care;lymphedema precautions;ADL's      Plan for next visit: Consider entering in volume measurements below as Epic volume unavailable 11/15/2019.  progress with gentle myofacial release and stretching of shoulders and chest wall and to address L>R axillary cording.  Instruct patient in gentle MFR for cording.  Progress with scar massage but caution with L with current radatiation, will likely need to decreased intensity/discontinue as effects of radiation progresses. Continue instruction in posture and strengthening and MLD instruction in self lymphatic drainage to address L UE/UQ lymphedema avoiding clearing to R axilla as with h/o LND on R per patient report.  Continue to monitor and assist patient with proper garment options as may benefit from glove/gauntlet on L in addition to L sleeve. And may consider compression tank/erna at future date once effects of radiation resolved.     Subjective:           Social information:   Living Situation:single family home with    Occupation:retired   Equipment Available: None    History of Present Illness:  Reports of current feeling of chest and L shoulder tightness.  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  HTN, Leukopenia  Previously ambulated about 2x/week at least a mile.  Limited activity since surgery.    Pain Ratin/10; across chest and L armpit    SPADI: pain = 32/50; disability = 32/80; total = 64/130 = 49%    Functional limitations are described as occurring with:   lifting  reaching overhead and behind back  rolling to either side; dressing tighter shirts         Objective:      Patient Outcome  Measures :    Shoulder Pain and Disability Index (SPADI) in %: 49     Scores range from 0-100%, where a score of 0% represents minimal pain and maximal function. The minimal clincically important difference is a score reduction of 10%.      Examination  1. Decreased range of motion of shoulder, left     2. Lymphedema       Involved side: L>R; B mastecomies with L LND; starting radiation  Posture Observation:  Cervical protraction; B shoulder IR  Surgery: B mastectomies with L LND +2/8 9/25/2019; h/o R LND but unsure number in 2008  Treatments: received compression sleeve Mediven [place], size III, class 1  Precautions/Restrictions: none  Involved area: L / R UE/UQ  Edema: mild L UE/ UQ and hand  nonpitting edema and L UQFibrosis:  Sensation: decreased localized to scars  Skin/scars: signficant adherance of B mastectomy scars  ROM: seated shoulder flexion; R= 160; L=150; axillary cording L>R  Strength; decreased core engagement  Gait: no dysfunction noted ambulating 100'  stemmer's sign = negative    Volume: Epic volume equation unaccessible  Base of cuticle 3rd finger to smallest wrist = 17cm  Smallest wrist (SW):  R= 16.4cm; L=16.4cm  +8cm from SW:          R=19.8cm; L=19.4cm  +16cm from sW;         R=26.2cm; L=24.8cm  +24cm from SW:         R=26.7cm; L=26.2cm  +32 cm from SW         R=28.4cm;  L=29.3cm  +40cm from SW          R=30.5cm;  L=31cm      Treatment Today   11/15/2019  TREATMENT MINUTES COMMENTS   Evaluation 20    Self-care/ Home management 10 Assessed current compression sleeve and noted good fit.  Initiated education on possible benefit of considering use of glove/gauntet for use with compression sleeve and noting mild puffy edema extending into L hand//fingers as well.     Manual therapy 5 Initiated education in gentle  massage of mastectomy scars 2x/day and in likely need to discontinue on L by week 3 of radiation but ok to continue on R.   Neuromuscular Re-education 10 Initiated instruction in proper  "Posture and in increased body awareness throghout day including minibreak conceptevery hour and issued wriiten instructions including diaphragmatic breathing to engage parasympathetic nervous system, roll shoulders back and down, relax exram mm tension, check posture and engage core especially midlle/lower traps.   Therapeutic Activity     Therapeutic Exercises 15 Supine and standing B shoulder flexion with LTR in supine and UE supported on pillow and breathe into tightness holding 20\", hands behind head pressing elbows back x 5.  Instructed in B wall slides and wall angels with gentle end range stretch x 5 each.; scapular retraction. Also educated iin safely progressing walking program   Gait training     Modality__________________                Total 60    Blank areas are intentional and mean the treatment did not include these items.     PT Evaluation Code: (Please list factors)  Patient History/Comorbidities: daily radiation treatment, pain  Examination: decreased ROM, axillary cording, impaired posture and strength, edema  Clinical Presentation: stable  Clinical Decision Making: low complexity    Patient History/  Comorbidities Examination  (body structures and functions, activity limitations, and/or participation restrictions) Clinical Presentation Clinical Decision Making (Complexity)   No documented Comorbidities or personal factors 1-2 Elements Stable and/or uncomplicated Low   1-2 documented comorbidities or personal factor 3 Elements Evolving clinical presentation with changing characteristics Moderate   3-4 documented comorbidities or personal factors 4 or more Unstable and unpredictable High              MARLON Reyes, NAHUN-MIRIAN  11/15/2019  11:44 AM               "

## 2021-06-03 NOTE — TELEPHONE ENCOUNTER
Left detailed message for the patient relaying message below from Dr. Whitaker.  Asked that she call with any further questions.  Kirsty FLORIAN CMA/FAINA....................12:53 PM

## 2021-06-03 NOTE — PROGRESS NOTES
S: I have received Kayleigh's three additional Medi [place] arm sleeves 20-30 mmHg (size 3). A modified RX from Dr. Hernandez has been requested for three additional sleeves. Patient was seen in [place] to  these sleeves, as well as to be measured for mastectomy bras and bilateral breast forms. I requested a modified RX from Dr. Hernandez as well for these products in the specific quantities that insurance will cover.    O: Goal is to evaluate and measure patient for a mastectomy garment that will provide her comfort and support. In addition, we will be getting her two breast forms to provide shape and appear natural. Kayleigh used to be in a 38DD but would like to be a 38C now. She measures into a 38 band size so this works out well.    A: Kayleigh signed for her arm sleeves. She is currently wearing the arm sleeve she received from me last week. She went home with the three new Medi [place] arm sleeves 20-30 mmHg.    After that, I measured Kayleigh's band size and had her try on a 38C from the stock supply to try on the sample breast forms I brought along. She liked the  Mayra Contour bra in cocoa,  T Shirt bra in beige, and the  T Shirt Lace Contour bra in beige. I will place an order for these bras in sizes 38C and 40B. When trying on forms, Kayleigh really liked the size 8 of the [employer] #[id number] and #[id number] Massage forms in danyel. I will place an order of these forms in sizes eight in danyel, as well as two of the #[id number] MyForm Lightweight forms. I am ordering these because Kayleigh has more tissue left over on her left chest wall. I think this will help to achieve a more symmetric look. Order submitted for fabrication to [employer].     I discussed with Kayleigh that we can try shaper forms next if we cannot achieve symmetry with the normal breast forms due to her excess tissue to the left chest wall. I also discussed an option for custom breast forms with her and let her know that I will check insurance about that  option.    P: She is to call with any further needs in regards to her compression sleeves. Otherwise, she is scheduled two weeks out in [place] on 12/4/19 at 1:00 pm for a delivery.  Goal is to maintain a home program.

## 2021-06-03 NOTE — PROGRESS NOTES
Met with Kayleigh, and her , when she was at clinic for her radiation treatment. Her  Radiation treatment is scheduled to complete on 12/24. She will follow up with Dr. Brown post radiation completion. Date to be determined. She would like a copy of the Hormone Therapy [facility] booklet. Writer will send her a PDF via e-mail. She went to  [facility] week and was fitted for a sleeve. She will return in the near future to be fitted for a bra and prosthetics. She has not had any form in her bra since surgery. Writer gave her a a set of Knitted [place] forms to use in her bra in the interim. She has writer's contact information for future correspondence.

## 2021-06-03 NOTE — PROGRESS NOTES
RADIATION ONCOLOGY WEEKLY TREATMENT VISIT NOTE      Assessment / Impression       1. Malignant neoplasm of upper-outer quadrant of left breast in female, estrogen receptor positive (H)        Tolerating radiation therapy well.  All questions and concerns addressed.    Plan:     Continue radiation treatment as prescribed.    Subjective:      HPI: Kayleigh Sousa is a 64 y.o. female with    1. Malignant neoplasm of upper-outer quadrant of left breast in female, estrogen receptor positive (H)         The following portions of the patient's history were reviewed and updated as appropriate: allergies, current medications, past family history, past medical history, past social history, past surgical history and problem list.    Assessment                  Body Site: Breast                           Site: Left Breast  Stereotactic Radiosurgery: No  Concurrent Therapy: No  Today's Dose: 720  Total Dose for Breast: 5040  Today's Fraction/Total Fraction Breast:   Drainage: 0: Absent                                            Sexuality Alteration                 Emotional Alteration Copin: Effective  Comfort Alteration KPS: 100 % Normal, no complaints  Fatigue (ONS scale) : 0: No Fatigue  Pain Location: denies  Hot Flashes and/or Flushes: 1: Mild or no more than 1 per day(occ)   Nutrition Alteration Anorexia: 0: None  Nausea: 0: None  Vomitin: None  Skin Alteration Skin Sensation: 0: No problem  Skin Reaction: 0: None(radiplex given w/instructions)  AUA Assessment                                  Accompanied by       Objective:     Exam: Examination reviewed no significant changes.    Vitals:    19 1432   BP: 144/90   Pulse: 71   Temp: 97.7  F (36.5  C)   TempSrc: Oral   SpO2: 98%   Weight: 156 lb 8 oz (71 kg)       Wt Readings from Last 8 Encounters:   19 156 lb 8 oz (71 kg)   10/29/19 156 lb (70.8 kg)   10/23/19 157 lb 6.4 oz (71.4 kg)   10/17/19 157 lb (71.2 kg)   10/15/19 160 lb (72.6  kg)   09/25/19 160 lb (72.6 kg)   09/23/19 159 lb (72.1 kg)   09/10/19 160 lb 12.8 oz (72.9 kg)       General: Alert and oriented, in no acute distress  Kayleigh has no Erythema.  Aria chart and setup information reviewed    Yanna Thompson MD

## 2021-06-03 NOTE — TELEPHONE ENCOUNTER
Kayleigh reached out to writer this morning as she has misplaced her contact information to schedule PT, as ordered by Dr. Thompson,  and schedule with prosthetics, ordered by Dr. Hernandez. Contact information provided for [facility], [phone], and to  [facility] in , [phone]. Kayleigh reports doing well and is appreciative of the information relayed. Encouraged her to call anytime with any questions of concerns...Dinora Larose RN

## 2021-06-03 NOTE — PROGRESS NOTES
RADIATION ONCOLOGY WEEKLY TREATMENT VISIT NOTE      Assessment / Impression       1. Malignant neoplasm of upper-outer quadrant of left breast in female, estrogen receptor positive (H)        Tolerating radiation therapy well.  All questions and concerns addressed.    Plan:     Continue radiation treatment as prescribed.    Subjective:      HPI: Kayleigh Sousa is a 64 y.o. female with    1. Malignant neoplasm of upper-outer quadrant of left breast in female, estrogen receptor positive (H)         The following portions of the patient's history were reviewed and updated as appropriate: allergies, current medications, past family history, past medical history, past social history, past surgical history and problem list.    Assessment                  Body Site: Breast                           Site: Left Breast   Stereotactic Radiosurgery: No  Concurrent Therapy: No  Today's Dose: 1620  Total Dose for Breast: 5040  Today's Fraction/Total Fraction Breast:                                             Sexuality Alteration                 Emotional Alteration Copin: Effective  Comfort Alteration KPS: 100 % Normal, no complaints  Fatigue (ONS scale) : 2: Mild Fatigue  Pain Location: Denies  Hot Flashes and/or Flushes: 1: Mild or no more than 1 per day   Nutrition Alteration Anorexia: 0: None  Nausea: 0: None  Skin Alteration Skin Sensation: 0: No problem  Skin Reaction: 0: None  AUA Assessment                                  Accompanied by       Objective:     Exam: Examination reviewed no significant changes.    Vitals:    19 1439   BP: 157/83   Pulse: 72   SpO2: 97%   Weight: 155 lb 14.4 oz (70.7 kg)       Wt Readings from Last 8 Encounters:   19 155 lb 14.4 oz (70.7 kg)   19 156 lb 8 oz (71 kg)   10/29/19 156 lb (70.8 kg)   10/23/19 157 lb 6.4 oz (71.4 kg)   10/17/19 157 lb (71.2 kg)   10/15/19 160 lb (72.6 kg)   19 160 lb (72.6 kg)   19 159 lb (72.1 kg)       General:  Alert and oriented, in no acute distress  Kayleigh has no Erythema.  Aria chart and setup information reviewed    Yanna Thompson MD

## 2021-06-03 NOTE — TELEPHONE ENCOUNTER
Kayleigh called.  Left a VM that she had a couple of spit stitches that she needed removed.  I called her back, lmom on home and cell to give me a call and we can schedule that.

## 2021-06-03 NOTE — PROGRESS NOTES
RADIATION ONCOLOGY WEEKLY TREATMENT VISIT NOTE      Assessment / Impression       1. Malignant neoplasm of upper-outer quadrant of left breast in female, estrogen receptor positive (H)        Tolerating radiation therapy well.  All questions and concerns addressed.    Plan:     Continue radiation treatment as prescribed.    Subjective:      HPI: Kayleigh Sousa is a 64 y.o. female with    1. Malignant neoplasm of upper-outer quadrant of left breast in female, estrogen receptor positive (H)         The following portions of the patient's history were reviewed and updated as appropriate: allergies, current medications, past family history, past medical history, past social history, past surgical history and problem list.    Assessment                  Body Site: Breast                           Site: Left Breast  Stereotactic Radiosurgery: No  Concurrent Therapy: No  Today's Dose: 2160  Total Dose for Breast: 5040  Today's Fraction/Total Fraction Breast:                                             Sexuality Alteration                 Emotional Alteration Copin: Effective  Comfort Alteration KPS: 100 % Normal, no complaints  Fatigue (ONS scale) : 1: Mild Fatigue  Pain Location: Denies  Hot Flashes and/or Flushes: 1: Mild or no more than 1 per day   Nutrition Alteration Nausea: 0: None  Skin Alteration Skin Sensation: 0: No problem  Skin Reaction: 0: None  AUA Assessment                                  Accompanied by       Objective:     Exam: Examination reviewed no significant changes.    Vitals:    19 1436   BP: 135/70   Pulse: 62   Temp: 97.9  F (36.6  C)   TempSrc: Oral   SpO2: 97%   Weight: 157 lb 6.4 oz (71.4 kg)       Wt Readings from Last 8 Encounters:   19 157 lb 6.4 oz (71.4 kg)   19 155 lb 14.4 oz (70.7 kg)   19 156 lb 8 oz (71 kg)   10/29/19 156 lb (70.8 kg)   10/23/19 157 lb 6.4 oz (71.4 kg)   10/17/19 157 lb (71.2 kg)   10/15/19 160 lb (72.6 kg)   19  160 lb (72.6 kg)       General: Alert and oriented, in no acute distress  Kayleigh has no Erythema.  Aria chart and setup information reviewed    Yanna Thompson MD

## 2021-06-04 VITALS
OXYGEN SATURATION: 92 % | HEART RATE: 67 BPM | DIASTOLIC BLOOD PRESSURE: 86 MMHG | TEMPERATURE: 97.5 F | SYSTOLIC BLOOD PRESSURE: 168 MMHG | WEIGHT: 156.1 LBS | BODY MASS INDEX: 27.65 KG/M2

## 2021-06-04 VITALS
TEMPERATURE: 97.9 F | OXYGEN SATURATION: 97 % | DIASTOLIC BLOOD PRESSURE: 70 MMHG | SYSTOLIC BLOOD PRESSURE: 135 MMHG | BODY MASS INDEX: 27.88 KG/M2 | WEIGHT: 157.4 LBS | HEART RATE: 62 BPM

## 2021-06-04 VITALS
SYSTOLIC BLOOD PRESSURE: 137 MMHG | DIASTOLIC BLOOD PRESSURE: 75 MMHG | OXYGEN SATURATION: 96 % | WEIGHT: 155.9 LBS | BODY MASS INDEX: 27.62 KG/M2 | HEART RATE: 61 BPM

## 2021-06-04 VITALS
HEART RATE: 64 BPM | TEMPERATURE: 98.2 F | DIASTOLIC BLOOD PRESSURE: 76 MMHG | SYSTOLIC BLOOD PRESSURE: 134 MMHG | WEIGHT: 156.7 LBS | BODY MASS INDEX: 27.76 KG/M2 | OXYGEN SATURATION: 96 %

## 2021-06-04 VITALS
HEART RATE: 65 BPM | WEIGHT: 152.9 LBS | BODY MASS INDEX: 27.09 KG/M2 | TEMPERATURE: 97.9 F | OXYGEN SATURATION: 96 % | DIASTOLIC BLOOD PRESSURE: 93 MMHG | SYSTOLIC BLOOD PRESSURE: 176 MMHG

## 2021-06-04 VITALS
TEMPERATURE: 97.7 F | DIASTOLIC BLOOD PRESSURE: 77 MMHG | OXYGEN SATURATION: 98 % | BODY MASS INDEX: 27.56 KG/M2 | HEART RATE: 58 BPM | SYSTOLIC BLOOD PRESSURE: 160 MMHG | WEIGHT: 155.6 LBS

## 2021-06-04 VITALS
SYSTOLIC BLOOD PRESSURE: 157 MMHG | BODY MASS INDEX: 27.62 KG/M2 | WEIGHT: 155.9 LBS | OXYGEN SATURATION: 97 % | HEART RATE: 72 BPM | DIASTOLIC BLOOD PRESSURE: 83 MMHG

## 2021-06-04 VITALS
WEIGHT: 151 LBS | OXYGEN SATURATION: 98 % | BODY MASS INDEX: 26.75 KG/M2 | SYSTOLIC BLOOD PRESSURE: 138 MMHG | HEART RATE: 56 BPM | DIASTOLIC BLOOD PRESSURE: 84 MMHG | HEIGHT: 63 IN

## 2021-06-04 VITALS
BODY MASS INDEX: 27.21 KG/M2 | SYSTOLIC BLOOD PRESSURE: 146 MMHG | HEART RATE: 57 BPM | OXYGEN SATURATION: 97 % | HEIGHT: 63 IN | DIASTOLIC BLOOD PRESSURE: 79 MMHG | WEIGHT: 153.6 LBS

## 2021-06-04 VITALS
HEART RATE: 60 BPM | WEIGHT: 150 LBS | OXYGEN SATURATION: 95 % | SYSTOLIC BLOOD PRESSURE: 140 MMHG | BODY MASS INDEX: 26.57 KG/M2 | DIASTOLIC BLOOD PRESSURE: 78 MMHG

## 2021-06-04 NOTE — PROGRESS NOTES
I called and left a message on Kayleigh's cell phone that our treatment machine is down for today and we are expected to be back up and operational for her treatment time on Tuesday 12/17 @ 2:15.

## 2021-06-04 NOTE — PROGRESS NOTES
[facility] Daily Progress Note  Patient Name: Kayleigh Sousa  Date of evaluation: 11/15/2019  Today's Date: 12/16/2019   Visit Number: 5/12  Insurance:[employer] - no group therapy  Referral Diagnosis: Malignant neoplasm of upper-outer quadrant of left breast in female, estrogen receptor positive (H)  Referring provider: Yanna Thompson MD  Visit Diagnosis:     ICD-10-CM    1. Decreased range of motion of shoulder, left M25.612    2. Lymphedema I89.0        Assessment:         Patient's ROM in Left shoulder is improving well. She reported just a stretch during session today, vs. A pinch that she used to get.   Bilat Mastectomy scars have significant adherence, avoided left d/t radiation effects right now.  We will keep PT appointments as she is going through radiation to keep improving on her shoulder ROM and initiate lymphatic drainage so she can have an established home program for reducing her risk of lymphedema/infection  Patient tolerated session well today.     Goals:   Pt. will demonstrate/verbalize independence in self-management of condition in : 12 weeks  Patient will reach / maintain arm movement: overhead;for dressing;with less pain;with less difficulty;in 6 weeks    Patient will have decreased fibrosis, scar tissue for improved lymphatic mobilitiy : in 6 weeks  Pt will: decrease score on SPADI by 8 points to demonstrate improved function of shoulders and decreased pain       Plan / Patient Instructions:      Plan for next visit: continue with lymphatic drain on trunk, neck, inguinal nodes. See if patient got scar pad (from [employer]). continue with left>right arm stretching, scar mobilization on right mastectomy incision.  Hold MFR on left d/t radiation effects.    Continue instruction in posture and strengthening and MLD instruction in self lymphatic drainage to address L UE/UQ lymphedema avoiding clearing to R axilla as with h/o LND on R per patient report.      Continue to monitor and  "assist patient with proper garment options as may benefit from glove/gauntlet on L in addition to L sleeve. And may consider compression tank/erna at future date once effects of radiation resolved.     Subjective:         Patient's friend present during session.    Started radiation 11/13/2019 (28 total) last day will be 12-    Pain Rating:mildly tight in left arm    Functional limitations are described as occurring with:   lifting  reaching overhead and behind back  rolling to either side; dressing tighter shirts         Objective:       Skin is tender and stout on left upper quadrant region, effects from radiation.  Still significant scar adherence, worked on right side today, started to loosen mildly.    Exercises:  Exercise #1: shoulder flexion wall walk - double arms  Comment #1: shoulder flexion wall walk - unilateral   Exercise #2: supine butterfly arms with supine LTR reviewed  Comment #2: unilateral pec stretch on doorway  Exercise #3: \"snow berry arms\" on wall    Treatment Today  12/16/2019   TREATMENT MINUTES COMMENTS   Evaluation     Self-care/ Home management     Manual therapy 45 Stretching to bilat shoulders L>R in all directions. No MFR on left mastectomy incision d/t radiated skin.   Scar mobilization to right mastectomy incision.    Patient does have sutures coming out of incisions (1 on right, 4 on left).   Neuromuscular Re-education     Therapeutic Activity     Therapeutic Exercises     Gait training     Modality__________________                Total 45    Blank areas are intentional and mean the treatment did not include these items.              Raquel Ledezma, PT, CLT  12/16/2019                 "

## 2021-06-04 NOTE — PROGRESS NOTES
Patient is here today for provider visit for Malignant neoplasm of upper-outer quadrant of left breast in female, estrogen receptor positive.

## 2021-06-04 NOTE — PROGRESS NOTES
[facility] Daily Progress Note  Patient Name: Kayleigh Sousa  Date of evaluation: 11/15/2019  Today's Date: 12/12/2019   Visit Number: 4/12  Insurance:[employer] - no group therapy  Referral Diagnosis: Malignant neoplasm of upper-outer quadrant of left breast in female, estrogen receptor positive (H)  Referring provider: Yanna Thompson MD  Visit Diagnosis:     ICD-10-CM    1. Decreased range of motion of shoulder, left M25.612    2. Lymphedema I89.0        Assessment:       Patient's ROM in Left shoulder is improving well. She reported just a stretch during session today, vs. A pinch that she used to get.   Bilat Mastectomy scars have significant adherence, avoided left d/t radiation effects right now.  We will keep PT appointments as she is going through radiation to keep improving on her shoulder ROM and initiate lymphatic drainage so she can have an established home program for reducing her risk of lymphedema/infection  Patient tolerated session well today.     Goals:   Pt. will demonstrate/verbalize independence in self-management of condition in : 12 weeks  Patient will reach / maintain arm movement: overhead;for dressing;with less pain;with less difficulty;in 6 weeks    Patient will have decreased fibrosis, scar tissue for improved lymphatic mobilitiy : in 6 weeks  Pt will: decrease score on SPADI by 8 points to demonstrate improved function of shoulders and decreased pain       Plan / Patient Instructions:      Plan for next visit: continue with lymphatic drain on trunk, neck, inguinal nodes. See if patient got scar pad (from [employer]). continue with left>right arm stretching, scar mobilization on right mastectomy incision.  Hold MFR on left d/t radiation effects.    Continue instruction in posture and strengthening and MLD instruction in self lymphatic drainage to address L UE/UQ lymphedema avoiding clearing to R axilla as with h/o LND on R per patient report.      Continue to monitor and  "assist patient with proper garment options as may benefit from glove/gauntlet on L in addition to L sleeve. And may consider compression tank/erna at future date once effects of radiation resolved.     Subjective:         Patient's friend present during session.    Started radiation 11/13/2019 (28 total) last day will be 12-    Pain Rating:mildly tight in left arm    Functional limitations are described as occurring with:   lifting  reaching overhead and behind back  rolling to either side; dressing tighter shirts         Objective:       Skin is tender and stout on left upper quadrant region, effects from radiation.  Still significant scar adherence, worked on right side today, started to loosen mildly.    Exercises:  Exercise #1: shoulder flexion wall walk - double arms  Comment #1: shoulder flexion wall walk - unilateral   Exercise #2: supine butterfly arms with supine LTR reviewed  Comment #2: unilateral pec stretch on doorway  Exercise #3: \"snow berry arms\" on wall    Treatment Today  12/12/2019   TREATMENT MINUTES COMMENTS   Evaluation     Self-care/ Home management 15 Instruction on scar pad that she can purchase at [employer] or [employer] on right mastectomy scar. Instruction on garden glove for helping with moving left UE sleeve, it fell down a little and had to reposition today.    Manual therapy 30 Stretching to bilat shoulders L>R in all directions. No MFR on left mastectomy incision d/t radiated skin.   Scar mobilization to right mastectomy incision.   Neuromuscular Re-education     Therapeutic Activity     Therapeutic Exercises     Gait training     Modality__________________                Total 45    Blank areas are intentional and mean the treatment did not include these items.              Raquel Ledezma, PT, CLT  12/12/2019                 "

## 2021-06-04 NOTE — PROGRESS NOTES
[facility] Daily Progress Note  Patient Name: Kayleigh Sousa  Date of evaluation: 11/15/2019  Today's Date: 12/9/2019   Visit Number: 3/12  Insurance:[employer] - no group therapy  Referral Diagnosis: Malignant neoplasm of upper-outer quadrant of left breast in female, estrogen receptor positive (H)  Referring provider: Yanna Thompson MD  Visit Diagnosis:     ICD-10-CM    1. Decreased range of motion of shoulder, left M25.612    2. Lymphedema I89.0        Assessment:       Patient's ROM in Left shoulder is improving well. She reported just a stretch during session today, vs. A pinch that she was getting last session.   We have 4 more visits scheduled in PT right now. We will keep these as she is going through radiation to keep improving on her shoulder ROM and initiate lymphatic drainage so she can have an established home program for reducing her risk of lymphedema/infection  Patient tolerated session well today.     She continues to have significantadherance at bilat mastectomy incisions, gave patient elastogel for scar release to wear in her tanktop/bra. But, she may not be able to do this until after radiation is finished d/t skin may not tolerate tight clothing    Goals:   Pt. will demonstrate/verbalize independence in self-management of condition in : 12 weeks  Patient will reach / maintain arm movement: overhead;for dressing;with less pain;with less difficulty;in 6 weeks    Patient will have decreased fibrosis, scar tissue for improved lymphatic mobilitiy : in 6 weeks  Pt will: decrease score on SPADI by 8 points to demonstrate improved function of shoulders and decreased pain       Plan / Patient Instructions:      Plan for next visit: start lymphatic drain on trunk, neck, inguinal nodes. See how scar pad is doing, continue with left>right arm stretching, scar mobilization on right mastectomy incision.  Hold MFR on left d/t radiation effects.    Continue instruction in posture and strengthening  "and MLD instruction in self lymphatic drainage to address L UE/UQ lymphedema avoiding clearing to R axilla as with h/o LND on R per patient report.      Continue to monitor and assist patient with proper garment options as may benefit from glove/gauntlet on L in addition to L sleeve. And may consider compression tank/erna at future date once effects of radiation resolved.     Subjective:         Patient's spouse, Cady, present during session.    Started radiation 11/13/2019 (28 total)    Pain Rating:mildly tight in left arm    Functional limitations are described as occurring with:   lifting  reaching overhead and behind back  rolling to either side; dressing tighter shirts         Objective:       Skin is tender and stout, effects from radiation.  Still significant scar adherence.    Exercises:  Exercise #1: shoulder flexion wall walk - double arms  Comment #1: shoulder flexion wall walk - unilateral   Exercise #2: supine butterfly arms with supine LTR reviewed  Comment #2: unilateral pec stretch on doorway  Exercise #3: \"snow berry arms\" on wall    Treatment Today  12/9/2019   TREATMENT MINUTES COMMENTS   Evaluation     Self-care/ Home management 10 Instruction on elastogel for scar mobilization on right mastectomy, instruction on posture   Manual therapy 30 Stretching to bilat shoulders L>R in all directions. No MFR d/t radiated skin.    Neuromuscular Re-education  Initiated instruction in proper Posture and in increased body awareness throghout day including minibreak conceptevery hour and issued wriiten instructions including diaphragmatic breathing to engage parasympathetic nervous system, roll shoulders back and down, relax exram mm tension, check posture and engage core especially midlle/lower traps.   Therapeutic Activity     Therapeutic Exercises 15 Added to HEP, above   Gait training     Modality__________________                Total 55 Started patient early and kept her late, patient's appt was only " scheduled for 30 minutes   Blank areas are intentional and mean the treatment did not include these items.              Raquel Ledezma, PT, CLT  12/9/2019

## 2021-06-04 NOTE — PROGRESS NOTES
I met with Kayleigh to present her SCP. I explained the contents of the care plan and reviewed the treatment summary in its' entirety. She voiced understanding. I pointed out the survivorship resources should she be interested in online or reading resources. I pointed out my business card and asked if she could look the plan over in the next week or so as I would like to call her on follow up. She again voiced understanding. I congratulated her on her survivorship and thanked her for her time. Ángel

## 2021-06-04 NOTE — PROGRESS NOTES
[facility] Daily Progress Note  Patient Name: Kayleigh Sousa  Date of evaluation: 11/15/2019  Today's Date: 12/23/2019   Visit Number: 7/12  Insurance:[employer] - no group therapy  Referral Diagnosis: Malignant neoplasm of upper-outer quadrant of left breast in female, estrogen receptor positive (H)  Referring provider: Vadim Whitaker MD  Visit Diagnosis:     ICD-10-CM    1. Decreased range of motion of shoulder, left M25.612    2. Lymphedema I89.0        Assessment:       Overall, patient is doing well with her bilateral shoulder range of motion.   She is keeping up with her stretches as she has gone through radiation.   She is finishing radiation this week.   She continues to have significant scar adherence and some sutures coming through.   At this time, we discussed holding on PT for about 3-4 weeks as her left axilla/left upper quadrant tissue calms down from radiation. Then, we will resume PT to look further at rang of motion, scar mobility, and assess for any arm swelling.   Patient agrees with plan.     Goals:   Pt. will demonstrate/verbalize independence in self-management of condition in : 12 weeks  Patient will reach / maintain arm movement: overhead;for dressing;with less pain;with less difficulty;in 6 weeks    Patient will have decreased fibrosis, scar tissue for improved lymphatic mobilitiy : in 6 weeks  Pt will: decrease score on SPADI by 8 points to demonstrate improved function of shoulders and decreased pain       Plan / Patient Instructions:      Plan for next visit: continue with lymphatic drain on trunk, neck, inguinal nodes. See if patient got scar pad (from [employer]). continue with left>right arm stretching, scar mobilization on right mastectomy incision.  Hold MFR on left d/t radiation effects.    Follow up in 3-4 weeks.    Subjective:         Doing well with shoulder ROM. Left upper quadrant skin is getting irritated from radiation.    Started radiation 11/13/2019  "(28 total) last day will be 12-    Pain Rating: mildly tight in left arm    Functional limitations are described as occurring with:   lifting  reaching overhead and behind back  rolling to either side; dressing tighter shirts       Objective:       Skin is tender and stout on left upper quadrant region, effects from radiation, She also has some scratches on left neck, left upper back. She thinks from itcing her skin when she is not quite awake.    Patient notes some pain with left arm reaching - ed to back off extreme stretching as her skin is getting irritated from radiation.  Still significant scar adherence, worked on right side today, started to loosen mildly.    Exercises:  Exercise #1: shoulder flexion wall walk - double arms  Comment #1: shoulder flexion wall walk - unilateral   Exercise #2: supine butterfly arms with supine LTR reviewed  Comment #2: unilateral pec stretch on doorway  Exercise #3: \"snow berry arms\" on wall    Treatment Today  12/23/2019   TREATMENT MINUTES COMMENTS   Evaluation     Self-care/ Home management     Manual therapy 45 Stretching to bilat shoulders L>R in all directions. No MFR on left mastectomy incision d/t radiated skin.   Scar mobilization to right mastectomy incision.    Patient does have sutures coming out of incisions (1 on right, 4 on left).   Neuromuscular Re-education     Therapeutic Activity     Therapeutic Exercises     Gait training     Modality__________________                Total 45    Blank areas are intentional and mean the treatment did not include these items.              Raquel Ledezma, PT, CLT  12/23/2019                 "

## 2021-06-04 NOTE — PROGRESS NOTES
[facility] Daily Progress Note  Patient Name: Kayleigh Sousa  Date of evaluation: 11/15/2019  Today's Date: 12/18/2019   Visit Number: 6/12  Insurance:[employer] - no group therapy  Referral Diagnosis: Malignant neoplasm of upper-outer quadrant of left breast in female, estrogen receptor positive (H)  Referring provider: Vadim Whitaker MD  Visit Diagnosis:     ICD-10-CM    1. Decreased range of motion of shoulder, left M25.612    2. Lymphedema I89.0        Assessment:       Patient's ROM in Left shoulder is improving well.   Bilat Mastectomy scars have significant adherence, avoided left d/t radiation effects right now. We will keep PT appointments as she is going through radiation to keep improving on her shoulder ROM and initiate lymphatic drainage so she can have an established home program for reducing her risk of lymphedema/infection.  Patient tolerated session well today.     Goals:   Pt. will demonstrate/verbalize independence in self-management of condition in : 12 weeks  Patient will reach / maintain arm movement: overhead;for dressing;with less pain;with less difficulty;in 6 weeks    Patient will have decreased fibrosis, scar tissue for improved lymphatic mobilitiy : in 6 weeks  Pt will: decrease score on SPADI by 8 points to demonstrate improved function of shoulders and decreased pain       Plan / Patient Instructions:      Plan for next visit: continue with lymphatic drain on trunk, neck, inguinal nodes. See if patient got scar pad (from [employer]). continue with left>right arm stretching, scar mobilization on right mastectomy incision.  Hold MFR on left d/t radiation effects.    Continue instruction in posture and strengthening and MLD instruction in self lymphatic drainage to address L UE/UQ lymphedema avoiding clearing to R axilla as with h/o LND on R per patient report.      Continue to monitor and assist patient with proper garment options as may benefit from glove/gauntlet  "on L in addition to L sleeve. And may consider compression tank/erna at future date once effects of radiation resolved.     Subjective:         Started radiation 11/13/2019 (28 total) last day will be 12-    Pain Rating:mildly tight in left arm    Functional limitations are described as occurring with:   lifting  reaching overhead and behind back  rolling to either side; dressing tighter shirts       Objective:       Skin is tender and stout on left upper quadrant region, effects from radiation, patient notes some pain with left arm reaching - ed to back off extreme stretching as her skin is getting irritated from radiation.  Still significant scar adherence, worked on right side today, started to loosen mildly.    Exercises:  Exercise #1: shoulder flexion wall walk - double arms  Comment #1: shoulder flexion wall walk - unilateral   Exercise #2: supine butterfly arms with supine LTR reviewed  Comment #2: unilateral pec stretch on doorway  Exercise #3: \"snow berry arms\" on wall    Treatment Today  12/18/2019   TREATMENT MINUTES COMMENTS   Evaluation     Self-care/ Home management     Manual therapy 45 Stretching to bilat shoulders L>R in all directions. No MFR on left mastectomy incision d/t radiated skin.   Scar mobilization to right mastectomy incision.    Patient does have sutures coming out of incisions (1 on right, 4 on left).   Neuromuscular Re-education     Therapeutic Activity     Therapeutic Exercises     Gait training     Modality__________________                Total 45    Blank areas are intentional and mean the treatment did not include these items.              Raquel Ledezma, PT, CLT  12/18/2019                 "

## 2021-06-04 NOTE — PROGRESS NOTES
Patient here ambulatory for final radiation therapy treatment.  Patient states she has some bright erythema in the area with dry desquamation.  Reinforced skin cares with patient.  Written discharge instructions reviewed and given to patient.  Follow up with Dr. Thompson in about 4 to 6 weeks.  Patient able to verbalize understanding and left ambulatory with copy of appointments.

## 2021-06-04 NOTE — PROGRESS NOTES
Met with Kayleigh when she came to clinic for radiation treatment. She will complete her treatment on 12/27. She reports that she has fatigue but is alternating with rest and activity. She has gone to PT and they have encouraged her to stay active. She has been watching exercise tapes with walking and walks in place at home, tries to walk outside on the warmer days. Skin is starting to become irritated but it managing with lotion provided. Will follow up in the near future at clinic visit.

## 2021-06-04 NOTE — TELEPHONE ENCOUNTER
----- Message from Jere Brown MD sent at 12/20/2019  9:53 AM CST -----  Please inform her that her vitamin D level is lower end of normal.  Start calcium and vitamin D as suggested in the clinic yesterday.  No additional vitamin D at this point.  We will plan to recheck in the next visit.  thanks

## 2021-06-04 NOTE — PROGRESS NOTES
Met with Kayleigh briefly when she came to clinic for her last radiation treatment. She is happy to complete treatment and overall feels like she tolerated treatment well. She saw Dr Brown on 12/19 and plans to start anastrazole a week after radiation completion. She is scheduled for follow up with Dr. Thompson on 2/5. She will continue PT as scheduled. She has writer's number for future correspondence.

## 2021-06-04 NOTE — PROGRESS NOTES
Radiation Treatment Summary    Patient: Kayleigh Sousa   MRN: [id number]  : 1955  Care Provider: Yanna Thompson    Date of Service: 2019        Beba Hernandez MD  [facility] [address] [address]             Dear Dr. Hernandez:     Your patient Mrs. Kayleigh Sousa completed her radiation therapy on 2019. As you know Ms. Sousa is a 64 y.o. 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  The patient had a new diagnosis of her left breast cancer, stage T1c N1 M0, ER/WV positive, HER-2 negative, status post bilateral mastectomy and a left sentinel lymph node resection followed by dissection, 2/8+ lymph nodes with positive AMISH and a negative margin.  The Oncotype DX score is 9.  She received postop radiation therapy on clinic for her breast cancer with a total dose of 5040 cGy in 28 treatments targeted to the left chest wall and the regional lymph nodes.  Her radiation therapy was given from 2019- 2019.  She tolerated radiation therapy reasonably well with expected acute side effects.  She is scheduled to return to radiation oncology in 4 weeks for routine post therapy office follow-up.    Again, thank you very much for the referral and allowing me to participate in the care of this patient.  If you have any question about this patient, please do not hesitate to call.    Sincerely,      Yanna Thompson MD, PhD  Department of Radiation Oncology   [facility]  Tel: [phone]  Page: [phone]    [facility]  [address] [place] [address]     [address]   [place] [address]    CC:  Patient Care Team:  Vadim Whitaker MD as PCP - General (Internal Medicine)  Vadim Whitaker MD as Assigned PCP  Beba Hernandez MD as Physician (General Surgery)  Jere Brown MD as Physician (Hematology and Oncology)  Yanna Thompson MD as Physician  (Radiation Oncology)  Dinora Larose, RN as Oncology Nurse Navigator (Hematology and Oncology)

## 2021-06-04 NOTE — PROGRESS NOTES
RADIATION ONCOLOGY WEEKLY TREATMENT VISIT NOTE      Assessment / Impression       1. Malignant neoplasm of upper-outer quadrant of left breast in female, estrogen receptor positive (H)        Tolerating radiation therapy well.  All questions and concerns addressed.    Plan:     Continue radiation treatment as prescribed.    Subjective:      HPI: Kaylegih Sousa is a 64 y.o. female with    1. Malignant neoplasm of upper-outer quadrant of left breast in female, estrogen receptor positive (H)         The following portions of the patient's history were reviewed and updated as appropriate: allergies, current medications, past family history, past medical history, past social history, past surgical history and problem list.    Assessment                  Body Site: Breast                           Site: Left Breast  Stereotactic Radiosurgery: No  Concurrent Therapy: No  Today's Dose: 3780  Total Dose for Breast: 5040  Today's Fraction/Total Fraction Breast:   Drainage: 0: Absent                                            Sexuality Alteration                 Emotional Alteration Copin: Effective  Comfort Alteration KPS: 90% Can perform normal activity, minor signs of disease  Fatigue (ONS scale) : 6: Moderate Fatigue  Pain Location: denies  Hot Flashes and/or Flushes: 1: Mild or no more than 1 per day   Nutrition Alteration Anorexia: 0: None  Nausea: 0: None  Vomitin: None  Skin Alteration Skin Sensation: 0: No problem  Skin Reaction: 2: Bright erythema(add'l radiaplex & mepilex lite given w/instructions)  AUA Assessment                                  Accompanied by       Objective:     Exam: mild Erythema.    Vitals:    19 1434   BP: 160/77   Pulse: (!) 58   Temp: 97.7  F (36.5  C)   TempSrc: Oral   SpO2: 98%   Weight: 155 lb 9.6 oz (70.6 kg)       Wt Readings from Last 8 Encounters:   19 155 lb 9.6 oz (70.6 kg)   12/10/19 156 lb 11.2 oz (71.1 kg)   19 157 lb 6.4 oz (71.4 kg)    11/26/19 155 lb 14.4 oz (70.7 kg)   11/19/19 156 lb 8 oz (71 kg)   10/29/19 156 lb (70.8 kg)   10/23/19 157 lb 6.4 oz (71.4 kg)   10/17/19 157 lb (71.2 kg)       General: Alert and oriented, in no acute distress  Kayleigh has mild Erythema.  Aria chart and setup information reviewed    Yanna Thompson MD

## 2021-06-04 NOTE — TELEPHONE ENCOUNTER
I called and left voicemail regarding vitamin D level as was on the lower end of normal and to start calcium and vitamin D, no additional at this time, and will recheck at next visit. Audrey Mathew, CMA

## 2021-06-04 NOTE — PROGRESS NOTES
RADIATION ONCOLOGY WEEKLY TREATMENT VISIT NOTE      Assessment / Impression       1. Malignant neoplasm of upper-outer quadrant of left breast in female, estrogen receptor positive (H)       Tolerating radiation therapy well.  All questions and concerns addressed.    Plan:     Continue radiation treatment as prescribed.    Subjective:      HPI: Kayleigh Sousa is a 64 y.o. female with    1. Malignant neoplasm of upper-outer quadrant of left breast in female, estrogen receptor positive (H)         The following portions of the patient's history were reviewed and updated as appropriate: allergies, current medications, past family history, past medical history, past social history, past surgical history and problem list.    Assessment                  Body Site: Breast                           Site: Left Breast  Stereotactic Radiosurgery: No  Concurrent Therapy: No  Today's Dose: 3060  Total Dose for Breast: 5040  Today's Fraction/Total Fraction Breast:   Drainage: 0: Absent                                            Sexuality Alteration                 Emotional Alteration Copin: Effective  Comfort Alteration KPS: 100 % Normal, no complaints  Fatigue (ONS scale) : 7: Extreme Fatigue  Pain Location: denies  Hot Flashes and/or Flushes: 1: Mild or no more than 1 per day   Nutrition Alteration Anorexia: 0: None  Nausea: 0: None  Vomitin: None  Skin Alteration Skin Sensation: 0: No problem  Skin Reaction: 1: Faint erythema or dry desquamation  AUA Assessment                                  Accompanied by       Objective:     Exam: Examination reviewed no significant changes.    Vitals:    12/10/19 1433   BP: 134/76   Pulse: 64   Temp: 98.2  F (36.8  C)   TempSrc: Oral   SpO2: 96%   Weight: 156 lb 11.2 oz (71.1 kg)       Wt Readings from Last 8 Encounters:   12/10/19 156 lb 11.2 oz (71.1 kg)   19 157 lb 6.4 oz (71.4 kg)   19 155 lb 14.4 oz (70.7 kg)   19 156 lb 8 oz (71 kg)    10/29/19 156 lb (70.8 kg)   10/23/19 157 lb 6.4 oz (71.4 kg)   10/17/19 157 lb (71.2 kg)   10/15/19 160 lb (72.6 kg)       General: Alert and oriented, in no acute distress  Kayleigh has mild Erythema.  Aria chart and setup information reviewed    Yanna Thompson MD

## 2021-06-04 NOTE — PROGRESS NOTES
RADIATION ONCOLOGY WEEKLY TREATMENT VISIT NOTE      Assessment / Impression       1. Malignant neoplasm of upper-outer quadrant of left breast in female, estrogen receptor positive (H)       Cancer Staging  Cancer of left breast (H)  Staging form: Breast, AJCC 8th Edition  - Clinical: No stage assigned - Unsigned  - Pathologic: Stage IA (pT1c, pN1a, cM0, G2, ER+, IN+, HER2-) - Signed by Jere Brown MD on 2019     Plan:   1. Continue radiation treatment as prescribed. Tolerating radiation therapy well.    2. Continue skin cares.  3. ROM exercises for next 5-6 months.  4. Follow up in 4-6 weeks with Dr. Thompson.   5. Activity modification PRN fatigue.  6. Long term follow up with medical oncology.     Radiation: Site: Left Breast  Stereotactic Radiosurgery: No  Stereotactic Radiosurgery: No  Concurrent Therapy: No  Today's Dose: 4680  Total Dose for Breast: 5040  Today's Fraction/Total Fraction Breast:     Subjective:      HPI: Kayleigh Sousa is a 64 y.o. female with    1. Malignant neoplasm of upper-outer quadrant of left breast in female, estrogen receptor positive (H)         The following portions of the patient's history were reviewed and updated as appropriate: allergies, current medications, past family history, past medical history, past social history, past surgical history and problem list.    Assessment                  Body Site: Breast                           Site: Left Breast  Stereotactic Radiosurgery: No  Concurrent Therapy: No  Today's Dose: 4680  Total Dose for Breast: 5040  Today's Fraction/Total Fraction Breast:   Drainage: 0: Absent                                            Sexuality Alteration                 Emotional Alteration Copin: Effective  Comfort Alteration KPS: 90% Can perform normal activity, minor signs of disease  Fatigue (ONS scale) : 6: Moderate Fatigue  Pain Location: skin of breast  Pain Intensity. Rate degree of pain ranging from 0 (no  pain) to 10 (severe pain) : 3  Pain Description: Burning - Burning, hot, fire type pain  Effectiveness of pain intervention: 3: Pain relieved 75%  Hot Flashes and/or Flushes: 1: Mild or no more than 1 per day   Nutrition Alteration Anorexia: 0: None  Nausea: 0: None  Vomitin: None  Skin Alteration Skin Sensation: 2: Burning  Skin Reaction: 3: Dry desquamation with or without erythema  AUA Assessment                                  Accompanied by       Objective:     Exam:   Vitals:    19 1244   BP: 137/75   Pulse: 61   SpO2: 96%   Weight: 155 lb 14.4 oz (70.7 kg)       Wt Readings from Last 8 Encounters:   19 155 lb 14.4 oz (70.7 kg)   19 156 lb 1.6 oz (70.8 kg)   19 155 lb 9.6 oz (70.6 kg)   12/10/19 156 lb 11.2 oz (71.1 kg)   19 157 lb 6.4 oz (71.4 kg)   19 155 lb 14.4 oz (70.7 kg)   19 156 lb 8 oz (71 kg)   10/29/19 156 lb (70.8 kg)       General: Alert and oriented, in no acute distress  Kayleigh has mod-severe Erythema with small area of desquamation over clavicle.     Treatment Summary to Date    Aria chart and setup information reviewed    IRaquel MD personally performed the services described in this documentation, as scribed by Jason Gonzalez in my presence, and it is both accurate and complete.    Signed by: Raquel Sosa MD, MPH

## 2021-06-04 NOTE — PROGRESS NOTES
[facility] Hematology and Oncology Progress Note    Patient: Kayleigh Sousa  MRN: [id number]  Date of Service: 12/19/2019      Reason for Visit    Chief Complaint   Patient presents with     HE Cancer     Malignant neoplasm of upper-outer quadrant of left breast in female, estrogen receptor positive       Assessment and Plan  Cancer Staging  Cancer of left breast (H)  Staging form: Breast, AJCC 8th Edition  - Clinical: No stage assigned - Unsigned  - Pathologic: Stage IA (pT1c, pN1a, cM0, G2, ER+, ME+, HER2-) - Signed by Jere Brown MD on 12/19/2019      ECOG Performance   ECOG Performance Status: 0     Distress Assessment  Distress Assessment Score: 6    Pain  Currently in Pain: No/denies    #. pT1c pN1a cM0 invasive ductal carcinoma of the left breast, ER strongly positive, ME positive, HER-2 negative.  Grade 2, 15 mm.  2 out of 3 sentinel lymph nodes were positive for metastatic carcinoma with extracapsular extension, and additional 5 lymph nodes were negative for malignancy.  Margins were negative.  Oncotype DX recurrence score 9 with distant recurrence risk at 9-year with endocrine therapy is 12%, no apparent chemotherapy benefit.   She is going to complete radiation next week.     She is here to discuss about adjuvant endocrine therapy.  I discussed about aromatase inhibitor as a preferred choice of endocrine therapy in postmenopausal woman.  I reviewed the side effects in detail and I gave her a handout to review.  She will start anastrozole about a week after completion of radiation.   Follow-up with me in 3 months to assess tolerability or toxicity.   No need for surveillance mammogram.    #.  Vitamin D deficiency   Will check vitamin D level today.  She is advised to start calcium and vitamin D for now.  If it is deficient, I will add additional vitamin D.    #.  Dental procedure   From oncology standpoint, she will follow routine management per her dentist.  She does not need any additional care or  intervention from our standpoint.    Problem List    1. Malignant neoplasm of upper-outer quadrant of left breast in female, estrogen receptor positive (H)  anastrozole (ARIMIDEX) 1 mg tablet   2. Vitamin deficiency  Vitamin D, Total (25-Hydroxy)      ______________________________________________________________________________  Diagnosis  September 2019-   pT1c pN1a cM0 invasive ductal carcinoma of the left breast    grade 2, 15 mm.     ER positive (greater than 95%), CO positive (10%), HER-2/hakeem negative (1+ by IHC)   2 out of 3 sentinel lymph nodes were positive for metastatic carcinoma with extracapsular extension, and additional 5 lymph nodes were negative for malignancy.  Margins were negative.     Oncotype DX recurrence score 9 with distant recurrence risk at 9-year with endocrine therapy is 12%, no apparent chemotherapy benefit.    Treatment to date  9/25/2019-left breast mastectomy and left sentinel lymph node biopsy, right breast mastectomy.  12/27/2019-plan to complete adjuvant radiation to the left breast.  1/2020-started anastrozole.    History of Present Illness    Kayleigh presents today accompanied by her friend.    She is going to finish radiation next week.  She wants to discuss about adjuvant endocrine therapy which she is planning to start soon after completion of radiation.  Overall doing well.  No major side effects or radiation.  She wants to know about any additional measures that she need to take for possible tooth extraction.    Pain Status  Currently in Pain: No/denies    Review of Systems    Oncology Nurse Assessment/CMA Intake: Constitutional  Constitutional (WDL): Exceptions to WDL  Hot flashes/Night Sweats: Concerns(intermittently)  Neurosensory  Neurosensory (WDL): All neurosensory elements are within defined limits  Eye   Eye Disorder (WDL): All eye disorder elements are within defined limits  Ear  Ear Disorder (WDL): All ear disorder elements are within defined  limits  Cardiovascular  Cardiovascular (WDL): All cardiovascular elements are within defined limits  Pulmonary  Respiratory (WDL): Within Defined Limits  Gastrointestinal  Gastrointestinal (WDL): All gastrointestinal elements are within defined limits  Genitourinary  Genitourinary (WDL): All genitourinary elements are within defined limits  Lymphatic  Lymph (WDL): All lymph disorder elements are within defined limits  Musculoskeletal and Connective Tissue  Musculoskeletal and Connetive Tissue Disorders (WDL): Exceptions to WDL  Arthralgia: Concerns(intermittently)  Integumentary  Integumentary (WDL): All integumentary elements are within defined limits  Patient Coping  Patient Coping: Accepting  Accompanied by  Accompanied by: Friend  Oral Chemo Adherence         Past History  Past Medical History:   Diagnosis Date     Leukopenia     mild-3100 wbc     PAC (premature atrial contraction)        Physical Exam    Recent Vitals 12/19/2019   Weight 156 lbs 2 oz   BSA (m2) 1.77 m2   /86   Pulse 67   Temp 97.5   Temp src 1   SpO2 92   Some recent data might be hidden     General: alert, awake, not in acute distress  Appears nervous.   HEENT: Head: Normal, normocephalic, atraumatic.  Eye: Normal external eye, conjunctiva, lids cornea, JAN.  Ears:  Non-tender.  Nose: Normal external nose, mucus membranes and septum.  Pharynx: Dental Hygiene adequate. Normal buccal mucosa. Normal pharynx.  Neck / Thyroid: Supple, no masses, nodes, nodules or enlargement.  Lymphatics: No abnormally enlarged lymph nodes.  Chest: Normal chest wall and respirations. Clear to auscultation.  Heart: S1 S2 RRR, no murmur.   Abdomen: abdomen is soft without significant tenderness, masses, organomegaly or guarding  Extremities: normal strength, tone, and muscle mass  Skin: normal. no rash or abnormalities  CNS: non focal.      Lab Results    No results found for this or any previous visit (from the past 168 hour(s)).    Imaging    Dxa Bone  Density Scan    Result Date: 12/17/2019 12/17/2019 RE: Kayleigh Sousa YOB: 1955 Dear Jere Brown, Patient Profile: 64 y.o. female, postmenopausal, is here for the first bone density test. History of fractures - None. Family history of osteoporosis - None.  Family history of hip fracture: None. Smoking history - No. Osteoporosis treatment past -  No. Osteoporosis treatment current - No.  Chronic medical problems - Breast cancer and Radiation treatment. High risk medications -  None. Assessment: 1. The spine bone density L1-L4 with T-score -0.7. 2. Femoral bone densities show left femoral neck T- score -0.9 and right femoral neck T-score -1.2. 3. Trabecular bone score indicates good trabecular bone architecture. 64 y.o. female with LOW BONE DENSITY (OSTEOPENIA) and LOW fracture risk, adjusted for the TBS, with major osteoporotic fracture risk 4.6% and hip fracture risk 0.4%. Abnormality was seen on the right mid femur, which requires further images and evaluation. Recommendations: Appropriate calcium, vitamin D supplements, along with balance and weight bearing exercise recommended with follow up bone density scan in 2 years. Bone densitometry was performed on your patient using our [employer] densitometer. The results are summarized and a copy of the actual scans are included for your review. In conformity with the International Society of Clinical Densitometry's most recent position statement for DXA interpretation (2015), the diagnosis will be made on the lowest measured T-score of the lumbar spine, femoral neck, total proximal femur or 33% radius. Note the change in terminology for diagnostic classification from OSTEOPENIA to LOW BONE MASS. All trending for sequential exams will be done using multiple vertebrae or the total proximal femur. Fracture risk is based on the WHO Fracture Risk Assessment Tool (FRAX). If additional information is needed or if you would like to discuss the results,  please do not hesitate to call me. Thank you for referring this patient to [facility]. We are happy to be of service in support of you and your practice. If you have any questions or suggestions to improve our service, please call me at [phone]. Sincerely, Colten Clark M.D. CDedraCMITCH. Osteoporosis Services, [facility]       Signed by: Jere Brown MD

## 2021-06-05 VITALS
DIASTOLIC BLOOD PRESSURE: 76 MMHG | WEIGHT: 155.9 LBS | HEART RATE: 61 BPM | OXYGEN SATURATION: 98 % | BODY MASS INDEX: 27.62 KG/M2 | SYSTOLIC BLOOD PRESSURE: 150 MMHG

## 2021-06-05 VITALS
HEIGHT: 63 IN | DIASTOLIC BLOOD PRESSURE: 96 MMHG | TEMPERATURE: 97.5 F | WEIGHT: 157.9 LBS | SYSTOLIC BLOOD PRESSURE: 150 MMHG | HEART RATE: 62 BPM | OXYGEN SATURATION: 98 % | BODY MASS INDEX: 27.98 KG/M2 | RESPIRATION RATE: 18 BRPM

## 2021-06-05 NOTE — PROGRESS NOTES
[facility] Daily Progress Note  Patient Name: Kayleigh Sousa  Date of evaluation: 11/15/2019  Today's Date: 1/27/2020   Visit Number: 8/12  Insurance:[employer] - no group therapy  Referral Diagnosis: Malignant neoplasm of upper-outer quadrant of left breast in female, estrogen receptor positive (H)  Referring provider: Yanna Thompson MD  Visit Diagnosis:     ICD-10-CM    1. Decreased range of motion of shoulder, left M25.612    2. Lymphedema I89.0        Assessment:       Patient has finished radiation 12-.   Doing exercises, tries to do something every day.   Patient did get silicone scar pads, hasn't tried them yet.   She felt she had mild swelling in left hand for 2-3 days over the past month. Since then, it has decreased now that she is back to wearing her sleeve regularly.   Feeling ok with ROM on left, but it hasn't caught up with right shoulder yet.  Patient will return to PT in 1-2 months to remeasure volume and one final check on arm status.      Goals:   Pt. will demonstrate/verbalize independence in self-management of condition: MET  Patient will reach / maintain arm movement: overhead;for dressing;with less pain;with less difficulty: MET  Patient will have decreased fibrosis, scar tissue for improved lymphatic mobility: IMPROVING  Pt will: decrease score on SPADI by 8 points to demonstrate improved function of shoulders and decreased pain:MET       Plan / Patient Instructions:      Plan for next visit: remeasure volume, check scar mobility    Follow up in 1-2 months. Anticipate DC.    Subjective:         Doing well with shoulder ROM. Left upper quadrant skin is much improved s/p radiation 1 month.    No pain.        Objective:       Surgical incisions still adhered, but improving.   Skin no longer peeling.   ROM WNL bilaterally in shoulder.    Exercises: reviewed below and UE lymph ex.   Exercise #1: shoulder flexion wall walk - double arms  Comment #1: shoulder flexion wall walk -  "unilateral   Exercise #2: supine butterfly arms with supine LTR reviewed  Comment #2: unilateral pec stretch on doorway  Exercise #3: \"snow berry arms\" on wall    UE volume: measured 1/27/2020  Right Upper Extremity Total Estimated Volume (cm3): 2029.24  Left Upper Extremity Total Estimated Volume (cm3): 1899.12  Left UE change of volume from initial (%): -5.07  Upper Extremity Swelling Comparison (%): 1.41 %      Treatment Today  1/27/2020   TREATMENT MINUTES COMMENTS   Evaluation     Self-care/ Home management 10 Reviewed keeping up with exercises. Instruction on what to watch for in the future with swelling. Instruction on wearing sleeve still d/t only 1 month out from radiaion   Manual therapy 39 MFR bilat mastectomy incisions/  Scar mobilization mastectomy incision.    No sutures visible any longer   Neuromuscular Re-education     Therapeutic Activity     Therapeutic Exercises     Gait training     Modality__________________                Total 49    Blank areas are intentional and mean the treatment did not include these items.              Raquel Ledezma, PT, CLT  1/27/2020                 "

## 2021-06-05 NOTE — TELEPHONE ENCOUNTER
Called patient for routine follow up call s/p radiation for her breast cancer.  Left message telling patient she did not need to call unless she had questions or concerns.  Confirmed follow up appointment and left call back number for patient.

## 2021-06-06 NOTE — PROGRESS NOTES
OFFICE VISIT NOTE    Subjective:   Chief Complaint:  Hypertension    64-year-old woman who has a longstanding history of hypertension.  Currently takes amlodipine, metoprolol, hydrochlorothiazide.  Blood pressures been fairly well controlled.  She is concerned that some swelling around her eyes and face could be related to the metoprolol.  Did recently have breast cancer surgery with radiation.  She is over the radiation now.  Generally feeling well.  Some minor depression at times regarding her cancer but prognosis for her is actually quite good.    Current Outpatient Medications   Medication Sig     amLODIPine (NORVASC) 10 MG tablet Take 1 tablet (10 mg total) by mouth daily.     anastrozole (ARIMIDEX) 1 mg tablet Take 1 tablet (1 mg total) by mouth daily.     aspirin 81 mg chewable tablet Chew 81 mg daily.      potassium chloride (K-DUR,KLOR-CON) 20 MEQ tablet Take 1 tablet (20 mEq total) by mouth daily.     triamterene-hydrochlorothiazide (DYAZIDE) 37.5-25 mg per capsule Take 1 capsule by mouth daily.     atenoloL (TENORMIN) 50 MG tablet Take 1 tablet (50 mg total) by mouth daily.       Review of Systems:  A comprehensive review of systems is negative except for the comments above    Objective:    /78   Pulse 60   Wt 150 lb (68 kg)   SpO2 95%   BMI 26.57 kg/m    GENERAL: No acute distress.  Weight is stable.  Today's blood pressure 136/88  Pulse 60 and regular.  Lungs are clear with no wheezes.  Heart shows a sinus rhythm without murmur gallop or rub.  No peripheral edema.  No JVD    Assessment & Plan   Kayleigh Sousa is a 64 y.o. female.    Hypertension.  Early good control the patient does not feel well on metoprolol.  I will switch her to atenolol 50 mg and see how she feels on that medication and whether the blood pressure is controlled.  She will continue amlodipine as well as Dyazide.  I will bring her back in 4 or 5 weeks.    Diagnoses and all orders for this visit:    Essential hypertension  -      atenoloL (TENORMIN) 50 MG tablet; Take 1 tablet (50 mg total) by mouth daily.  Dispense: 30 tablet; Refill: 2        Vadim Whitaker MD  Transcription using voice recognition software, may contain typographical errors.

## 2021-06-06 NOTE — PROGRESS NOTES
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  The patient had a new diagnosis of her left breast cancer, stage T1c N1 M0, ER/LA positive, HER-2 negative, status post bilateral mastectomy and a left sentinel lymph node resection followed by dissection, 2/8+ lymph nodes with positive AMISH and a negative margin.  The Oncotype DX score is 9.      TYPE OF RADIATION THERAPY ADMINISTERED:  5040 cGy in 28 treatments targeted to the left chest wall and the regional lymph nodes.  Her radiation therapy was given from 11/13/2019- 12/27/2019.     INTERVAL SINCE COMPLETION OF RADIATION THERAPY: 6 weeks.      SUBJECTIVE:  Ms. Sousa is a 64 y.o. female who had a history of right breast DCIS diagnosed initially in 2008.  The patient is status post lumpectomy and adjuvant radiation therapy at [facility].  I do not have the copy of radiation record at the time of evaluation.  The patient declined hormonal therapy and therefore has been followed closely.  She presented with a recent history of abnormal finding by routine screening mammogram for which she was a seeking further evaluation.  The mammogram followed by ultrasound showed a 1.3 x 1.0 x 1.0 cm hypoechoic lesion in the left breast 2 o'clock position 10 cm from nipple suspicious for malignancy.  Ultrasound-guided needle biopsy on 8/23/2019 confirmed diagnosis of invasive ductal carcinoma, ER/LA positive, HER-2 negative.  Patient underwent bilateral mastectomy in the left sentinel lymph node biopsy followed by lymph node dissection on 9/25/2019.  The pathology reviewed 15 x 10 x 10 mm invasive ductal carcinoma, Gilma grade 2 with associated DCIS.  The margins were negative with nearest margin measured 2.0 cm from deep and inferior margin.  2/3 sentinel  lymph node showed evidence of metastatic disease with the largest metastatic deposit measured 9 x 9 mm with evidence of extranodal extension.  5 additional removed lymph node showed no evidence of metastatic disease.  Postoperatively she has been recovering well.  Her Oncotype DX score was 9 indicating low risk of recurrence. She received postop radiation therapy on clinic for her breast cancer with a total dose of 5040 cGy in 28 treatments targeted to the left chest wall and the regional lymph nodes.  Her radiation therapy was given from 11/13/2019- 12/27/2019.  She tolerated radiation therapy reasonably well with expected acute side effects.     Patient has been recovering well since completion of the therapy patient that any pain and discomfort at time of evaluation.  She is here for routine post therapy office follow-up.    Medications were reviewed and are up to date on EPIC.    The following portions of the patient's history were reviewed and updated as appropriate: allergies, current medications, past family history, past medical history, past social history, past surgical history and problem list.    Review of Systems:      General  Constitutional (WDL): Exceptions to WDL  Fatigue: Fatigue relieved by rest(states 5/10)  Hot flashes/Night Sweats: Mild symptoms, no intervention needed(mild)  EENT  Eye Disorder (WDL): Exceptions to WDL(swelling around eyes)  Ear Disorder (WDL): All ear disorder elements are within defined limits  Respiratory   Respiratory (WDL): Within Defined Limits  Cardiovascular  Cardiovascular (WDL): All cardiovascular elements are within defined limits  Endocrine     Gastrointestinal  Gastrointestinal (WDL): All gastrointestinal elements are within defined limits  Musculoskeletal  Musculoskeletal and Connetive Tissue Disorders (WDL): All Musculoskeletal and Connetive Tissue Disorder elements are within defined limits  Integumentary               Integumentary (WDL): Exceptions to  WDL(radiation dermatitis left chest wall)  Neurological  Neurosensory (WDL): Exceptions to WDL  Ataxia: Asymptomatic, clinical or diagnostic observations only, intervention not indicated  Peripheral Sensory Neuropathy: Asymptomatic, loss of deep tendon reflexes or paresthesia(occ in feet)  Psychological/Emotional   Patient Coping: Accepting  Hematological/Lymphatic  Lymph (WDL): All lymph disorder elements are within defined limits  Dermatologic     Genitourinary/Reproductive  Genitourinary (WDL): All genitourinary elements are within defined limits  Reproductive     Pain              Currently in Pain: No/denies  Accompanied by  Accompanied by: Alone    Objective:      PHYSICAL EXAMINATION:    BP (!) 176/93   Pulse 65   Temp 97.9  F (36.6  C) (Oral)   Wt 152 lb 14.4 oz (69.4 kg)   SpO2 96%   BMI 27.09 kg/m      Gen: Alert, in NAD  Eyes: PERRL, EOMI, sclera anicteric  HENT     Head: NC/AT     Ears: No external auricular lesions     Nose/sinus: No rhinorrhea or epistaxis     Oropharynx: MMM, no visible oral lesions  Neck: Supple, full ROM, no LAD  Chest: Chest on this reveals postmastectomy changes.  There is a well-healed surgical scar.  There is no palpable lump or mass bilaterally in the chest wall, axillary, and supraclavicular region.  Skin within the radiation therapy field shows post therapy changes.  Pulm: No wheezing, stridor or respiratory distress  CV: Well-perfused, no cyanosis, no pedal edema  Abdominal: BS+, soft, nontender, nondistended, no hepatomegaly  Back: No step-offs or pain to palpation along the thoracolumbar spine  Rectal: Deferred  : Deferred  Musculoskeletal: Normal muscle bulk and tone  Skin: Normal color and turgor  Neurologic: A/Ox3, CN II-XII intact, normal gait and station  Psychiatric: Appropriate mood and affect      Impression     Patient is a 64-year-old female with a diagnosis of left breast cancer, status post surgery followed by postop radiation therapy completed 4 weeks  ago.  The patient has been doing well with no clinical evidence of cancer recurrence.    Assessment & Plan:     1.  Continue long-term follow-up and ongoing care for her breast cancer which Dr. Brown, medical oncology and Dr. Hernandez, breast surgeon as planned.  2.  Follow-up with radiation oncology as needed.      Face to face time  15 minutes with > 75% spent on consultation, education and coordination of care.      Yanna Thompson MD, PhD  Department of Radiation Oncology   [facility]  Tel: [phone]  Page: [phone]    [facility]  [address] [place] [address]     [address] [place] [address]    CC:  Patient Care Team:  Vadim Whitaker MD as PCP - General (Internal Medicine)  Vadim Whitaker MD as Assigned PCP  Beba Hernandez MD as Physician (General Surgery)  Jere Brown MD as Physician (Hematology and Oncology)  Yanna Thompson MD as Physician (Radiation Oncology)  Dinora Larose RN as Oncology Nurse Navigator (Hematology and Oncology)

## 2021-06-06 NOTE — TELEPHONE ENCOUNTER
Allergic Reaction    Patient started Metoprolol 100mg  on 11/26/2019     Eyelids are swollen and puffy. Top and bottom.  Since November.    Has an appointment with Dr. Whitaker next wed. 2/16/2020.    Lawanda Schroeder RN  [facility] Triage/refill nurse    Reason for Disposition    Patient wants to be seen    Mild facial swelling of unknown cause    Protocols used: FACE SWELLING-A-OH

## 2021-06-06 NOTE — TELEPHONE ENCOUNTER
Spoke with the patient and relayed message below from Dr. Whitaker.  Patient verbalized understanding and had no further questions at this time.  Appointment has been made for the patient to see Dr. Whitaker this afternoon at 3:20 pm.    Kirsty FLORIAN CMA/FAINA....................9:49 AM

## 2021-06-06 NOTE — PROGRESS NOTES
Met with Kayleigh when she came to clinic for follow up post radiation completion. She continues to wear a sleeve and has been fitted for a mastectomy bra. Writer gave her a couple additional  sets of knitted knockers as she finds them comfortable when she is lounging around the house. She has had some sadness over her body changes but is not sure that she wants to move forward with breast reconstruction in the future. Discussed tattoo (nipple or other design on chest) to help cover mastectomy scars to add another dimension of beauty which has been helpful to some survivors that have elected to stay flat. Tattoo information on [employer] was provided for her future conidseration. Will follow up at future clinic visit.

## 2021-06-06 NOTE — TELEPHONE ENCOUNTER
If patient needs to be seen today, have her come in this afternoon.  Waiting till next week could be a problem for her.

## 2021-06-06 NOTE — TELEPHONE ENCOUNTER
After speaking with Dr Brown I went thru the following statement:    If you are at higher risk of getting very sick from COVID-19, you should:    Stock up on supplies.    Take everyday precautions to keep space between yourself and others.    When you go out in public, keep away from others who are sick, limit close contact and wash your hands often.    Avoid crowds as much as possible.    Avoid cruise travel and non-essential air travel.    During a COVID-19 outbreak in your community, stay home as much as possible to further reduce your risk of being exposed.  Pt was also told the cases are expected to grow so it may be a good idea to keep her appt on Thursday with Dr Brown instead of rescheduling when there are potentially more cases. Hawa Gil RN

## 2021-06-06 NOTE — TELEPHONE ENCOUNTER
----- Message from Sissy Godfrey RN sent at 12/27/2019 11:56 AM CST -----  I presented RT discharge- [facility] breast

## 2021-06-06 NOTE — PROGRESS NOTES
Patient here ambulatory for follow up s/p radiation for her left breast cancer.  Patient states she is healing well with some discoloration still present in the treatment area.  Tenderness to both chest walls from surgery but states its minimal.  Feels she is having reaction to her BP medication and has called her PCP and will be seeing them today about that.  Also wondering about some of the side effects from her anastrozole and will contact medical oncology about those.  Seen by Dr. Thompson.  Plan RTC for follow up as directed by physician.

## 2021-06-07 NOTE — TELEPHONE ENCOUNTER
Called Kayleigh to check in after her recent follow up visit with Dr. Brown. Contact information provided  if any questions or concern arise.Calls invited. Will follow up in the future.

## 2021-06-07 NOTE — PROGRESS NOTES
"Kayleigh Sousa is a 64 y.o. female who is being evaluated via a billable telephone visit.      The patient has been notified of following:     \"This telephone visit will be conducted via a call between you and your physician/provider. We have found that certain health care needs can be provided without the need for a physical exam.  This service lets us provide the care you need with a short phone conversation.  If a prescription is necessary we can send it directly to your pharmacy.  If lab work is needed we can place an order for that and you can then stop by our lab to have the test done at a later time.    Telephone visits are billed at different rates depending on your insurance coverage. During this emergency period, for some insurers they may be billed the same as an in-person visit.  Please reach out to your insurance provider with any questions.    If during the course of the call the physician/provider feels a telephone visit is not appropriate, you will not be charged for this service.\"    Patient has given verbal consent to a Telephone visit? Yes    Additional provider notes:  See toxicity assessment.    Phone call duration: [phone]. Dr. Brown will call patient back.    Shyann Woo RN    Kayleigh reports that she started anastrozole soon after she finished radiation, around January 2020.  She admitted that she has been taking off-and-on because it causes hip and back pain.  She completed 30 tablets over a couple months.  And none for at least a month.  She feels pain has slightly improved.  She also reported that her blood pressure medication has changed around that time as well.    I reviewed with her that musculoskeletal aches and pain are common side effects from anastrozole but they tend to get better with exercises, proper hydration.  In some cases, we gave a treatment break which she already had about a month or longer.  I recommended her to retry again and incorporating regular exercises and " taking over-the-counter analgesic as needed.  She is willing to try that again.    Send anastrozole prescription to her pharmacy.  She requested 90-day supply.  She is advised to call our sooner with any concern before stopping medication if she has intolerable side effects.  Otherwise, follow-up with me in about 3 months.    Total phone call time: 12 minutes.

## 2021-06-07 NOTE — TELEPHONE ENCOUNTER
Called patient to check on how she is doing with ROM and pain.   Patient would like to schedule and in person visit with PT, doesn't feel a virtual visit would be helpful.   We scheduled one for June 4th at 11:15. She will call if that needs to change.

## 2021-06-08 NOTE — PROGRESS NOTES
[facility] Discharge Summary  Patient Name: Kayleigh Sousa  Date of evaluation: 11/15/2019  Today's Date: 6/4/2020   Visit Number: 9/12  Insurance:[employer] - no group therapy  Referral Diagnosis: Malignant neoplasm of upper-outer quadrant of left breast in female, estrogen receptor positive (H)  Referring provider: Yanna Thompson MD  Visit Diagnosis:     ICD-10-CM    1. Decreased range of motion of shoulder, left  M25.612    2. Lymphedema  I89.0        Assessment:       Patient appears to be doing well with regard to left UE volume, shoulder ROM and incisions healing. She isn't demonstrating UE swelling at all. She does appear to have pockets of fluid around bilateral mastectomy scars. Did discuss with her today about trying various light compression tank tops to prevent worsening of that fluid in that region.   Patient is starting to get into walking outside and doing some stretching exercises to keep her ROM. She wasn't wearing her left arm sleeve much at home, but has resumed wearing it again.   At this time, patient doesn't need to continue with PT, we will DC her chart.     Goals:   Pt. will demonstrate/verbalize independence in self-management of condition: MET  Patient will reach / maintain arm movement: overhead;for dressing;with less pain;with less difficulty: MET  Patient will have decreased fibrosis, scar tissue for improved lymphatic mobility: MET, though bilat mastectomy insicions have some fluid build up around cecilia.   Pt will: decrease score on SPADI by 8 points to demonstrate improved function of shoulders and decreased pain:MET       Plan / Patient Instructions:      DC PT.    Subjective:         Patient feeling like her left arm and clavicle region has a little swelling.     Feels a little puffy on left lateral trunk.      Objective:       No significant swelling noted via measuring arm or with PT observation, though she does still have fluid build up around bilat mastectomy  incisions.     Surgical incisions healed and moving better.    ROM WNL bilaterally in shoulder.    UE volume: measured 6/4/2020. About the same or mildly better compared to previous measurement.  Left Upper Extremity Total Estimated Volume (cm3): 1815.39      Treatment Today  6/4/2020   TREATMENT MINUTES COMMENTS   Evaluation     Self-care/ Home management 45 Remeasured volume.  Reviewed keeping up with exercises. Instruction on what to watch for in the future with swelling. Instruction on wearing sleeve when exercising and if she feels there is any swelling occurring. Discussion of various bras/tanks to trial to help with mastectomy region swelling.   Manual therapy  MFR bilat mastectomy incisions/  Scar mobilization mastectomy incision.    No sutures visible any longer   Neuromuscular Re-education     Therapeutic Activity     Therapeutic Exercises     Gait training     Modality__________________                Total 45    Blank areas are intentional and mean the treatment did not include these items.              Raquel Ledezma, PT, CLT  6/4/2020

## 2021-06-08 NOTE — TELEPHONE ENCOUNTER
Kayleigh called writer to check in. She relayed that she and her family are doing well and adjusting to the physical distancing challenges that the pandemic has imposed. Her brother in [place] had COVID-19 and is out of the hospital but lungs are still healing. Her extended family has stayed in touch with zoom meeting times. Her mother is 86 and talks with her regularly. Her  is still working but offices alone with little people contact. She has PT apt on 6/4 and has concerns about be around others at the clinic. Shared safety precautions that are being taken at the facilities and encouraged her to call the PT clinic to ask additional questions to ease her mind about going to her appointment. She feels that she is tolerating hormone therapy well. She appreciated supportive conversation. She has writer's contact information and will reach out in the future with any questions or needs. Writer will follow up in the future.

## 2021-06-08 NOTE — PROGRESS NOTES
"Kayleigh Sousa is a 64 y.o. female who is being evaluated via a billable video visit.      The patient has been notified of following:     \"This video visit will be conducted via a call between you and your physician/provider. We have found that certain health care needs can be provided without the need for an in-person physical exam.  This service lets us provide the care you need with a video conversation.  If a prescription is necessary we can send it directly to your pharmacy.  If lab work is needed we can place an order for that and you can then stop by our lab to have the test done at a later time.    Video visits are billed at different rates depending on your insurance coverage. Please reach out to your insurance provider with any questions.    If during the course of the call the physician/provider feels a video visit is not appropriate, you will not be charged for this service.\"    Patient has given verbal consent to a Video visit? Yes    Patient would like to receive their AVS by AVS Preference: Mail a copy.    Patient would like the video invitation sent by: Text to cell phone: [phone]    Will anyone else be joining your video visit? No        Video Start Time: 9:40 AM video aspect of this visit failed.  We switched to audio only.    Additional provider notes: This visit was telephone as a video failed.  64-year-old woman with a history of hypertension as well as breast cancer.  She is being followed by oncology.  Currently takes atenolol 50 mg in the morning along with amlodipine and Dyazide.  Despite these 3 medicines she has been running pressures as high as 160/100.  At other times she is 150/95.  No cardiopulmonary symptoms.  If she takes an extra atenolol, blood pressure will come down.  No fainting or near syncope.  No TIAs.  No edema.  Assessment hypertension still not well controlled.  Increase atenolol to 50 mg twice daily.  Other medications remain the same.  She will need a basic " metabolic profile drawn in July.  Instructed to call me as needed if blood pressures are not well controlled.    Video-Visit Details    Type of service:  Video Visit    Video End Time (time video stopped): 10:02 AM  Originating Location (pt. Location): Home    Distant Location (provider location):  [facility] INTERNAL MEDICINE     Platform used for Video Visit: Unable to complete video visit      Vadim Whitaker MD

## 2021-06-10 NOTE — PROGRESS NOTES
Patient is here today for provider visit for    Malignant neoplasm of upper-outer quadrant of left breast in female, estrogen receptor positive (H)

## 2021-06-10 NOTE — PROGRESS NOTES
Office Visit - Physical   Kayleigh Sousa   64 y.o.  female in for annual physical examination.    Date of visit: 8/12/2020  Physician: Vadim Whitaker MD     Assessment and Plan   1. Routine general medical examination at a health care facility  Normal physical exam today.  - Lipid Cascade    2. History of breast cancer  Bilateral mastectomies.  The medication anastrozole is causing lots of muscle aches and joint pain.  She has stopped it.  She will discuss options with her oncologist.  3. Essential hypertension  Today's blood pressure 140/84.  She will continue the same meds.  - Urinalysis-UC if Indicated    4. Hx of adenomatous colonic polyps  She is overdue for colonoscopy.  She should get that done in the next 4 to 6 months.    5. Medication management  Medications as listed.  No trouble except for anastrozole.  - HM1(CBC and Differential)  - Comprehensive Metabolic Panel  - HM1 (CBC with Diff)      The following high BMI interventions were performed this visit: encouragement to exercise    No follow-ups on file.     Chief Complaint   No chief complaint on file.       Patient Profile   Social History     Social History Narrative    , 2 children        Past Medical History   Patient Active Problem List   Diagnosis     Essential hypertension     History of breast cancer     Hx of adenomatous colonic polyps     Medication management     Cancer of left breast (H)     Arthralgia of both knees       Past Surgical History  She has a past surgical history that includes Cyst Removal (Right); Breast lumpectomy (Right, 2008); Appendectomy (2011); NM [place] Node Injection (9/25/2019); pr mastectomy, simple, complete (Bilateral, 9/25/2019); and Mastectomy.     History of Present Illness   This 64 y.o. old female in for physical exam and wellness visit.  She underwent bilateral mastectomies in the past because of breast cancer.  She finished radiation.  Now taking anastrozole which is causing muscle aches.  That  "basically is her only complaint.  No chest pain or shortness of breath.  No coughing or wheezing or asthma  No change in bowel or bladder habits.  No TIAs, epilepsy, neuropathy.  No tremor.  No dermatologic problems.  No significant ENT problems.  She needs some dental work.    Review of Systems: A comprehensive review of systems was negative except as noted.     Medications and Allergies   Current Outpatient Medications   Medication Sig Dispense Refill     amLODIPine (NORVASC) 10 MG tablet Take 1 tablet (10 mg total) by mouth daily. 90 tablet 2     amoxicillin (AMOXIL) 500 MG capsule Take 500 mg by mouth 3 (three) times a day.       anastrozole (ARIMIDEX) 1 mg tablet Take 1 tablet (1 mg total) by mouth daily. 90 tablet 1     aspirin 81 mg chewable tablet Chew 81 mg daily.        atenoloL (TENORMIN) 50 MG tablet Take 1 tablet (50 mg total) by mouth daily. 30 tablet 2     potassium chloride (K-DUR,KLOR-CON) 20 MEQ tablet Take 1 tablet (20 mEq total) by mouth daily. 30 tablet 11     triamterene-hydrochlorothiazide (DYAZIDE) 37.5-25 mg per capsule Take 1 capsule by mouth daily. 90 capsule 3     No current facility-administered medications for this visit.      Allergies   Allergen Reactions     D And C Red No.40 Unknown     Fd And C No.5 (Tartrazine) Unknown     Fd And C Yellow No.6 (Sunset Yellow Fcf) Rash        Family and Social History   Family History   Problem Relation Age of Onset     Hypertension Mother      Hypertension Father      Heart attack Father      Breast cancer Cousin      Brain cancer Cousin         Social History     Tobacco Use     Smoking status: Never Smoker     Smokeless tobacco: Never Used   Substance Use Topics     Alcohol use: Not Currently     Comment: \"rare\"     Drug use: Never        Physical Exam   General Appearance:   Does not appearing woman in no distress.  Blood pressure 140/84.  Pulse is 56    There were no vitals taken for this visit.    EYES: Eyelids, conjunctiva, and sclera were " normal. Pupils were normal. Cornea, iris, and lens were normal bilaterally.  HEAD, EARS, NOSE, MOUTH, AND THROAT: Head and face were normal. Hearing was normal to voice and the ears were normal to external exam. Nose appearance was normal and there was no discharge. Oropharynx was normal.  NECK: Neck appearance was normal. There were no neck masses and the thyroid was not enlarged.  No bruits.  RESPIRATORY: Breathing pattern was normal and the chest moved symmetrically.  Percussion/auscultatory percussion was normal.  Lung sounds were normal and there were no abnormal sounds.  CARDIOVASCULAR: Heart rate and rhythm were normal.  S1 and S2 were normal and there were no extra sounds or murmurs. Peripheral pulses in arms and legs were normal.  Jugular venous pressure was normal.  There was no peripheral edema.  GASTROINTESTINAL: The abdomen was normal in contour.  Bowel sounds were present.  Percussion detected no organ enlargement or tenderness.  Palpation detected no tenderness, mass, or enlarged organs.   MUSCULOSKELETAL: Skeletal configuration was normal and muscle mass was normal for age. Joint appearance was overall normal.  LYMPHATIC: There were no enlarged nodes.  SKIN/HAIR/NAILS: Skin color was normal.  There were no skin lesions.  Hair and nails were normal.  NEUROLOGIC: The patient was alert and oriented to person, place, time, and circumstance. Speech was normal. Cranial nerves were normal. Motor strength was normal for age. The patient was normally coordinated.  PSYCHIATRIC:  Mood and affect were normal and the patient had normal recent and remote memory. The patient's judgment and insight were normal.    ADDITIONAL VITAL SIGNS: Oxygen saturation 98%  CHEST WALL/BREASTS: Bilateral mastectomies.  RECTAL: Not checked  GENITAL/URINARY: Gynecology     Additional Information        Vadim Whitaker MD  Internal Medicine  Contact me at [phone]

## 2021-06-10 NOTE — TELEPHONE ENCOUNTER
Refill Approved    Rx renewed per Medication Renewal Policy. Medication was last renewed on 10/31/19.    Rosie Kelly, [facility] Triage/Med Refill 8/14/2020     Requested Prescriptions   Pending Prescriptions Disp Refills     amLODIPine (NORVASC) 10 MG tablet 90 tablet 2     Sig: Take 1 tablet (10 mg total) by mouth daily.       Calcium-Channel Blockers Protocol Passed - 8/14/2020  8:17 AM        Passed - PCP or prescribing provider visit in past 12 months or next 3 months     Last office visit with prescriber/PCP: 2/19/2020 Vadim Whitaker MD OR same dept: 2/19/2020 Vadim Whitaker MD OR same specialty: 2/19/2020 Vadim Whitaker MD  Last physical: 8/12/2020 Last MTM visit: Visit date not found   Next visit within 3 mo: Visit date not found  Next physical within 3 mo: Visit date not found  Prescriber OR PCP: Vadim Whitaker MD  Last diagnosis associated with med order: 1. Essential hypertension  - amLODIPine (NORVASC) 10 MG tablet; Take 1 tablet (10 mg total) by mouth daily.  Dispense: 90 tablet; Refill: 2    If protocol passes may refill for 12 months if within 3 months of last provider visit (or a total of 15 months).             Passed - Blood pressure filed in past 12 months     BP Readings from Last 1 Encounters:   08/12/20 138/84

## 2021-06-10 NOTE — PROGRESS NOTES
[facility] Hematology and Oncology Progress Note    Patient: Kayleigh Sousa  MRN: [id number]  Date of Service: 07/30/2020        Reason for Visit    1. Stage IA left breast cancer (ER/[place]+)    ECOG Performance   ECOG Performance Status: 0    Distress Assessment  Distress Assessment Score: 3    Pain  0    Assessment/Plan  1. Stage IA left breast cancer (ER/WV+)  Kayleigh is doing generally well today.   No clinical concerns for recurrence.    She has not been compliant in taking her anastrozole daily (estimates taking 3-4 days/week) secondary to moderate arthralgias, otherwise, tolerates well. She describes modest hip and knee joint pains. She hasn't tried any particular interventions for it. She walks some days, but not consistently. She will work on incorporating daily exercise, increasing hydration and could take chondroitin, Tylenol/ibuprofen as needed.     I discussed the true benefit in the adjuvant AI would be taking it daily for 5 years. I discussed the option of trying a different AI to see if she tolerates one better over another. Another option is tamoxifen, but with some lesser benefit since she is post-menopausal. Lastly, she can opt out of adjuvant endocrine therapy.    She wants to stay on endocrine therapy to lessen the risk of recurrence. She will continue on anastrozole daily and incorporate the above interventions.     Will arrange a virtual follow up in 3 mths to reassess progress. She can call sooner if needed.    Return to clinic in 6 months with exam.  She has had bilateral mastectomies, so no mammograms.    2. Vit D deficiency  3.   Osteopenia  Baseline DEXA scan (11/2019) showed osteopenia with low fracture risk.  Vit D level was low-end normal when last checked.  On calcium and Vit D. Weight bearing exercises.   Repeat DEXA every 2 years, next due 11/2021.    4.   Possible drug intolerance  She is concerned she is sensitive to her Dyazide (the yellow food dye in the med, in particular). She is  associating rhinitis, nasal/mouth pruritis the days she takes it and improvement the days she is not. She has been on the medication a long time. I suggested she check with her pharmacist to look into whether there have been any changes in brand and follow-up with her PCP if she feels changes should be considered.  ______________________________________________________________________________    History of Present Illness/ Interval History    Ms. Kayleigh Sousa  is a 64 y.o. returns for 3 month evaluation.  She is doing generally well. She has been on anastrazole since January. She has some moderate hip and knee pain since on it, so only takes it 3-4 days/week. She walks some days, but not all days. She feels she stays well-hydrated. She has not tried any OTC analgesics. No new pain. No dyspnea. No headaches. Weight stable. No chest wall concerns.    On July 13 she developed itchy nares, runny nose, itchy roof of mouth and scalp. Her eyelids looked a little swollen. No throat swelling or dyspnea. She feels it is related to her Dyazide medication, although has never had issues with it in the past. She has sensitivities to food dyes and noted this med has a yellow additive. She has been taking sporadically since, without recurrent symptoms.     Review of Systems  Constitutional  Constitutional (WDL): Exceptions to WDL  Fatigue: Concerns  Hot flashes/Night Sweats: Concerns  Neurosensory  Neurosensory (WDL): All neurosensory elements are within defined limits  Eye   Eye Disorder (WDL): All eye disorder elements are within defined limits  Ear  Ear Disorder (WDL): All ear disorder elements are within defined limits  Cardiovascular  Cardiovascular (WDL): All cardiovascular elements are within defined limits  Pulmonary  Respiratory (WDL): Within Defined Limits  Gastrointestinal  Gastrointestinal (WDL): Exceptions to WDL  Diarrhea: Concerns(intermittent)  Genitourinary  Genitourinary (WDL): All genitourinary elements are  "within defined limits  Lymphatic  Lymph (WDL): All lymph disorder elements are within defined limits  Musculoskeletal and Connective Tissue  Musculoskeletal and Connetive Tissue Disorders (WDL): Exceptions to WDL  Arthralgia: Concerns(bilateral knees)  Integumentary  Integumentary (WDL): All integumentary elements are within defined limits  Patient Coping  Patient Coping: Accepting;Open/discussion  Accompanied by  Accompanied by: Alone  Oral Chemo Adherence         Oncology History/Treatment  Diagnosis/Stage:   Sept, 2019: Stage IA (pT1c pN1a cM0) left breast invasive ductal cancer  -15 mm tumor, G2  -ER (>95%+), AR (10%+), HER2 neg (1+ IHC)  -2/3 SN + for malignancy with extracapsular extension. 5 additional nodes negative  -margins negative    Oncotype DX recurrence score 9: distant recurrence risk at 9-yr with endocrine therapy is 12%, no apparent chemo benefit    Treatment:  9/25/2019: L breast mastectomy and L SN biopsy + R mastecomy    12/27/2019: completed adjuvant RT L breast    1/2020: started anastrozole      Past History  Past Medical History:   Diagnosis Date     Leukopenia     mild-3100 wbc     PAC (premature atrial contraction)          Past Surgical History:   Procedure Laterality Date     APPENDECTOMY  2011     BREAST LUMPECTOMY Right 2008    DCIS tumorwas ER/AR positive, radiation follewed lumpectomy     CYST REMOVAL Right     axillary cyst drainage     MASTECTOMY       NM SENTINEL NODE INJECTION  9/25/2019     AR MASTECTOMY, SIMPLE, COMPLETE Bilateral 9/25/2019    Procedure: Bilateral Mastectomies with Left [place] Lymph Node Biopsy, Left axillary node dissection 23HR STAY;  Surgeon: Beba Hernandez MD;  Location: [facility];  Service: General       Physical Exam    Recent Vitals 7/30/2020   Height 5' 3\"   Weight 153 lbs 10 oz   BSA (m2) 1.76 m2   /79   Pulse 57   Temp -   Temp src -   SpO2 97   Some recent data might be hidden       GENERAL: Alert and oriented to time place and " person. Seated comfortably. In no distress.  HEAD: Atraumatic and normocephalic. No alopecia.  EYES: CARLOS, EOMI. No erythema. No icterus.  NECK: supple. No thyroid enlargement.  LYMPH NODES: No palpable supraclavicular, cervical, axillary lymphadenopathy.  BREASTS: post-bilateral mastectomy. Post-op changes, no masses or pain on chest wall.  CHEST: clear to auscultation bilaterally. Resonant to percussion throughout bilaterally. Symmetrical breath movements bilaterally.  CVS: S1 and S2 are heard. Regular rate and rhythm. No murmur or gallop or rub heard.  ABDOMEN: Soft. Not tender. Not distended. No palpable hepatomegaly or splenomegaly. No other mass palpable. Bowel sounds present.  EXTREMITIES: Warm. No peripheral edema.  SKIN: no rash, or bruising or purpura.   NEURO: No gross deficit noted. Non-antalgic gait.    Lab Results    No results found for this or any previous visit (from the past 168 hour(s)).    Imaging    No results found.    Billing  Total face to face time 25 minutes, with 20 minutes of that dedicated to counseling, education or coordination of care.     Signed by: Brittney Lima

## 2021-06-10 NOTE — TELEPHONE ENCOUNTER
FYI - Status Update  Who is Calling: Patient  Update: Patient is out of medication.  Please send today.  Okay to leave a detailed message?:  No return call needed

## 2021-06-11 NOTE — PROGRESS NOTES
Office Visit - Physical   Kayleigh HELENA Sousa   61 y.o.  female in for annual physical examination.  She still works as a .  She is fairly active.  She is rarely sick.  She generally feels well.      Date of visit: 6/19/2017  Physician: Vadim Whitaker MD     Assessment and Plan   1. Routine general medical examination at a health care facility      2. Essential hypertension with goal blood pressure less than 140/90  Today's blood pressure 132/84.  Medications will remain the same.    3. History of breast cancer  Breast cancer was 9 years ago.  No recurrence.    4. Hx of adenomatous colonic polyps  It is a colonoscopy 5 years.  Next one due is 2019.    5. Medication management  No obvious problems with medications.    6 need for vaccination she is due for Zostavax.  The following high BMI interventions were performed this visit: encouragement to exercise    No Follow-up on file.     Chief Complaint   Chief Complaint   Patient presents with     Annual Exam     fasting        Patient Profile   Social History     Social History Narrative    , 2 children        Past Medical History   Patient Active Problem List   Diagnosis     Essential hypertension     History of breast cancer     Hx of adenomatous colonic polyps       Past Surgical History  She has a past surgical history that includes Cyst Removal (Right); Breast lumpectomy (Right, 2008); and Appendectomy (2011).     History of Present Illness   This 61 y.o. old female is in for physical examination.  She still works about 80% of the time as a .  She has been healthy and she rarely misses work.  No injuries.  Denies any chest pain with exertion.  Does have hypertension but takes her medications.  No claudication.  No TIAs.  No chronic cough or wheezing.  No asthma  Denies any dysphasia.  No change in bowel habits.  Colonoscopy every 5 years.  She does have a past history of colon polyps.  No dysuria hematuria or history of stones.  No  "incontinence.  Denies any significant musculoskeletal problems such as knee pain hip pain etc.  No neurologic problems.  Denies TIAs.  Denies migraine headaches.  No epilepsy no history of neuropathy.    Review of Systems: A comprehensive review of systems was negative except as noted.     Medications and Allergies   Current Outpatient Prescriptions   Medication Sig Dispense Refill     amLODIPine (NORVASC) 10 MG tablet TAKE ONE TABLET BY MOUTH ONE TIME DAILY  90 tablet 3     lisinopril (PRINIVIL,ZESTRIL) 20 MG tablet TAKE ONE TABLET BY MOUTH ONE TIME DAILY 90 tablet 4     triamterene-hydrochlorothiazide (DYAZIDE) 37.5-25 mg per capsule Take 1 capsule by mouth daily. 90 capsule 3     No current facility-administered medications for this visit.      No Known Allergies     Family and Social History   Family History   Problem Relation Age of Onset     Hypertension Mother      Hypertension Father      Heart attack Father         Social History   Substance Use Topics     Smoking status: Never Smoker     Smokeless tobacco: Never Used     Alcohol use None        Physical Exam   General Appearance:   Healthy-appearing woman.  Today's blood pressure 132/84.    Pulse 89  Ht 5' 2.75\" (1.594 m)  Wt 163 lb (73.9 kg)  SpO2 98%  BMI 29.1 kg/m2    EYES: Eyelids, conjunctiva, and sclera were normal. Pupils were normal. Cornea, iris, and lens were normal bilaterally.  HEAD, EARS, NOSE, MOUTH, AND THROAT: Head and face were normal. Hearing was normal to voice and the ears were normal to external exam. Nose appearance was normal and there was no discharge. Oropharynx was normal.  NECK: Neck appearance was normal. There were no neck masses and the thyroid was not enlarged.  No bruits in the neck.  RESPIRATORY: Breathing pattern was normal and the chest moved symmetrically.  Percussion/auscultatory percussion was normal.  Lung sounds were normal and there were no abnormal sounds.  CARDIOVASCULAR: Heart rate and rhythm were normal.  S1 " and S2 were normal and there were no extra sounds or murmurs. Peripheral pulses in arms and legs were normal.  Jugular venous pressure was normal.  There was no peripheral edema.  Occasional ectopic beat is heard.  GASTROINTESTINAL: The abdomen was normal in contour.  Bowel sounds were present.  Percussion detected no organ enlargement or tenderness.  Palpation detected no tenderness, mass, or enlarged organs.  Appendectomy scar present.  No tenderness.  MUSCULOSKELETAL: Skeletal configuration was normal and muscle mass was normal for age. Joint appearance was overall normal.  I think the joints look normal for age.  LYMPHATIC: There were no enlarged nodes.  SKIN/HAIR/NAILS: Skin color was normal.  There were no skin lesions.  Hair and nails were normal.  NEUROLOGIC: The patient was alert and oriented to person, place, time, and circumstance. Speech was normal. Cranial nerves were normal. Motor strength was normal for age. The patient was normally coordinated.  PSYCHIATRIC:  Mood and affect were normal and the patient had normal recent and remote memory. The patient's judgment and insight were normal.    ADDITIONAL VITAL SIGNS: Pulse 78  CHEST WALL/BREASTS: Normal female.  Lumpectomy in the past.    RECTAL: Not checked.    GENITAL/URINARY: Saw gynecologist last year.  Gets Pap smears every 3 years.       Additional Information        Vadim Whitaker MD  Internal Medicine  Contact me at [phone]

## 2021-06-12 NOTE — TELEPHONE ENCOUNTER
I called and spoke with patient regarding her virtual visit with Brittney Lima CNP today for her 3 month follow up with supposal restarting her anastrazole. I had contacted patient to do her intake and assessment prior to NP reaching out to her to complete the visit. Patient claims that she did not know about her appointment today and expected to have upcoming appointment with Dr. Brown, not NP. I clarified that this was just a recheck on her restarting her anastrazole since she had not been taking it regularly due to joint aches and pains. I spoke with NICK Hernandez CNP that patient did not want visit today as she has NOT been taking her anastrazole at all and had stopped taking it a while ago due to those aches and did not know what she really wanted to do in terms of restarting alternative. Patient stated that she needs time to think about treatment options all together and would like to schedule a follow up with Dr. Brown in the next month or so. I informed this of NICK Lima CNP and informed scheduling to contact patient to help schedule this follow up with Dr. Brown at patient earliest convenience. I also instructed patient to call us if she does decide she wants to have a virtual visit with NICK Lima CNP and discuss alternatives prior to her appt with Dr. Brown. Patient agreed to doing this. Dr. Brown will plan to see patient as follow up here in the near future.    Audrey Mathew, [facility]

## 2021-06-12 NOTE — TELEPHONE ENCOUNTER
Refill Approved    Rx renewed per Medication Renewal Policy. Medication was last renewed on 6/17/19.    Rosie Kelly, [facility] Triage/Med Refill 11/3/2020     Requested Prescriptions   Pending Prescriptions Disp Refills     potassium chloride (K-DUR,KLOR-CON) 20 MEQ tablet [Pharmacy Med Name: Potassium Chloride Nataly ER 20 MEQ Oral Tablet Extended Release] 90 tablet 0     Sig: Take 1 tablet by mouth once daily       Potassium Supplements Refill Protocol Passed - 10/30/2020 10:06 AM        Passed - PCP or prescribing provider visit in past 12 months       Last office visit with prescriber/PCP: 2/19/2020 Vadim Whitaker MD OR same dept: 2/19/2020 Vadim Whitaker MD OR same specialty: 2/19/2020 Vadim Whitaker MD  Last physical: 8/12/2020 Last MTM visit: Visit date not found   Next visit within 3 mo: Visit date not found  Next physical within 3 mo: Visit date not found  Prescriber OR PCP: Vadim Whitaker MD  Last diagnosis associated with med order: 1. Essential hypertension  - potassium chloride (K-DUR,KLOR-CON) 20 MEQ tablet [Pharmacy Med Name: Potassium Chloride Nataly ER 20 MEQ Oral Tablet Extended Release]; Take 1 tablet by mouth once daily  Dispense: 90 tablet; Refill: 0  - triamterene-hydrochlorothiazide (DYAZIDE) 37.5-25 mg per capsule [Pharmacy Med Name: Triamterene-HCTZ 37.5-25 MG Oral Capsule]; Take 1 capsule by mouth once daily  Dispense: 90 capsule; Refill: 0    If protocol passes may refill for 12 months if within 3 months of last provider visit (or a total of 15 months).             Passed - Potassium level in last 12 months     Lab Results   Component Value Date    Potassium 4.0 08/12/2020                triamterene-hydrochlorothiazide (DYAZIDE) 37.5-25 mg per capsule [Pharmacy Med Name: Triamterene-HCTZ 37.5-25 MG Oral Capsule] 90 capsule 0     Sig: Take 1 capsule by mouth once daily       Diuretics/Combination Diuretics Refill Protocol  Passed - 10/30/2020 10:06 AM        Passed -  Visit with PCP or prescribing provider visit in past 12 months     Last office visit with prescriber/PCP: 2/19/2020 Vadim Whitaker MD OR same dept: 2/19/2020 Vadim Whitaker MD OR same specialty: 2/19/2020 Vadim Whitaker MD  Last physical: 8/12/2020 Last MTM visit: Visit date not found   Next visit within 3 mo: Visit date not found  Next physical within 3 mo: Visit date not found  Prescriber OR PCP: Vadim Whitaker MD  Last diagnosis associated with med order: 1. Essential hypertension  - potassium chloride (K-DUR,KLOR-CON) 20 MEQ tablet [Pharmacy Med Name: Potassium Chloride Nataly ER 20 MEQ Oral Tablet Extended Release]; Take 1 tablet by mouth once daily  Dispense: 90 tablet; Refill: 0  - triamterene-hydrochlorothiazide (DYAZIDE) 37.5-25 mg per capsule [Pharmacy Med Name: Triamterene-HCTZ 37.5-25 MG Oral Capsule]; Take 1 capsule by mouth once daily  Dispense: 90 capsule; Refill: 0    If protocol passes may refill for 12 months if within 3 months of last provider visit (or a total of 15 months).             Passed - Serum Potassium in past 12 months      Lab Results   Component Value Date    Potassium 4.0 08/12/2020             Passed - Serum Sodium in past 12 months      Lab Results   Component Value Date    Sodium 143 08/12/2020             Passed - Blood pressure on file in past 12 months     BP Readings from Last 1 Encounters:   08/12/20 138/84             Passed - Serum Creatinine in past 12 months      Creatinine   Date Value Ref Range Status   08/12/2020 0.77 0.60 - 1.10 mg/dL Final

## 2021-06-13 NOTE — PROGRESS NOTES
Kayleigh presents to [facility] today for a one year follow up appointment with Dr. Hernandez.  She is dong well. She sees Dr. Brown for Medical oncology and admits she is not taking her tamoxifen regularly.  I recommended she see Dr. Brown to discuss this as there may be a different medication for her that Dr. Brown could prescribe. RN assessment and EMR update. Patient met with Dr. Hernandez, see dictation for details of visit and follow up plan.  RN time 15 mins

## 2021-06-13 NOTE — TELEPHONE ENCOUNTER
Patient called, she lmom asking if she was due for a follow up appointment with Dr. Hernandez.  Returned her call.  LMOM for her that yes, she is due for follow up with Dr. Hernandez.  Left Dr. Hernandez's 's number to call back and make an appointment.

## 2021-06-13 NOTE — TELEPHONE ENCOUNTER
Called Kayleigh and ROSEY that writer was calling to follow up and noted that recent clinic visit was cancelled and not rescheduled. Invited calls with any questions or concerns that she may be having and if she needs assistance with rescheduling. Wished her a Happy Thanksgiving and left writer's clinic hours this week.

## 2021-06-13 NOTE — PROGRESS NOTES
This is a 65 y.o. woman who comes in for  continued follow-up of her bilateral breast cancer.  She is now 12 months  status post bilateral total mastectomies with radiation on the left.  He had previously had a lumpectomy on the right.  She is feeling okay.  She did stop taking her tamoxifen because she thought it was causing her blood pressure to go up.      Please see the chart review for PMH, Meds, allergies, FH and SH.    ROS:  A 12 point comprehensive review of systems was negative except as noted.      Physical Exam:  There were no vitals taken for this visit.  General appearance: alert, appears stated age and cooperative  Breasts: There are no palpable masses. There are minimal radiation changes. The scars have healed up well.  Lymph nodes: Cervical, supraclavicular, and axillary nodes normal.  Neurologic: Grossly normal      Impression: Personal History of breast cancer. NOD.  Is overall doing very well.  However, I really encouraged her to get back into see Dr. Brown to talk about the options for hormone therapy.  I recommended that she at least try taking 10 mg a day of the tamoxifen until she gets into see Dr. Brown.  This is not what I recommend for long-term.  I just want her to do something until she can talk about the other options with Dr. Brown.      Plan: Follow up as needed.  Encouraged her to contact Dr. Brown's office to make an appointment and I will also reach out to Dinora her nurse navigator to make sure this happens.

## 2021-06-13 NOTE — TELEPHONE ENCOUNTER
Spoke with Kayleigh to follow up after her surgical clinic visit with Dr. Hernandez. She had received writer's message a couple weeks ago and was also encouraged by Dr. Hernandez to schedule a follow up appointment again with Dr. Brown. Kayleigh shared that she has been feeling overwhelmed and just had not called to reschedule. She relayed that she stopped her endocrine therapy citing that it created intolerable side effects with increased  BP. Stressed the importance of regular follow up appointments with oncology so that she can communicate about her side effects so that therapy/ symptom management issues can be addressed to help her adhere to treatment plan. She was in agreement with this and will call the schedulers today to arrange a follow up. Appointment was subsequently schehduled on 1/14/2020. She had her vit d checked at [facility] last year and was low normal. She also had CBC checked at PCP in 8/2020 and wonders if she can have her labs rechecked when she comes for her follow up. Will send a message to Dr. Brown's service. Support and encouragement was provided and encouraged her to call any time with questions, concerns or support needs. She was appreciative of the call today.

## 2021-06-13 NOTE — PROGRESS NOTES
S: Patient was seen in [place] to be re-measured for a new compression arm sleeve 20-30 mmHg on the left arm. She also wanted to reorder some breast prostheses and bras. RX has been requested from Dr. Hernandez since it has been over a year.    O: Goal is to re-measure and reorder items that need replacing since last December 2019.     A: New measurements were taken for her left arm (she wants to try Medi Comfort 20-30 mmHg this time) so two in size 2, standard were ordered. She also would like to order the same [employer] breast forms as last time, each a size down (so a size 7 and 6). She will call back with info on which bra she would like me to reorder two of this time. RX was requested from Dr. Hernandez and order to [facility] will be on hold until she calls back with her bra information.    P: Items will be shipped out to [facility]'s home once in and ready for delivery. She confirmed her address.

## 2021-06-13 NOTE — TELEPHONE ENCOUNTER
Having a problem with Atenolol.50mg two times a day  Thinks she is allergic to this medication.  Takes it in evening, and she statesthe allergic symptoms remain all day the next day.    She reports having allergy to this med since she began taking it. States her eyes get puffy, and lids stay puffy throughout the day.    She is requesting a virtual apointment to discuss this .    Patient sent to  today    Lawanda Schroeder RN  [facility] Triage/refill nurse  .  Reason for Disposition    Caller has URGENT medication question about med that PCP prescribed and triager unable to answer question    Caller has NON-URGENT medication question about med that PCP prescribed and triager unable to answer question    Protocols used: MEDICATION QUESTION CALL-A-OH

## 2021-06-14 NOTE — PROGRESS NOTES
"Kayleigh Sousa is a 65 y.o. female who is being evaluated via a billable video visit.      How would you like to obtain your AVS? MyChart.  If dropped from the video visit, the video invitation should be resent by: Text to cell phone: [phone]  Will anyone else be joining your video visit? No      Video Start Time: 8:54 AM  Assessment & Plan     Essential hypertension  Still making changes to medications, but overall, we have a plan. She remains asymptomatic from a cardiopulmonary standpoint. Please review instructions below regarding transition from BBlocker for an increase of the losartan.   Patient instructions:  Week 1: take 100 mg of labetalol two times a day. Take 50 mg of losartan daily  Week 2: take 50 mg of labetalol two times a day and take 75 mg mg of losartan daily. If you cannot break it into 50 mg portions, please let us know  Week 3: for goal BP less than 120/80, then can potential increase losartan to 100 mg daily. Probably should have labs done when you at either 75 or 100 mg losartan. Depending on the results, you may not need the K supplement anymore.   Please  stay well hydrated    Anxiety  Referred to psychotherapy. Notes that she will be seeing a psychologist on Monday 1/25/21    Review of external notes as documented above     23 minutes spent on the date of the encounter doing patient visit and documentation      BMI:   Estimated body mass index is 27.62 kg/m  as calculated from the following:    Height as of 1/12/21: 5' 3\" (1.6 m).    Weight as of 1/14/21: 155 lb 14.4 oz (70.7 kg).     Return if symptoms worsen or fail to improve.    Ger Cerrato MD  [facility]   Kayleigh Sousa is 65 y.o. and presents to clinic today for the following health issues   HPI nots that despite change from atenolol to labetolol, she is still having a reaction to it. Notes that sometime when she takes it, she does not feel well, with associated eye puffiness " and congested nasal passages. She is taking the losartan right now and thinks it is going quite well. Notes that last night, when she took her BP , it was 137/78. She is taking 2 tablets of the dyazide. She will be seeing psychotherapy next week.     ROS A comprehensive review of systems was performed and was otherwise negative      Objective    Vitals - Patient Reported  Systolic (Patient Reported): 172  Diastolic (Patient Reported): 102    Physical Exam  General appearance: Pleasant, nontoxic-appearing, no acute distress, alert and oriented x4  There were no vitals filed for this visit.  GENERAL: Healthy, alert and no distress  EYES: Eyes grossly normal to inspection.    RESP: No audible wheeze, cough, or visible cyanosis. No increase WOB  NEURO: Cranial nerves grossly intact. Mentation and speech appropriate for age.  PSYCH: Mentation appears normal, affect normal/bright, judgement and insight intact, normal speech and appearance well-groomed     I spent a total of 24 minutes face-to-face with Kayleigh Sousa during today's office visit.      Video-Visit Details  Type of service:  Video Visit  Video End Time (time video stopped): 9:14 AM  Originating Location (pt. Location): Home  Distant Location (provider location):  [facility]   Platform used for Video Visit: [employer]

## 2021-06-14 NOTE — TELEPHONE ENCOUNTER
losartan (COZAAR) 50 MG tablet 60 tablet 2 2/1/2021  No   Sig - Route: Take 1.5 tablets (75 mg total) by mouth daily. - Oral   Sent to pharmacy as: losartan 50 mg tablet (COZAAR)   E-Prescribing Status: Receipt confirmed by pharmacy (2/1/2021  7:32 AM CST)     Patient instructions:  Week 1: take 100 mg of labetalol two times a day. Take 50 mg of losartan daily  Week 2: take 50 mg of labetalol two times a day and take 75 mg mg of losartan daily. If you cannot break it into 50 mg portions, please let us know  Week 3: for goal BP less than 120/80, then can potential increase losartan to 100 mg daily. Probably should have labs done when you at either 75 or 100 mg losartan. Depending on the results, you may not need the K supplement anymore.   Please  stay well hydrated

## 2021-06-14 NOTE — PATIENT INSTRUCTIONS - HE
In regards to your blood pressure, I think we should transition you OUT of labetolol and replace it with something like losartan or a similar ARB.   I have increased the Dyazide to 2 caposules to be taken in the AM. This will lower your blood pressure. Remember to stay well hydrated.   We will need to recheck your electrolytes and kidney function because of this dosage change. I have placed a future order for labs to be done in roughly 4 weeks.   Continue to check your BP at home, and keep a record of it. If still above 130/80, call us as we will likely need another appointment. At that time, we can discuss transitioning from the labetalol to an ARB

## 2021-06-14 NOTE — TELEPHONE ENCOUNTER
Triage call:    At 1/12/21 provider discussed switching her blood pressure medications.    Recent changes on 1/12/21:   -Dyazide was increased to 2 capsules in the AM.     Patient checks her bp at home.170/96 this morning before taking her blood pressure medications.  Water retention in ankles has improved with dyazide increase.     Pt was advised of protocol recommendation/disposition of be seen within 3 days.   Rn transferred [place] [facility] [place] to assist with making a virtual visit to discuss concerns.     Lois Evans RN 01/14/21 8:32 AM  [facility] Nurse Advisor      COVID 19 Nurse Triage Plan/Patient Instructions    Please be aware that novel coronavirus (COVID-19) may be circulating in the community. If you develop symptoms such as fever, cough, or SOB or if you have concerns about the presence of another infection including coronavirus (COVID-19), please contact your health care provider or visit [email or web address].     Disposition/Instructions    Virtual Visit with provider recommended. Reference Visit Selection Guide.    Thank you for taking steps to prevent the spread of this virus.  o Limit your contact with others.  o Wear a simple mask to cover your cough.  o Wash your hands well and often.    Resources    [facility]: About COVID-19: [email or web address]    CDC: What to Do If You're Sick: www.cdc.gov/coronavirus/2019-ncov/about/steps-when-sick.html    CDC: Ending Home Isolation: www.cdc.gov/coronavirus/2019-ncov/hcp/disposition-in-home-patients.html     CDC: Caring for Someone: www.cdc.gov/coronavirus/2019-ncov/if-you-are-sick/care-for-someone.html     [facility]: Interim Guidance for Hospital Discharge to Home: www.health.[facility].[place]/diseases/coronavirus/hcp/hospdischarge.pdf    [facility] clinical trials (COVID-19 research studies): [email or web address].[email or web address]/umn-[email or web address]-trials     Below are the COVID-19 hotlines at the [place] Department of [facility] ([facility]).  Interpreters are available.   o For health questions: Call 871-732-8342 or 1-477.517.4498 (7 a.m. to 7 p.m.)  o For questions about schools and childcare: Call 802-275-5652 or 1-938.920.2602 (7 a.m. to 7 p.m.)     Reason for Disposition    Systolic BP >= 160 OR Diastolic >= 100    Additional Information    Negative: Pregnant > 20 weeks or postpartum (< 6 weeks after delivery) and new hand or face swelling    Negative: Pregnant > 20 weeks and BP > 140/90    Negative: Sounds like a life-threatening emergency to the triager    Negative: Systolic BP >= 160 OR Diastolic >= 100, and any cardiac or neurologic symptoms (e.g., chest pain, difficulty breathing, unsteady gait, blurred vision)    Negative: Patient sounds very sick or weak to the triager    Negative: BP Systolic BP >= 140 OR Diastolic >= 90 and postpartum (from 0 to 6 weeks after delivery)    Negative: Systolic BP >= 180 OR Diastolic >= 110, and missed most recent dose of blood pressure medication    Negative: Systolic BP >= 180 OR Diastolic >= 110    Negative: Patient wants to be seen    Negative: Ran out of BP medications    Negative: Taking BP medications and feels is having side effects (e.g., impotence, cough, dizziness)    Protocols used: HIGH BLOOD PRESSURE-A-OH

## 2021-06-14 NOTE — TELEPHONE ENCOUNTER
1/18/21:  Refill request received via [employer] for anastrozole. Last refilled on 4/23/20 by Dr. Brown. Quantity #90. 1 refill. Message sent to Dr. Brown/DEBORAH Woo RN    Called patient to clarify is refill needed. Patient had not been taking. Message left for patient to return my call/DEBORAH Woo RN    1/19/21:  Called patient. Patient reports she is not taking anastrozole right now while she is managing her BPs. She will call us when she restarts anastrozole and needs a refill. Dr. Brown updated/DEBORAH Woo RN

## 2021-06-14 NOTE — TELEPHONE ENCOUNTER
Kayleigh reports that her BP has been high since 1/8.  Started feeling lightheaded on 1/8 so she monitored her BP daily:  Jan 9 158/104  Andrés 10 156/99  Jan 11 151/97  Today 184/107, 177/107    Seen on 12/23 by PCP, BP meds:  - labetalol 200 mg two times a day   - Dyazide 37.5-25 mg every day  - amlodipine 10 mg every day    PLAN:  - clinic visit within 3 days  - go to ED if BP >180/100, or with chest pain, shortness of breath  Patient verbalized understanding.  Transferred to     Laurel Ramires RN/[place] Nurse Advisor      Reason for Disposition    Systolic BP >= 160 OR Diastolic >= 100    Additional Information    Negative: Sounds like a life-threatening emergency to the triager    Negative: Pregnant > 20 weeks or postpartum (< 6 weeks after delivery) and new hand or face swelling    Negative: Pregnant > 20 weeks and BP > 140/90    Negative: Systolic BP >= 160 OR Diastolic >= 100, and any cardiac or neurologic symptoms (e.g., chest pain, difficulty breathing, unsteady gait, blurred vision)    Negative: Patient sounds very sick or weak to the triager    Negative: BP Systolic BP >= 140 OR Diastolic >= 90 and postpartum (from 0 to 6 weeks after delivery)    Negative: Systolic BP >= 180 OR Diastolic >= 110, and missed most recent dose of blood pressure medication    Negative: Systolic BP >= 180 OR Diastolic >= 110    Negative: Patient wants to be seen    Negative: Ran out of BP medications    Negative: Taking BP medications and feels is having side effects (e.g., impotence, cough, dizziness)    Protocols used: HIGH BLOOD PRESSURE-A-OH

## 2021-06-14 NOTE — PROGRESS NOTES
"  Kayleigh Sousa is a 65 y.o. female who is being evaluated via a billable video visit.      The patient has been notified of following:     \"This video visit will be conducted via a call between you and your physician/provider. We have found that certain health care needs can be provided without the need for an in-person physical exam.  This service lets us provide the care you need with a video conversation.  If a prescription is necessary we can send it directly to your pharmacy.  If lab work is needed we can place an order for that and you can then stop by our lab to have the test done at a later time.    Video visits are billed at different rates depending on your insurance coverage. Please reach out to your insurance provider with any questions.    If during the course of the call the physician/provider feels a video visit is not appropriate, you will not be charged for this service.\"    Patient has given verbal consent to a Video visit? Yes  How would you like to obtain your AVS? AVS Preference: Mail a copy.  If dropped by the video visit, the video invitation should be sent to: Text to cell phone: [phone]  Will anyone else be joining your video visit? No        Video Start Time: 8:04 AM.  We could establish video contact but there was no sound, so we switched to telephone call    She is having trouble with some facial swelling and itching.  She thinks it occurs about 1 to 2 hours after she takes her atenolol.  No wheezing.  No trouble speaking.  Never any dyspnea.  She has not tolerated ACE inhibitors.  She tried the medication losartan and Benicar in the past without any results.    Discontinue atenolol.  Try taking labetalol 200 mg twice daily for blood pressure.  Continue the use of her calcium channel blocker, amlodipine.  Continue the diuretic.  Final diagnosis hypertension  Probable reaction to atenolol.  Additional provider notes:       Video-Visit Details    Type of service:  Video " Visit    Video End Time (time video stopped): 8:24 AM  Originating Location (pt. Location): Home    Distant Location (provider location):  [facility]     Platform used for Video Visit: [employer] later, switch to telephone call only.      Vadim Whitaker MD
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
## 2021-06-14 NOTE — PROGRESS NOTES
"[facility] Hematology and Oncology Progress Note    Patient: Kayleigh Sousa  MRN: [id number]  Date of Service: 01/14/2021      Reason for Visit    Chief Complaint   Patient presents with     HE Cancer     Breast Cancer       Assessment and Plan  Cancer Staging  Invasive ductal carcinoma of breast, female, left (H)  Staging form: Breast, AJCC 8th Edition  - Clinical: No stage assigned - Unsigned  - Pathologic: Stage IA (pT1c, pN1a, cM0, G2, ER+, WV+, HER2-) - Signed by Jere Brown MD on 12/19/2019      ECOG Performance   ECOG Performance Status: 0     Distress Assessment  Distress Assessment Score: Unable to rate(due to increased BP, worries about everything)    Pain  Currently in Pain: No/denies    #.  Invasive ductal carcinoma of the left breast.  pT1c pN1a cM0. ER strongly positive, WV positive, HER-2 negative.  Grade 2, 15 mm.  2 out of 3 sentinel lymph nodes were positive for metastatic carcinoma with extracapsular extension, and additional 5 lymph nodes were negative for malignancy.  Margins were negative.  Oncotype DX recurrence score 9 with distant recurrence risk at 9-year with endocrine therapy is 12%, no apparent chemotherapy benefit.   She has not been taking adjuvant endocrine therapy as directed due to uncontrolled high blood pressure that attributed to anastrozole.  She has high blood pressure prior to starting anastrozole and her blood pressure persistently elevated even she stopped anastrozole.  She follow-up with her primary care provider earlier today for blood pressure medication adjustment.  She also admitted that she is a \"worrier\" and that also attributed to her high blood pressure.     I advised her to get her blood pressure under control with antihypertensive as indicated.  I explained to her that anastrozole might contribute worsening high blood pressure, however it does not cause hypertension and the side effects is expected to be minimal from anastrozole.  I also refer her to " psychotherapy for stress and anxiety management.  I also explained to her today to follow-up with her primary care provider for potential pharmacologic intervention for anxiety.  I strongly recommended her to resume aromatase inhibitor for adjuvant aspect of breast cancer treatment.    Follow-up with me in 3 months.   No need for surveillance mammogram.    #.  Anxiety/stress   Referred to our psychotherapist.    #.  Prior history of right breast DCIS in 2008, treated with right breast lumpectomy followed by radiation.  Right breast mastectomy in September 2019.    Problem List    1. Invasive ductal carcinoma of breast, female, left (H)     2. Anxiety  Ambulatory referral to Oncology Psychotherapist      ______________________________________________________________________________  Diagnosis  September 2019-   pT1c pN1a cM0 invasive ductal carcinoma of the left breast    grade 2, 15 mm.     ER positive (greater than 95%), FL positive (10%), HER-2/hakeem negative (1+ by IHC)   2 out of 3 sentinel lymph nodes were positive for metastatic carcinoma with extracapsular extension, and additional 5 lymph nodes were negative for malignancy.  Margins were negative.     Oncotype DX recurrence score 9 with distant recurrence risk at 9-year with endocrine therapy is 12%, no apparent chemotherapy benefit.    Treatment to date  9/25/2019-left breast mastectomy and left sentinel lymph node biopsy, right breast mastectomy.  12/27/2019-plan to complete adjuvant radiation to the left breast.  1/2020-started anastrozole.  Admitted that she has not been taking it for several months and took for about a few weeks in total last year due to uncontrolled high blood pressure, lightheadedness and not feeling good on the medication.    History of Present Illness    Kayleigh Victor presents today accompanied by her friend.    She is going to finish radiation next week.  She wants to discuss about adjuvant endocrine therapy which she is planning to  start soon after completion of radiation.  Overall doing well.  No major side effects or radiation.  She wants to know about any additional measures that she need to take for possible tooth extraction.    Pain Status  Currently in Pain: No/denies    Review of Systems    Constitutional  Constitutional (WDL): All constitutional elements are within defined limits  Neurosensory  Neurosensory (WDL): Exceptions to WDL  Peripheral Motor Neuropathy: None  Ataxia: None  Peripheral Sensory Neuropathy: None  Confusion: None  Syncope: None  Eye   Eye Disorder (WDL): Exceptions to WDL  Blurred Vision: None  Dry Eye: Asymptomatic, clinical or diagnostic observations only, mild symptoms relieved by lubricants  Eye Pain: Mild pain(intermittent pain behind R eye)  Watering Eyes: None  Ear  Ear Disorder (WDL): Exceptions to WDL  Ear Pain: None  Tinnitus: Mild symptoms, intervention not indicated(intermittent when BP high)  Cardiovascular  Cardiovascular (WDL): All cardiovascular elements are within defined limits  Pulmonary  Respiratory (WDL): Within Defined Limits  Gastrointestinal  Gastrointestinal (WDL): Exceptions to WDL  Anorexia: None  Constipation: None(occ)  Diarrhea: None(occ)  Dysphagia: None  Esophagitis: Asymptomatic, clinical or diagnostic observations only, intervention not indicated(occ)  Nausea: Loss of appetite without alteration in eating habits  Pharyngitis: None  Vomiting: None  Dysgeusia: None  Dry Mouth: None  Genitourinary  Genitourinary (WDL): All genitourinary elements are within defined limits  Lymphatic  Lymph (WDL): Exceptions to WDL  Musculoskeletal and Connective Tissue  Musculoskeletal and Connetive Tissue Disorders (WDL): All Musculoskeletal and Connetive Tissue Disorder elements are within defined limits  Integumentary  Integumentary (WDL): All integumentary elements are within defined limits  Patient Coping  Patient Coping: Accepting;Open/discussion  Distress Assessment  Distress Assessment Score:  Unable to rate(due to increased BP, worries about everything)  Accompanied by  Accompanied by: Alone  Oral Chemo Adherence         Past History  Past Medical History:   Diagnosis Date     Leukopenia     mild-3100 wbc     PAC (premature atrial contraction)        Physical Exam    Recent Vitals 1/14/2021   Height -   Weight 155 lbs 14 oz   BSA (m2) 1.77 m2   /76   Pulse 61   Temp (No Data)   Temp src -   SpO2 98   Some recent data might be hidden     General: alert, awake, not in acute distress  HEENT: Head: Normal, normocephalic, atraumatic.  Eye: Normal external eye, conjunctiva, lids cornea, JAN.  Ears:  Non-tender.  Nose: Normal external nose, mucus membranes and septum.  Pharynx: Dental Hygiene adequate. Normal buccal mucosa. Normal pharynx.  Neck / Thyroid: Supple, no masses, nodes, nodules or enlargement.  Lymphatics: No abnormally enlarged lymph nodes.  Chest: Normal chest wall and respirations. Clear to auscultation.  Breasts: Surgically absent breast bilaterally.  No palpable abnormalities.  Heart: S1 S2 RRR, no murmur.   Abdomen: abdomen is soft without significant tenderness, masses, organomegaly or guarding  Extremities: normal strength, tone, and muscle mass  Skin: normal. no rash or abnormalities  CNS: non focal.      Lab Results    No results found for this or any previous visit (from the past 168 hour(s)).    Imaging    No results found.    Signed by: Jere Brown MD

## 2021-06-14 NOTE — PROGRESS NOTES
"  Assessment & Plan     Essential hypertension  Overweight (BMI 25.0-29.9)  Reviewed home care remedies to improve her blood pressure with weight loss really being the only obvious one.  Counseled how her relative bradycardia secondary to labetalol but it itself is not an ideal antihypertensive.  Patient hesitant to take lisinopril due to some concern about breast cancer in the setting of a history of recurrent breast cancer in 2019.  Will increase Dyazide to 2 capsules daily and counseled her to please stay well-hydrated.  Will recheck BMP in 4 weeks in the future lab check.  She will check her blood pressure periodically to monitor for goal less than 130/80, and if she does not meet this goal, we can likely start on an ARB as we transition her off the labetalol.  Highlighted with her how improvement of her blood pressure will overall reduce her 10-year ASCVD event risk down to the single digits (optimal 4.9%)  - triamterene-hydrochlorothiazide (DYAZIDE) 37.5-25 mg per capsule  Dispense: 135 capsule; Refill: 2  - Basic Metabolic Panel    The 10-year ASCVD risk score (Geovany DC Jr., et al., 2013) is: 11.7%    Values used to calculate the score:      Age: 65 years      Sex: Female      Is Non- : Yes      Diabetic: No      Tobacco smoker: No      Systolic Blood Pressure: 150 mmHg      Is BP treated: Yes      HDL Cholesterol: 61 mg/dL      Total Cholesterol: 180 mg/dL      25 minutes spent on the date of the encounter doing chart review, history and exam, documentation and further activities as noted above         BMI:   Estimated body mass index is 27.97 kg/m  as calculated from the following:    Height as of this encounter: 5' 3\" (1.6 m).    Weight as of this encounter: 157 lb 14.4 oz (71.6 kg).     Return in about 4 weeks (around 2/9/2021), or lab check, for Also, 7 month follow-up for hypertension.    Ger Cerrato MD  [facility] " "Lars is 65 y.o. and presents to clinic today for the following health issues   HPI Ms. Sousa presents to follow-up on her blood pressure.  Currently managed with amlodipine, triamterene-HCTZ and labetalol.  Has been monitoring her BP at home:  1/8/2021 174/102  1/9/2021 148/104  1/10/2021 156/99  1/11/2021 151/97  She denies drinking any caffeine, is a lifetime non-smoker and is trying to limit/avoid her salt intake.  Endorses some stress this year in the setting of her mother having a stroke and the summer 2020.  Notes that her blood pressure is high when she feels fatigued and/or she is has difficulty sleeping.  Notes on 1/8/2021 when she woke up feeling lightheaded and woozy and when she checked her blood pressure it was high.  Denies any angina/chest pain, palpitations, fever, sweats or chills, arm pain or diaphoresis.  CMP from 8/12/2020 unremarkable    Review of Systems  ROS A comprehensive review of systems was performed and was otherwise negative      Objective    BP (!) 150/96 (Patient Site: Right Arm, Patient Position: Sitting, Cuff Size: Adult Regular)   Pulse 62   Temp 97.5  F (36.4  C) (Tympanic)   Resp 18   Ht 5' 3\" (1.6 m)   Wt 157 lb 14.4 oz (71.6 kg)   SpO2 98%   BMI 27.97 kg/m    Body mass index is 27.97 kg/m .  Physical Exam  GENERAL APPEARANCE: Pleasant, nontoxic-appearing, no acute distress   HEENT: Head normal. Hearing was normal to voice.    RESPIRATORY: Bilaterally with no crackles, wheezing or rhonchi  CARDIOVASCULAR: Regular S1 and S2.  Radial pulses intact.  No edema.  GASTROINTESTINAL: NABS, soft, NT, ND, no HSM  MUSCULOSKELETAL: Skeletal configuration was normal and muscle mass was normal for age.  SKIN/HAIR/NAILS: Skin color was normal. Hair normal.  NEUROLOGIC: Alert and oriented to person, place, time, and circumstance. Speech was normal.     I spent a total of 25 minutes face-to-face with Kayleigh Sousa during today's office visit.  Over 50% of this time was spent " counseling the patient and/or coordinating care regarding blood pressure management and future lab monitoring.  See note for details.

## 2021-06-14 NOTE — PROGRESS NOTES
"Kayleigh Sousa is a 65 y.o. female who is being evaluated via a billable telephone visit.      What phone number would you like to be contacted at? [phone]   How would you like to obtain your AVS? AVS Preference: Mail a copy.  Assessment & Plan     Essential hypertension  Suspect there is a component of stress/anxiety at play with these periodic episodes of elevated blood pressure in the setting of insomnia.  Counseled patient at length regarding the likelihood that her symptoms are secondary to unstable angina/ACS.  Also unsure of the significant benefit of labetalol for blood pressure management.  Hypertension has been a longstanding issue for Ms. Sousa. She is amenable to trial of an ARB in light of her concerns about ACE inhibitor's and recurrent breast cancer with lisinopril use.  I gave her the name of losartan as a drug I would consider starting her on, ideally low-dose and 25 mg with the hope of transitioning her off the labetalol as I increase the losartan dose.  She will try to set up [employer] [facility] and let us know what she feels about starting the losartan.  Alternatively, could transition off the labetalol for Toprol-XL/carvedilol. Did consider starting her on hydroxyzine at night to help with anxiety to help her with sleep but deferred from doing his at this time.  Awaiting to hear back from her via [employer].    25 minutes spent on the date of the encounter doing chart review, history and exam, documentation and further activities as noted above     BMI:   Estimated body mass index is 27.97 kg/m  as calculated from the following:    Height as of 1/12/21: 5' 3\" (1.6 m).    Weight as of 1/12/21: 157 lb 14.4 oz (71.6 kg).     No follow-ups on file.    Ger Cerrato MD  [facility]     Kayleigh Sousa is 65 y.o. and presents to clinic today for the following health issues   HPI Notes that once again she could not sleep and then woke up with her blood pressure " elevated. States that her SBP was 170/96 in the morning.  When on the phone, BP went down to 147/102.  In regards to medication administration, she takes 2 tablets of the Dyazide at the labetalol in the a.m.  Takes amlodipine in the midday and then a second dose of labetalol in the evening. Notes that the ankle tightness that she had during the appt has resolved. Hr in the 50s.  Denies angina, chest pain, shortness of breath or palpitation or diaphoresis but does endorse some indigestion.    Review of Systems  ROS A limited review of systems was performed and was otherwise negative      Objective       Vitals:  No vitals were obtained today due to virtual visit.    Physical Exam  Unable to do due to telephone visit    I spent a total of 24 minutes face-to-face with Kayleigh Sousa during today's office visit.  Over 50% of this time was spent counseling the patient and/or coordinating care regarding blood pressure management.  See note for details.      Phone call duration: 21 minutes

## 2021-06-16 NOTE — TELEPHONE ENCOUNTER
Called Kayleigh and ROSEY that writer calling to check in and noted financial change and need to transfer care. Contact information provided if any questions or concerns arise.

## 2021-06-17 NOTE — PATIENT INSTRUCTIONS - HE
Patient Instructions by Jere Brown MD at 12/19/2019 11:15 AM     Author: Jere Brown MD Service: -- Author Type: Physician    Filed: 12/19/2019 11:56 AM Encounter Date: 12/19/2019 Status: Addendum    : Jere Brown MD (Physician)    Related Notes: Original Note by Jere Brown MD (Physician) filed at 12/19/2019 11:51 AM         Calcium 5194-4733 mg daily in 2 divided dose.  Vitamin D 800-1000 units daily in 2 divided dose.    Anastrozole tablets  Brand Name: Arimidex  What is this medicine?  ANASTROZOLE (an AS troe zole) is used to treat breast cancer in women who have gone through menopause. Some types of breast cancer depend on estrogen to grow, and this medicine can stop tumor growth by blocking estrogen production.  How should I use this medicine?  Take this medicine by mouth with a glass of water. Follow the directions on the prescription label. You can take this medicine with or without food. Take your doses at regular intervals. Do not take your medicine more often than directed. Do not stop taking except on the advice of your doctor or health care professional.  Talk to your pediatrician regarding the use of this medicine in children. Special care may be needed.  What side effects may I notice from receiving this medicine?  Side effects that you should report to your doctor or health care professional as soon as possible:    allergic reactions like skin rash, itching or hives, swelling of the face, lips, or tongue    any new or unusual symptoms    breathing problems    chest pain    leg pain or swelling    vomiting  Side effects that usually do not require medical attention (report to your doctor or health care professional if they continue or are bothersome):    back or bone pain    cough, or throat infection    diarrhea or constipation    dizziness    headache    hot flashes    loss of appetite    nausea    sweating    weakness and tiredness    weight gain  What may  interact with this medicine?  Do not take this medicine with any of the following medications:    female hormones, like estrogens or progestins and birth control pills  This medicine may also interact with the following medications:    tamoxifen  What if I miss a dose?  If you miss a dose, take it as soon as you can. If it is almost time for your next dose, take only that dose. Do not take double or extra doses.  Where should I keep my medicine?  Keep out of the reach of children.  Store at room temperature between 20 and 25 degrees C (68 and 77 degrees F). Throw away any unused medicine after the expiration date.  What should I tell my health care provider before I take this medicine?  They need to know if you have any of these conditions:    liver disease    an unusual or allergic reaction to anastrozole, other medicines, foods, dyes, or preservatives    pregnant or trying to get pregnant    breast-feeding  What should I watch for while using this medicine?  Visit your doctor or health care professional for regular checks on your progress. Let your doctor or health care professional know about any unusual vaginal bleeding.  Do not treat yourself for diarrhea, nausea, vomiting or other side effects. Ask your doctor or health care professional for advice.  NOTE:This sheet is a summary. It may not cover all possible information. If you have questions about this medicine, talk to your doctor, pharmacist, or health care provider. Copyright  2018 [employer]

## 2021-06-18 ENCOUNTER — TRANSCRIBE ORDERS (OUTPATIENT)
Dept: OTHER | Age: 66
End: 2021-06-18

## 2021-06-18 ENCOUNTER — PRE VISIT (OUTPATIENT)
Dept: OTHER | Age: 66
End: 2021-06-18

## 2021-06-18 DIAGNOSIS — Z85.3 PERSONAL HISTORY OF MALIGNANT NEOPLASM OF BREAST: Primary | ICD-10-CM

## 2021-06-18 NOTE — PROGRESS NOTES
Office Visit - Physical   Kayleigh Sousa   62 y.o.  female in for annual physical examination.  She still works full-time though she may be retiring in the next year or 2.  She is fairly active.  She is rarely sick.  Only minor viral infections the past year.    Date of visit: 6/25/2018  Physician: Vadim Whitaker MD     Assessment and Plan     1. Routine general medical examination at a health care facility      2. Essential hypertension  His blood pressure 132/86.  Medications will be continued    3. Hx of adenomatous colonic polyps  Due for colonoscopy next year.    4. History of breast cancer  Breast cancer to the [facility] 8.  No recurrence.  Gets mammograms yearly    5. Medication management  No trouble with medication    6. Vitamin D deficiency  Currently does take vitamin D.  We will recheck her level.  2 years ago was under 17.      The following high BMI interventions were performed this visit: encouragement to exercise    No Follow-up on file.     Chief Complaint   Chief Complaint   Patient presents with     Annual Exam     fasting        Patient Profile   Social History     Social History Narrative    , 2 children        Past Medical History   Patient Active Problem List   Diagnosis     Essential hypertension     History of breast cancer     Hx of adenomatous colonic polyps       Past Surgical History  She has a past surgical history that includes Cyst Removal (Right); Breast lumpectomy (Right, 2008); and Appendectomy (2011).     History of Present Illness   This 62 y.o. old female in for annual physical exam.  She is doing well.  No chest pain with exertion orthopnea  No cough wheezing or asthma  No dysphasia.  No change in bowel habits.  Next colonoscopy 1 year.  Nocturia ×1.  No hematuria.  One episode of kidney stones 10 years ago.  No diabetes but her mother and sister happened.  She denies any polyuria.  No history of thyroid disorder.  No musculoskeletal complaints.  No epilepsy.  No  "migraine no neuropathy.  No TIA-like symptoms.    Review of Systems: A comprehensive review of systems was negative except as noted.     Medications and Allergies   Current Outpatient Prescriptions   Medication Sig Dispense Refill     amLODIPine (NORVASC) 10 MG tablet Take 1 tablet (10 mg total) by mouth daily. 90 tablet 4     aspirin 81 mg chewable tablet Chew.       lisinopril (PRINIVIL,ZESTRIL) 20 MG tablet TAKE ONE TABLET BY MOUTH ONE TIME DAILY 90 tablet 4     potassium chloride SA (K-DUR,KLOR-CON) 20 MEQ tablet Take 1 tablet (20 mEq total) by mouth daily. 90 tablet 1     No current facility-administered medications for this visit.      Allergies   Allergen Reactions     D And C Red No.40 Unknown     Fd And C No.5 (Tartrazine) Unknown     Fd And C Yellow No.6 (Sunset Yellow Fcf) Rash        Family and Social History   Family History   Problem Relation Age of Onset     Hypertension Mother      Hypertension Father      Heart attack Father         Social History   Substance Use Topics     Smoking status: Never Smoker     Smokeless tobacco: Never Used     Alcohol use None        Physical Exam   General Appearance:   Healthy-appearing woman.  Slightly overweight.  Blood pressure 132/85.  Pulse 80.    Pulse 83  Ht 5' 2.75\" (1.594 m)  Wt 166 lb (75.3 kg)  SpO2 98%  BMI 29.64 kg/m2    EYES: Eyelids, conjunctiva, and sclera were normal. Pupils were normal. Cornea, iris, and lens were normal bilaterally.  HEAD, EARS, NOSE, MOUTH, AND THROAT: Head and face were normal. Hearing was normal to voice and the ears were normal to external exam. Nose appearance was normal and there was no discharge. Oropharynx was normal.  NECK: Neck appearance was normal. There were no neck masses and the thyroid was not enlarged.  No bruits.  RESPIRATORY: Breathing pattern was normal and the chest moved symmetrically.  Percussion/auscultatory percussion was normal.  Lung sounds were normal and there were no abnormal " sounds.  CARDIOVASCULAR: Heart rate and rhythm were normal.  S1 and S2 were normal and there were no extra sounds or murmurs. Peripheral pulses in arms and legs were normal.  Jugular venous pressure was normal.  There was no peripheral edema.  GASTROINTESTINAL: The abdomen was normal in contour.  Bowel sounds were present.  Percussion detected no organ enlargement or tenderness.  Palpation detected no tenderness, mass, or enlarged organs.   MUSCULOSKELETAL: Skeletal configuration was normal and muscle mass was normal for age. Joint appearance was overall normal.  LYMPHATIC: There were no enlarged nodes.  SKIN/HAIR/NAILS: Skin color was normal.  There were no skin lesions.  Hair and nails were normal.  NEUROLOGIC: The patient was alert and oriented to person, place, time, and circumstance. Speech was normal. Cranial nerves were normal. Motor strength was normal for age. The patient was normally coordinated.  PSYCHIATRIC:  Mood and affect were normal and the patient had normal recent and remote memory. The patient's judgment and insight were normal.    ADDITIONAL VITAL SIGNS: Oxygen saturation 98%  CHEST WALL/BREASTS: Biopsy scar right breast.  No palpable masses.  RECTAL: Not checked  GENITAL/URINARY: Gynecology.     Additional Information        Vadim Whitaker MD  Internal Medicine  Contact me at [phone]

## 2021-06-18 NOTE — TELEPHONE ENCOUNTER
RECORDS STATUS - BREAST    RECORDS REQUESTED FROM: History of breast cancer in 2019 radiation & chemo done at [facility]   DATE REQUESTED: 6.18.21   NOTES DETAILS STATUS   OFFICE NOTE from referring provider     OFFICE NOTE from medical oncologist [facility] Most recent: 1.26.21 Linda Waller   OFFICE NOTE from surgeon [facility] Most recent: 9.25.19 Beba Hernandez   OFFICE NOTE from radiation oncologist [facility] Most recent: 2.19.20 Yanna Thompson   DISCHARGE SUMMARY from hospital [facility]    DISCHARGE REPORT from the ER     OPERATIVE REPORT [facility] 9.25.19 Mastectomy bilateral      MEDICATION LIST     CLINICAL TRIAL TREATMENTS TO DATE     LABS     PATHOLOGY REPORTS  (Tissue diagnosis, Stage, ER/TN percentage positive and intensity of staining, HER2 IHC, FISH, and all biopsies from breast and any distant metastasis)                 [facility] 8.23.19 Specimen: Breast, Left   GENONOMIC TESTING     TYPE:   (Next Generation Sequencing, including Foundation One testing, and Oncotype score)     IMAGING (NEED IMAGES & REPORT)     CT SCANS     MRI     MAMMO In Process - [facility]    ULTRASOUND In Process - [facility]    PET In Process - [facility] 10.17.19 NM PET CT, [facility]   BONE SCAN     BRAIN MRI       Action 6.18.21 2:15 PM JO   Action Taken Requested  imaging  [phone]

## 2021-06-19 NOTE — LETTER
Letter by Vadim Whitaker MD at      Author: Vadim Whitaker MD Service: -- Author Type: --    Filed:  Encounter Date: 9/24/2019 Status: Signed         Kayleigh Sousa  [address] [place] [address]             September 24, 2019         Dear Ms. Sousa,    Below are the results from your recent visit:    Resulted Orders   HM2(CBC w/o Differential)   Result Value Ref Range    WBC 3.5 (L) 4.0 - 11.0 thou/uL    RBC 5.39 3.80 - 5.40 mill/uL    Hemoglobin 16.0 12.0 - 16.0 g/dL    Hematocrit 48.2 (H) 35.0 - 47.0 %    MCV 89 80 - 100 fL    MCH 29.6 27.0 - 34.0 pg    MCHC 33.1 32.0 - 36.0 g/dL    RDW 13.2 11.0 - 14.5 %    Platelets 261 140 - 440 thou/uL    MPV 7.7 7.0 - 10.0 fL   Basic Metabolic Panel   Result Value Ref Range    Sodium 142 136 - 145 mmol/L    Potassium 3.6 3.5 - 5.0 mmol/L    Chloride 105 98 - 107 mmol/L    CO2 28 22 - 31 mmol/L    Anion Gap, Calculation 9 5 - 18 mmol/L    Glucose 113 70 - 125 mg/dL    Calcium 10.1 8.5 - 10.5 mg/dL    BUN 16 8 - 22 mg/dL    Creatinine 0.94 0.60 - 1.10 mg/dL    GFR MDRD Af Amer >60 >60 mL/min/1.73m2    GFR MDRD Non Af Amer 60 (L) >60 mL/min/1.73m2    Narrative    Fasting Glucose reference range is 70-99 mg/dL per  American Diabetes Association (ADA) guidelines.       Kayleigh, recent labs are reviewed.  Potassium is normal at 3.6.  You should continue potassium replacement as you were before.  Your kidney functions are normal.  Hemoglobin was normal at 16.0.    Please call with questions or contact us using [employer].    Sincerely,        Electronically signed by Vadim Whitaker MD

## 2021-06-19 NOTE — LETTER
Letter by Vadim Whitaker MD at      Author: Vadim Whitaker MD Service: -- Author Type: --    Filed:  Encounter Date: 6/18/2019 Status: (Other)         Kayleigh Sousa  [address] [place] [address]             June 18, 2019         Dear Ms. Sousa,    Below are the results from your recent visit:    Resulted Orders   Comprehensive Metabolic Panel   Result Value Ref Range    Sodium 141 136 - 145 mmol/L    Potassium 3.2 (L) 3.5 - 5.0 mmol/L    Chloride 102 98 - 107 mmol/L    CO2 27 22 - 31 mmol/L    Anion Gap, Calculation 12 5 - 18 mmol/L    Glucose 114 70 - 125 mg/dL    BUN 13 8 - 22 mg/dL    Creatinine 0.84 0.60 - 1.10 mg/dL    GFR MDRD Af Amer >60 >60 mL/min/1.73m2    GFR MDRD Non Af Amer >60 >60 mL/min/1.73m2    Bilirubin, Total 0.7 0.0 - 1.0 mg/dL    Calcium 10.1 8.5 - 10.5 mg/dL    Protein, Total 7.9 6.0 - 8.0 g/dL    Albumin 4.2 3.5 - 5.0 g/dL    Alkaline Phosphatase 107 45 - 120 U/L    AST 20 0 - 40 U/L    ALT 22 0 - 45 U/L    Narrative    Fasting Glucose reference range is 70-99 mg/dL per  American Diabetes Association (ADA) guidelines.   Lipid Cascade   Result Value Ref Range    Cholesterol 208 (H) <=199 mg/dL    Triglycerides 115 <=149 mg/dL    HDL Cholesterol 71 >=50 mg/dL    LDL Calculated 114 <=129 mg/dL    Patient Fasting > 8hrs? Yes    Urinalysis-UC if Indicated   Result Value Ref Range    Color, UA Yellow Colorless, Yellow, Straw, Light Yellow    Clarity, UA Clear Clear    Glucose, UA Negative Negative    Bilirubin, UA Negative Negative    Ketones, UA Negative Negative    Specific Gravity, UA 1.015 1.005 - 1.030    Blood, UA Trace (!) Negative    pH, UA 6.5 5.0 - 8.0    Protein, UA Negative Negative mg/dL    Urobilinogen, UA 0.2 E.U./dL 0.2 E.U./dL, 1.0 E.U./dL    Nitrite, UA Negative Negative    Leukocytes, UA Negative Negative    Bacteria, UA None Seen None Seen hpf    RBC, UA None Seen None Seen, 0-2 hpf    WBC, UA None Seen None Seen, 0-5 hpf    Squam Epithel, UA 0-5 None Seen, 0-5  lpf    Narrative    UC not indicated   HM1 (CBC with Diff)   Result Value Ref Range    WBC 3.5 (L) 4.0 - 11.0 thou/uL    RBC 5.41 (H) 3.80 - 5.40 mill/uL    Hemoglobin 15.6 12.0 - 16.0 g/dL    Hematocrit 48.8 (H) 35.0 - 47.0 %    MCV 90 80 - 100 fL    MCH 28.8 27.0 - 34.0 pg    MCHC 32.0 32.0 - 36.0 g/dL    RDW 13.4 11.0 - 14.5 %    Platelets 280 140 - 440 thou/uL    MPV 9.6 8.5 - 12.5 fL    Neutrophils % 53 50 - 70 %    Lymphocytes % 36 20 - 40 %    Monocytes % 7 2 - 10 %    Eosinophils % 2 0 - 6 %    Basophils % 2 0 - 2 %    Neutrophils Absolute 1.8 (L) 2.0 - 7.7 thou/uL    Lymphocytes Absolute 1.2 0.8 - 4.4 thou/uL    Monocytes Absolute 0.3 0.0 - 0.9 thou/uL    Eosinophils Absolute 0.1 0.0 - 0.4 thou/uL    Basophils Absolute 0.1 0.0 - 0.2 thou/uL   Thyroid Stimulating Hormone (TSH)   Result Value Ref Range    TSH 0.93 0.30 - 5.00 uIU/mL       Kayleigh, recent labs are reviewed.  Electrolytes show slightly low potassium at 3.2.  Kidney functions and liver function tests as well as blood sugar, are normal.  You can raise that potassium level by eating some high potassium foods for a few days.  I would recommend bananas, cantaloupe and broccoli.  TSH, a measure of thyroid function, is in normal range.  Lipid levels look okay.  HDL cholesterol, the good variety, is very good at 71.  The urine analysis looked okay without signs of any infection.  Hemoglobin was excellent at 15.6.    Please call with questions or contact us using MyChart.    Sincerely,        Electronically signed by Vadim Whitaker MD

## 2021-06-19 NOTE — LETTER
Letter by Dinora Larose RN at      Author: Dinora Larose RN Service: -- Author Type: --    Filed:  Encounter Date: 10/8/2019 Status: Signed       Dear Kayleigh,    Thank you for choosing [facility] for your care.  We are committed to providing you with the highest quality and compassionate healthcare services.  The following information pertains to your first appointment with our clinic.    Date/Time of appointment:  Tuesday October 15, check in at 1045      Name of your Physician:  Jere Brown MD    What to bring to your appointment:    Completed Patient History/Initial Nursing Assessment and Medication/Allergy List (these forms were sent to you).    Any paperwork or films from your physician that we have asked you to bring.    Your current insurance card(s).    Parking:    Please refer to the map included to direct you to [facility].    You can park in any visitor/patient parking area you choose. There is no charge for parking at [facility].     Enter the hospital at the front/main entrance.      Please check in with our  representatives who will escort you to the clinic located in [address] of the [facility].    We hope these instructions are helpful to you.  If you have any questions or concerns, please call us at [phone].  It is our pleasure to assist you.    Warm Regards,  Dinora Larose RN OCN  Nurse Navigator  [phone]

## 2021-06-19 NOTE — LETTER
Letter by Dinora Larose RN at      Author: Dinora Larose RN Service: -- Author Type: --    Filed:  Encounter Date: 10/8/2019 Status: Signed       Dear crystal Walker    Thank you for choosing [facility] for your care.  We are committed to providing you with the highest quality and compassionate healthcare services.  The following information pertains to your first appointment with our clinic.    Date/Time of appointment:  Wednesday, October 23, check in at 9:00 a.m.    Name of your Physician: Dr. Yanna Thompson    What to bring to your appointment:    Completed Patient History/Initial Nursing Assessment and Medication/Allergy List (these forms were sent to you).    Any paperwork or films from your physician that we have asked you to bring.    Your current insurance card(s).    Parking:    Please refer to the map included to direct you to [facility].    The Radiation Oncology parking lot is west of the main entrance, this is free parking and is right next to the Cancer Care Entrance.    Come in the Cancer Care Entrance and check in.        We hope these instructions are helpful to you.  If you have any questions or concerns, please call us at [phone].  It is our pleasure to assist you.    Warm Regards,  Dinora Larose RN, OCN  Nurse Navigator  [phone]

## 2021-06-20 NOTE — LETTER
Letter by Jere Brown MD at      Author: Jere Brown MD Service: -- Author Type: --    Filed:  Encounter Date: 2/13/2020 Status: (Other)                   Office Hours: Monday - Friday 8:00 - 4:30PM    Kayleigh Sousa  [address] [place] [address]           February 13, 2020      Dear Kayleigh:    It looks as though you are due to see Dr. Brown in March. If you would like to schedule this follow up please call [phone]       Sincerely,        [facility]

## 2021-06-20 NOTE — LETTER
Letter by Vadim Whitaker MD at      Author: Vadim Whitaker MD Service: -- Author Type: --    Filed:  Encounter Date: 8/13/2020 Status: (Other)         Kayleigh Sousa  [address] [place] [address]             August 13, 2020         Dear Ms. Sousa,    Below are the results from your recent visit:    Resulted Orders   Comprehensive Metabolic Panel   Result Value Ref Range    Sodium 143 136 - 145 mmol/L    Potassium 4.0 3.5 - 5.0 mmol/L    Chloride 106 98 - 107 mmol/L    CO2 29 22 - 31 mmol/L    Anion Gap, Calculation 8 5 - 18 mmol/L    Glucose 70 70 - 125 mg/dL    BUN 12 8 - 22 mg/dL    Creatinine 0.77 0.60 - 1.10 mg/dL    GFR MDRD Af Amer >60 >60 mL/min/1.73m2    GFR MDRD Non Af Amer >60 >60 mL/min/1.73m2    Bilirubin, Total 0.6 0.0 - 1.0 mg/dL    Calcium 9.5 8.5 - 10.5 mg/dL    Protein, Total 7.2 6.0 - 8.0 g/dL    Albumin 3.9 3.5 - 5.0 g/dL    Alkaline Phosphatase 127 (H) 45 - 120 U/L    AST 16 0 - 40 U/L    ALT 19 0 - 45 U/L    Narrative    Fasting Glucose reference range is 70-99 mg/dL per  American Diabetes Association (ADA) guidelines.   Lipid Cascade   Result Value Ref Range    Cholesterol 180 <=199 mg/dL    Triglycerides 192 (H) <=149 mg/dL    HDL Cholesterol 61 >=50 mg/dL    LDL Calculated 81 <=129 mg/dL    Patient Fasting > 8hrs? No    Urinalysis-UC if Indicated   Result Value Ref Range    Color, UA Yellow Colorless, Yellow, Straw, Light Yellow    Clarity, UA Clear Clear    Glucose, UA Negative Negative    Bilirubin, UA Negative Negative    Ketones, UA Negative Negative    Specific Gravity, UA 1.020 1.005 - 1.030    Blood, UA Negative Negative    pH, UA 7.0 5.0 - 8.0    Protein, UA Negative Negative mg/dL    Urobilinogen, UA 0.2 E.U./dL 0.2 E.U./dL, 1.0 E.U./dL    Nitrite, UA Negative Negative    Leukocytes, UA Negative Negative    Narrative    Microscopic not indicated  UC not indicated   HM1 (CBC with Diff)   Result Value Ref Range    WBC 2.5 (L) 4.0 - 11.0 thou/uL    RBC 5.18 3.80 - 5.40  mill/uL    Hemoglobin 15.5 12.0 - 16.0 g/dL    Hematocrit 47.2 (H) 35.0 - 47.0 %    MCV 91 80 - 100 fL    MCH 29.9 27.0 - 34.0 pg    MCHC 32.8 32.0 - 36.0 g/dL    RDW 13.1 11.0 - 14.5 %    Platelets 235 140 - 440 thou/uL    MPV 7.9 7.0 - 10.0 fL    Neutrophils % 56 50 - 70 %    Lymphocytes % 32 20 - 40 %    Monocytes % 7 2 - 10 %    Eosinophils % 5 0 - 6 %    Basophils % 1 0 - 2 %    Neutrophils Absolute 1.4 (L) 2.0 - 7.7 thou/uL    Lymphocytes Absolute 0.8 0.8 - 4.4 thou/uL    Monocytes Absolute 0.2 0.0 - 0.9 thou/uL    Eosinophils Absolute 0.1 0.0 - 0.4 thou/uL    Basophils Absolute 0.0 0.0 - 0.2 thou/uL   Hepatitis C Antibody (Anti-HCV)   Result Value Ref Range    Hepatitis C Ab Negative Negative       Kayleigh, Recent labs are reviewed.  I screened you for hepatitis C, with no evidence of previous hepatitis infection.  Electrolytes blood sugar, kidney functions, and liver function tests, basically all looked okay.  The urine analysis was normal without signs of infection.  Lipids showed a slightly elevated triglyceride level.  Cholesterol looked okay.  No major changes in diet are needed here.  Continue to exercise as much as possible  Your CBC showed a low white blood.  This may be related to cancer treatments.  That should be rechecked in the next 2 to 3 months.  Overall, you had a very good exam.  I wish you good health going forward.  It has always been a pleasure to visit with you.  Please call with questions or contact us using [employer].    Sincerely,        Electronically signed by Vadim Whitaker MD

## 2021-06-20 NOTE — LETTER
Letter by Jere Brown MD at      Author: Jere Brown MD Service: -- Author Type: --    Filed:  Encounter Date: 1/24/2020 Status: (Other)                   Office Hours: Monday - Friday 8:00 - 4:30PM    Kayleigh Sousa  [address]  [place] [address]           January 24, 2020      Dear Kayleigh:    It looks as though you are due to see Dr. Brown in March. If you would like to schedule a follow up please call [phone]         Sincerely,        [facility]

## 2021-06-22 NOTE — TELEPHONE ENCOUNTER
RECORDS STATUS - BREAST    RECORDS REQUESTED FROM: [facility]/ [facility]/ [facility] /[facility] Radiology   DATE REQUESTED: 7/1/2021   NOTES DETAILS STATUS   OFFICE NOTE from referring provider     OFFICE NOTE from medical oncologist Complete 1/14/2021 Office Visit- Invasive Ductal Carcinoma of breast, female, left (H)     10/29/2020 Malignant Neoplasm of upper-outer quadrant of left breast in female     7/30/2020 Malignant Neoplasm of upper-outer quadrant of left breast    OFFICE NOTE from surgeon Complete 9/25/2019 CE- Bilateral Mastectomies with left sentinel lymph node biopsy    OFFICE NOTE from radiation oncologist     DISCHARGE SUMMARY from hospital Complete 9/25/2019 Malignant Neoplasm of upper-outer quadrant of left breast in female    DISCHARGE REPORT from the ER     OPERATIVE REPORT Complete See Biopsy Reports in CE    MEDICATION LIST Complete [facility]   CLINICAL TRIAL TREATMENTS TO DATE     LABS     PATHOLOGY REPORTS  (Tissue diagnosis, Stage, ER/HI percentage positive and intensity of staining, HER2 IHC, FISH, and all biopsies from breast and any distant metastasis)                 Received Bx on 6/24/2021 2:14pm [facility]   [employer] tracking number:  [id number]   Ph: [phone]  Fax: [phone]  [employer] Tracking Number:  [id number]   Received Bx on 6/30/2021- [facility]  [employer] Tracking Number:  [id number] [facility], [address] [place] [address], [facility]  [address] [address]  Breast Biopsy 10/9/2019   CASE: [id number]    8/23/2019 [facility]   Case: [id number] Breast Biopsy      GENONOMIC TESTING     TYPE:   (Next Generation Sequencing, including Foundation One testing, and Oncotype score)     IMAGING (NEED IMAGES & REPORT)     CT SCANS     MRI     NM Lymph Complete 9/25/2019    MAMMO Complete    Complete- [facility]  MA External Imaging 3D Nilread 9/10/2019     MA Breast Specimen 11/20/2008    [facility]- Xray Mammo 3/28/2012   ULTRASOUND      PET Complete- [facility]  PET Oncology (Eyes to Thighs) 10/17/2019    BONE SCAN     BRAIN MRI       Action    Action Taken 6/22/2021 9:14AM     I called julieth Victor - her phone went straight to vm.     I faxed a request for bx slides to [facility] and [facility]      I called pt Kayleigh - her phone went straight to vm.    I called Kris to have IMG pushed to PACS Ph: [phone]    I called [facility]'s imaging dept Ph: [phone]    I called [facility] Radiology [phone] #2- julieth Victor hasn't been to their facility since 2019. They require an PETER before sending over US and Mammo images and reports.     10:17AM   I called the [facility] Ph: [phone]  - they recommended calling   [facility] Path Ph: [phone]    6/23/2021 2:09pm   I called julieth Kayleigh - her phone went straight to vm. Her phone phone doesn't accept blocked calls either.     6/30/2021 3:21pm   I called julieth Victor - I left a vm on her home and mobile phones requesting a call back.     Kayleigh called me back and I emailed her an PETER form. Email: [email or web address]    7/1/2021 8:29AM  I called julieth Victor back to follow up on the PETER- she faxed it over this morning.     I resolved IMG from [facility] Rad in PACS

## 2021-06-24 ENCOUNTER — MYC MEDICAL ADVICE (OUTPATIENT)
Dept: PHARMACY | Facility: CLINIC | Age: 66
End: 2021-06-24

## 2021-06-24 DIAGNOSIS — I10 BENIGN ESSENTIAL HYPERTENSION: ICD-10-CM

## 2021-06-28 PROCEDURE — 999N001032 HC STATISTIC REVIEW OUTSIDE SLIDES TC 88321: Performed by: INTERNAL MEDICINE

## 2021-06-28 PROCEDURE — 88321 CONSLTJ&REPRT SLD PREP ELSWR: CPT | Performed by: PATHOLOGY

## 2021-06-28 RX ORDER — HYDROCHLOROTHIAZIDE 12.5 MG/1
25 TABLET ORAL DAILY
Qty: 60 TABLET | Refills: 3 | Status: SHIPPED | OUTPATIENT
Start: 2021-06-28 | End: 2021-10-19

## 2021-06-28 NOTE — TELEPHONE ENCOUNTER
Resent hydrochlorothiazide due to patient allergies to [employer]. Gave verbal Rx to pharmacy on behalf of provider due pharmacy not having electrotonic Rx. Completed in accordance with [facility] CPA.    Fred MendezD  PGY1 Medication Therapy Management Resident  Voicemail: [phone]

## 2021-06-29 LAB — COPATH REPORT: NORMAL

## 2021-07-01 ENCOUNTER — PRE VISIT (OUTPATIENT)
Dept: ONCOLOGY | Facility: CLINIC | Age: 66
End: 2021-07-01

## 2021-07-01 ENCOUNTER — ONCOLOGY VISIT (OUTPATIENT)
Dept: ONCOLOGY | Facility: CLINIC | Age: 66
End: 2021-07-01
Attending: INTERNAL MEDICINE
Payer: COMMERCIAL

## 2021-07-01 ENCOUNTER — HOSPITAL ENCOUNTER (OUTPATIENT)
Facility: CLINIC | Age: 66
Setting detail: SPECIMEN
Discharge: HOME | End: 2021-07-01
Attending: INTERNAL MEDICINE | Admitting: INTERNAL MEDICINE
Payer: COMMERCIAL

## 2021-07-01 VITALS
RESPIRATION RATE: 16 BRPM | TEMPERATURE: 98.5 F | OXYGEN SATURATION: 99 % | BODY MASS INDEX: 26.82 KG/M2 | DIASTOLIC BLOOD PRESSURE: 87 MMHG | HEART RATE: 77 BPM | HEIGHT: 63 IN | WEIGHT: 151.4 LBS | SYSTOLIC BLOOD PRESSURE: 148 MMHG

## 2021-07-01 DIAGNOSIS — C50.412 MALIGNANT NEOPLASM OF UPPER-OUTER QUADRANT OF LEFT BREAST IN FEMALE, ESTROGEN RECEPTOR POSITIVE (H): Primary | ICD-10-CM

## 2021-07-01 DIAGNOSIS — Z17.0 MALIGNANT NEOPLASM OF UPPER-OUTER QUADRANT OF LEFT BREAST IN FEMALE, ESTROGEN RECEPTOR POSITIVE (H): Primary | ICD-10-CM

## 2021-07-01 LAB
ALBUMIN SERPL-MCNC: 4.3 G/DL (ref 3.4–5)
ALP SERPL-CCNC: 109 U/L (ref 40–150)
ALT SERPL W P-5'-P-CCNC: 32 U/L (ref 0–50)
ANION GAP SERPL CALCULATED.3IONS-SCNC: 3 MMOL/L (ref 3–14)
AST SERPL W P-5'-P-CCNC: 36 U/L (ref 0–45)
BILIRUB SERPL-MCNC: 0.6 MG/DL (ref 0.2–1.3)
BUN SERPL-MCNC: 26 MG/DL (ref 7–30)
CALCIUM SERPL-MCNC: 10 MG/DL (ref 8.5–10.1)
CHLORIDE SERPL-SCNC: 105 MMOL/L (ref 94–109)
CO2 SERPL-SCNC: 31 MMOL/L (ref 20–32)
CREAT SERPL-MCNC: 1.39 MG/DL (ref 0.52–1.04)
ERYTHROCYTE [DISTWIDTH] IN BLOOD BY AUTOMATED COUNT: 13.2 % (ref 10–15)
GFR SERPL CREATININE-BSD FRML MDRD: 39 ML/MIN/{1.73_M2}
GLUCOSE SERPL-MCNC: 88 MG/DL (ref 70–99)
HCT VFR BLD AUTO: 45.8 % (ref 35–47)
HGB BLD-MCNC: 14.6 G/DL (ref 11.7–15.7)
MCH RBC QN AUTO: 29.1 PG (ref 26.5–33)
MCHC RBC AUTO-ENTMCNC: 31.9 G/DL (ref 31.5–36.5)
MCV RBC AUTO: 91 FL (ref 78–100)
PLATELET # BLD AUTO: 244 10E9/L (ref 150–450)
POTASSIUM SERPL-SCNC: 3.8 MMOL/L (ref 3.4–5.3)
PROT SERPL-MCNC: 8.8 G/DL (ref 6.8–8.8)
RBC # BLD AUTO: 5.02 10E12/L (ref 3.8–5.2)
SODIUM SERPL-SCNC: 139 MMOL/L (ref 133–144)
WBC # BLD AUTO: 2.9 10E9/L (ref 4–11)

## 2021-07-01 PROCEDURE — 85027 COMPLETE CBC AUTOMATED: CPT | Performed by: INTERNAL MEDICINE

## 2021-07-01 PROCEDURE — 99204 OFFICE O/P NEW MOD 45 MIN: CPT | Performed by: INTERNAL MEDICINE

## 2021-07-01 PROCEDURE — 36591 DRAW BLOOD OFF VENOUS DEVICE: CPT

## 2021-07-01 PROCEDURE — 88321 CONSLTJ&REPRT SLD PREP ELSWR: CPT | Performed by: PATHOLOGY

## 2021-07-01 PROCEDURE — 999N001032 HC STATISTIC REVIEW OUTSIDE SLIDES TC 88321: Performed by: INTERNAL MEDICINE

## 2021-07-01 PROCEDURE — 80053 COMPREHEN METABOLIC PANEL: CPT | Performed by: INTERNAL MEDICINE

## 2021-07-01 PROCEDURE — G0463 HOSPITAL OUTPT CLINIC VISIT: HCPCS

## 2021-07-01 PROCEDURE — 36415 COLL VENOUS BLD VENIPUNCTURE: CPT

## 2021-07-01 RX ORDER — ASCORBIC ACID 100 MG
TABLET,CHEWABLE ORAL
COMMUNITY
End: 2021-07-22

## 2021-07-01 RX ORDER — TRIAMTERENE/HYDROCHLOROTHIAZID 37.5-25 MG
1 TABLET ORAL DAILY
COMMUNITY
End: 2021-07-22

## 2021-07-01 ASSESSMENT — PAIN SCALES - GENERAL: PAINLEVEL: NO PAIN (0)

## 2021-07-01 ASSESSMENT — MIFFLIN-ST. JEOR: SCORE: 1192.94

## 2021-07-01 NOTE — PATIENT INSTRUCTIONS
Addendum: 7/1/21 I talked with Kayleigh and she doesn't want to schedule with genetics yet until she finds out if insurance will cover.  She will call us back to schedule. hoang

## 2021-07-01 NOTE — PROGRESS NOTES
Medical Assistant Note:  Kayleigh Sousa presents today for blood draw.    Patient seen by provider today: Yes: Dr. Vargas.   present during visit today: Not Applicable.    Concerns: orders released in provider encounter.    Procedure:  Labs drawn    Post Assessment:  Labs drawn without difficulty: Yes.    Discharge Plan:  Departure Mode: Ambulatory.    Face to Face Time: 10 min.    Raquel Ricardo [facility]

## 2021-07-01 NOTE — NURSING NOTE
"Oncology Rooming Note    July 1, 2021 11:21 AM   Kayleigh Sousa is a 65 year old female who presents for:    Chief Complaint   Patient presents with     Oncology Clinic Visit     New Patient Consult     Initial Vitals: BP (!) 148/87   Pulse 77   Temp 98.5  F (36.9  C) (Tympanic)   Resp 16   Ht 1.588 m (5' 2.5\")   Wt 68.7 kg (151 lb 6.4 oz)   LMP  (LMP Unknown)   SpO2 99%   BMI 27.25 kg/m   Estimated body mass index is 27.25 kg/m  as calculated from the following:    Height as of this encounter: 1.588 m (5' 2.5\").    Weight as of this encounter: 68.7 kg (151 lb 6.4 oz). Body surface area is 1.74 meters squared.  No Pain (0) Comment: Data Unavailable   No LMP recorded (lmp unknown). Patient is postmenopausal.  Allergies reviewed: Yes  Medications reviewed: Yes    Medications: Medication refills not needed today.  Pharmacy name entered into [employer]:    [facility] [address] [place] [address] [id number] [facility]    Clinical concerns: New Patient Consult       Lois Higgins CMA              "

## 2021-07-01 NOTE — LETTER
7/1/2021         RE: Kayleigh Sousa  [address]  [facility] [address]        Dear Colleague,    Thank you for referring your patient, Kayleigh Sousa, to the [facility]. Please see a copy of my visit note below.    Visit Date: 07/01/2021    Kayleigh Sousa is a 65-year-old patient who is a new visit to this clinic switching over from an oncologist at [facility].  She is here for Oncology evaluation and consultation.    CHIEF COMPLAINT:    1.  In 2018, diagnosed with right sided DCIS, status post lumpectomy and radiation therapy.  2.  In 09/2019, diagnosed with infiltrating ductal carcinoma in the upper outer quadrant of the left breast, estrogen and progesterone receptor positive, HER-2 receptor negative, stage of disease T2 N1 M0, infiltrating ductal carcinoma of the left breast, status post bilateral mastectomies.  3.  Oncotype on the left breast.  Tumor 9, low risk of recurrence range.    HISTORY OF PRESENT ILLNESS:  Kayleigh is a 65-year-old postmenopausal patient.  She has got an oncologic history which is as outlined above.  She was initially diagnosed with right sided DCIS in 2008, had a right sided lumpectomy and radiation therapy.  Then, in 2019 she was diagnosed with left sided infiltrating ductal carcinoma, which was a 1.5 cm tumor with 2 positive lymph nodes, ER/MO positive, HER2 receptor negative, grade 2, estrogen receptor was over 95% positive, progesterone receptor 10% positive, HER2 receptor negative.  She had an Oncotype sent out, which came back with a recurrence score of 9, in the low risk of recurrence range.  She had post-mastectomy radiation therapy in 12/2019 to the left side and then in 01/2020.  She started on anastrozole.  She stopped it in 11/2020 because of toxicity and is undergoing workup with an endocrinologist.  She has problems with her blood pressure, so for now, she wants to stay off the anastrozole.  She is here for intermittent followup and  establishing care.    FAMILY HISTORY:  Negative for any breast cancer.  The patient herself was diagnosed in her early 50s with DCIS and then had breast cancer on the left side at age 63.  She had a first cousin and paternal cousin with breast cancer.    PAST MEDICAL HISTORY:   1.  Question of hyperaldosteronism, currently undergoing endocrine workup.  2.  Recent history of hypertension.    PAST SURGICAL HISTORY:  History of bilateral mastectomies.  History of a right sided lumpectomy, history of appendectomy, history of foot surgery.    MEDICATIONS AND ALLERGIES:  OUTLINED IN THE NURSING RECORDS.    COMPREHENSIVE REVIEW OF SYSTEMS:  A 12-point comprehensive review of systems has been done with her today.  Overall, she feels well.  She denies any respiratory, cardiovascular, genitourinary, musculoskeletal, gastrointestinal or neurological symptomatology that is worrisome.    SOCIAL HISTORY:  The patient is .  She is a retired .  She has got 2 children, age 40 and 41.    PHYSICAL EXAMINATION:    GENERAL:  She is well-appearing lady in no acute distress.  VITAL SIGNS:  Blood pressure is 148/87, pulse 77, respiratory rate 16, weight is 151 pounds.  NECK:  Has no masses or goiter.  No cervical, supraclavicular, infraclavicular adenopathy.  CHEST:  Clear to auscultation and percussion bilaterally.  HEART:  Sounds 1 and 2, normal, no added sounds, no murmurs.  BREASTS:  The patient is status post bilateral mastectomy.  Scars look good.  Right and left axillae are negative.  GASTROINTESTINAL:  Abdomen is soft and nontender.  No hepatosplenomegaly.  EXTREMITIES:  Legs are without tenderness or edema.  NEUROLOGIC:  Peripheral neurological exam grossly intact.  The patient is alert and oriented x3.  SKIN:  No rashes or lesions.    ASSESSMENT AND PLAN:  This is a 65-year-old postmenopausal patient who was initially diagnosed with DCIS on the right breast in 2008 in her early 50s.  She then had a left sided  infiltrating ductal carcinoma in 2019.  This was a node positive with a low Oncotype score.  She is currently off anastrozole because she has undergone an endocrine workup for hyperaldosteronism.  We will readdress the hormonal therapy once she has got that workup completed. Overall with her Oncotype score, her prognosis should be good.  I have discussed with her today genetics of breast cancer and because she has had bilateral disease and also a paternal cousin of hers with breast cancer she may benefit from genetic testing.  I have made a referral today.  I have also ordered some routine blood work.  If everything looks good on the blood work I will plan to see her towards the end of November and we will discuss again the hormonal therapy.  All of the above has been discussed with her and she is in agreement with the above plan.    Victor M Vargas MD        D: 2021   T: 2021   MT: leo    Name:     OTF BURGOS  MRN:      [id number]        Account:    [id number]   :      1955           Visit Date: 2021     Document: [id number]      Again, thank you for allowing me to participate in the care of your patient.        Sincerely,        Victor M Vargas MD

## 2021-07-03 NOTE — ADDENDUM NOTE
Addendum Note by Lombardi, Susan L, RN at 9/10/2019 12:09 PM     Author: Lombardi, Susan L, RN Service: -- Author Type: RN, Care Manager    Filed: 9/10/2019  3:10 PM Date of Service: 9/10/2019 12:09 PM Status: Signed    : Lombardi, Susan L, RN (RN, Care Manager)    Encounter addended by: Lombardi, Susan L, RN on: 9/10/2019  3:10 PM      Actions taken: Sign clinical note, Charge Capture section accepted

## 2021-07-03 NOTE — ADDENDUM NOTE
Addendum Note by Vadim Whitaker MD at 8/12/2020  1:40 PM     Author: Vadim Whitaker MD Service: -- Author Type: Physician    Filed: 8/12/2020  2:17 PM Encounter Date: 8/12/2020 Status: Signed    : Vadim Whitaker MD (Physician)    Addended by: VADIM WHITAKER on: 8/12/2020 02:17 PM        Modules accepted: Orders

## 2021-07-03 NOTE — ADDENDUM NOTE
Addendum Note by Lombardi, Susan L, RN at 10/22/2019  2:40 PM     Author: Lombardi, Susan L, RN Service: -- Author Type: RN, Care Manager    Filed: 10/22/2019  4:44 PM Date of Service: 10/22/2019  2:40 PM Status: Signed    : Lombardi, Susan L, RN (RN, Care Manager)    Encounter addended by: Lombardi, Susan L, RN on: 10/22/2019  4:44 PM      Actions taken: Chief Complaint modified, Clinical Note Signed, Charge   Capture section accepted

## 2021-07-03 NOTE — ADDENDUM NOTE
Addendum Note by Lombardi, Susan L, RN at 12/17/2020 10:40 AM     Author: Lombardi, Susan L, RN Service: -- Author Type: RN, Care Manager    Filed: 12/17/2020  4:17 PM Date of Service: 12/17/2020 10:40 AM Status: Signed    : Lombardi, Susan L, RN (RN, Care Manager)    Encounter addended by: Lombardi, Susan L, RN on: 12/17/2020  4:17 PM      Actions taken: Clinical Note Signed, Charge Capture section accepted

## 2021-07-03 NOTE — ADDENDUM NOTE
Addendum Note by Lombardi, Susan L, RN at 10/8/2019  1:00 PM     Author: Lombardi, Susan L, RN Service: -- Author Type: RN, Care Manager    Filed: 10/8/2019  4:52 PM Date of Service: 10/8/2019  1:00 PM Status: Signed    : Lombardi, Susan L, RN (RN, Care Manager)    Encounter addended by: Lombardi, Susan L, RN on: 10/8/2019  4:52 PM      Actions taken: Clinical Note Signed, Charge Capture section accepted

## 2021-07-03 NOTE — ADDENDUM NOTE
Addendum Note by Michelle Betancur CMA at 6/17/2019  8:20 AM     Author: Michelle Betancur CMA Service: -- Author Type: Certified Medical Assistant    Filed: 6/17/2019  9:03 AM Encounter Date: 6/17/2019 Status: Signed    : Michelle Betancur CMA (Certified Medical Assistant)    Addended by: MICHELLE BETANCUR on: 6/17/2019 09:03 AM        Modules accepted: Orders

## 2021-07-03 NOTE — ADDENDUM NOTE
Addendum Note by Beba Hernandez MD at 10/8/2019  1:00 PM     Author: Beba Hernandez MD Service: -- Author Type: Physician    Filed: 10/9/2019 11:43 AM Date of Service: 10/8/2019  1:00 PM Status: Signed    : Beba Hernandez MD (Physician)    Encounter addended by: Beba Hernandez MD on: 10/9/2019 11:43 AM      Actions taken: Order list changed, Diagnosis association updated

## 2021-07-06 NOTE — PROGRESS NOTES
Visit Date: 07/01/2021    Kayleigh Sousa is a 65-year-old patient who is a new visit to this clinic switching over from an oncologist at [facility].  She is here for Oncology evaluation and consultation.    CHIEF COMPLAINT:    1.  In 2018, diagnosed with right sided DCIS, status post lumpectomy and radiation therapy.  2.  In 09/2019, diagnosed with infiltrating ductal carcinoma in the upper outer quadrant of the left breast, estrogen and progesterone receptor positive, HER-2 receptor negative, stage of disease T2 N1 M0, infiltrating ductal carcinoma of the left breast, status post bilateral mastectomies.  3.  Oncotype on the left breast.  Tumor 9, low risk of recurrence range.    HISTORY OF PRESENT ILLNESS:  Kayleigh is a 65-year-old postmenopausal patient.  She has got an oncologic history which is as outlined above.  She was initially diagnosed with right sided DCIS in 2008, had a right sided lumpectomy and radiation therapy.  Then, in 2019 she was diagnosed with left sided infiltrating ductal carcinoma, which was a 1.5 cm tumor with 2 positive lymph nodes, ER/CA positive, HER2 receptor negative, grade 2, estrogen receptor was over 95% positive, progesterone receptor 10% positive, HER2 receptor negative.  She had an Oncotype sent out, which came back with a recurrence score of 9, in the low risk of recurrence range.  She had post-mastectomy radiation therapy in 12/2019 to the left side and then in 01/2020.  She started on anastrozole.  She stopped it in 11/2020 because of toxicity and is undergoing workup with an endocrinologist.  She has problems with her blood pressure, so for now, she wants to stay off the anastrozole.  She is here for intermittent followup and establishing care.    FAMILY HISTORY:  Negative for any breast cancer.  The patient herself was diagnosed in her early 50s with DCIS and then had breast cancer on the left side at age 63.  She had a first cousin and paternal cousin with breast  cancer.    PAST MEDICAL HISTORY:   1.  Question of hyperaldosteronism, currently undergoing endocrine workup.  2.  Recent history of hypertension.    PAST SURGICAL HISTORY:  History of bilateral mastectomies.  History of a right sided lumpectomy, history of appendectomy, history of foot surgery.    MEDICATIONS AND ALLERGIES:  OUTLINED IN THE NURSING RECORDS.    COMPREHENSIVE REVIEW OF SYSTEMS:  A 12-point comprehensive review of systems has been done with her today.  Overall, she feels well.  She denies any respiratory, cardiovascular, genitourinary, musculoskeletal, gastrointestinal or neurological symptomatology that is worrisome.    SOCIAL HISTORY:  The patient is .  She is a retired .  She has got 2 children, age 40 and 41.    PHYSICAL EXAMINATION:    GENERAL:  She is well-appearing lady in no acute distress.  VITAL SIGNS:  Blood pressure is 148/87, pulse 77, respiratory rate 16, weight is 151 pounds.  NECK:  Has no masses or goiter.  No cervical, supraclavicular, infraclavicular adenopathy.  CHEST:  Clear to auscultation and percussion bilaterally.  HEART:  Sounds 1 and 2, normal, no added sounds, no murmurs.  BREASTS:  The patient is status post bilateral mastectomy.  Scars look good.  Right and left axillae are negative.  GASTROINTESTINAL:  Abdomen is soft and nontender.  No hepatosplenomegaly.  EXTREMITIES:  Legs are without tenderness or edema.  NEUROLOGIC:  Peripheral neurological exam grossly intact.  The patient is alert and oriented x3.  SKIN:  No rashes or lesions.    ASSESSMENT AND PLAN:  This is a 65-year-old postmenopausal patient who was initially diagnosed with DCIS on the right breast in 2008 in her early 50s.  She then had a left sided infiltrating ductal carcinoma in 2019.  This was a node positive with a low Oncotype score.  She is currently off anastrozole because she has undergone an endocrine workup for hyperaldosteronism.  We will readdress the hormonal therapy once she  has got that workup completed. Overall with her Oncotype score, her prognosis should be good.  I have discussed with her today genetics of breast cancer and because she has had bilateral disease and also a paternal cousin of hers with breast cancer she may benefit from genetic testing.  I have made a referral today.  I have also ordered some routine blood work.  If everything looks good on the blood work I will plan to see her towards the end of November and we will discuss again the hormonal therapy.  All of the above has been discussed with her and she is in agreement with the above plan.    Victor M Vargas MD        D: 2021   T: 2021   MT: leo    Name:     OTF BURGOSDedra  MRN:      [id number]        Account:    [id number]   :      1955           Visit Date: 2021     Document: [id number]

## 2021-07-09 LAB — COPATH REPORT: NORMAL

## 2021-07-13 DIAGNOSIS — I10 ESSENTIAL HYPERTENSION: Primary | ICD-10-CM

## 2021-07-15 RX ORDER — AMLODIPINE BESYLATE 10 MG/1
TABLET ORAL
Qty: 30 TABLET | Refills: 0 | Status: SHIPPED | OUTPATIENT
Start: 2021-07-15 | End: 2021-09-17

## 2021-07-15 NOTE — TELEPHONE ENCOUNTER
"Former patient of Lilly & has not established care with another provider.  Please assign refill request to covering provider per Clinic standard process.    Routing refill request to provider for review/approval because:  Labs out of range:  BP  Labs not current:  Serum creat  No pcp    Last Written Prescription Date:  2/24/21  Last Fill Quantity: ???,  # refills: ???   Last office visit provider:  1/21/21     Requested Prescriptions   Pending Prescriptions Disp Refills     amLODIPine (NORVASC) 10 MG tablet [Pharmacy Med Name: amLODIPine Besylate 10 MG Oral Tablet] 90 tablet 0     Sig: Take 1 tablet by mouth once daily       Calcium Channel Blockers Protocol  Failed - 7/13/2021  5:30 AM        Failed - Blood pressure under 140/90 in past 12 months     BP Readings from Last 3 Encounters:   07/01/21 (!) 148/87   05/26/21 (!) 153/92   04/27/21 126/78                 Failed - Normal serum creatinine on file in past 12 months     Recent Labs   Lab Test 07/01/21  1214   CR 1.39*       Ok to refill medication if creatinine is low          Passed - Recent (12 mo) or future (30 days) visit within the authorizing provider's specialty     Patient has had an office visit with the authorizing provider or a provider within the authorizing providers department within the previous 12 mos or has a future within next 30 days. See \"Patient Info\" tab in inbasket, or \"Choose Columns\" in Meds & Orders section of the refill encounter.              Passed - Medication is active on med list        Passed - Patient is age 18 or older        Passed - No active pregnancy on record        Passed - No positive pregnancy test in past 12 months             Leoncio Cadet RN 07/15/21 12:35 PM  "

## 2021-07-20 NOTE — PROGRESS NOTES
Medication Therapy Management (MTM) Encounter    ASSESSMENT:                            Medication Adherence/Access: No issues identified    Hypertension/Hypokalemia history: Patient is close to meeting blood pressure goal of < 130/85mmHg per cardiologist. Due to get BMP done after stopping triamterene.   It should be noted that lisinopril may lead to false negatives, amlodipine may may also lead to false negatives, and diuretics can also lead to false negatives when looking at the ARR. Since the patient is on all 3 the ARR should be evaluated closely, and patient is being followed by endocrinologist who is performing additional confirmatory tests relating to hyperaldosteronism.      Breast cancer: due to get genetic testing and then follow-up with oncology.     Heart Protection: ASCVD risk is <10%, would benefit from stopping aspirin, patient to discuss with new PCP, needs to establish care    Supplements: recommend  iron from other minerals and taking it with vitamin C     Vaccines: Patient would benefit from getting her 2nd COVID-19 vaccine as planned, due for Pneumovax and Shingrix.  PLAN:                            1.  Check potassium/basic metabolic profile with changing to hydrochlorothiazide in the next week, make lab only appointment to do this, order previously placed    2.  Ask new provider about needing aspirin    3.  Separate the minerals from iron, take iron with vitamin C    4.  COVID-19, great job getting the first vaccine, due get second vaccine, in the future consider Peumovax and Shingrix vaccines    Follow-up: Return in 2 months (on 9/29/2021) for MTM Pharmacist Visit, phone visit.      SUBJECTIVE/OBJECTIVE:                          Kayleigh Sousa is a 65 year old female called for a follow-up visit. She was referred to me from Chhaya Carr.  Today's visit is a follow-up MTM visit from 5/20/21 with Lizet Doll, PharmD.     Reason for visit: she is on the hydrochlorothiazide  now and will have labs recheck in 6 weeks.    Allergies/ADRs: Reviewed in chart  Tobacco: She reports that she has never smoked. She has never used smokeless tobacco.  Alcohol: not currently using  Caffeine: 2-3 cups/day of tea (peppermint), rarely sodas/day  Activity: walking almost every day     Medication Adherence/Access: Patient takes medications directly from bottles.  Patient takes medications 2 time(s) per day.   Marks it down on her calendar or in her phone to make sure she takes them.  Medication barriers: none.   The patient fills medications at [place]: NO, fills medications at [facility] in [place].  Wondering about the compounding pharmacy for future medications.     Hypertension: Current medications include amlodipine 10 mg daily, lisinopril 40 mg daily, hydrochlorothiazide 25mg once daily started July 5th, potassium 20 meq every day.  Was on triamterene-hydrochlorothiazide 37.5-25 mg 1 tablet daily before changing to hydrochlorothiazide. Blood pressure at home 137/80.  Per Dr. Pizarro her blood pressure goal is <130/85.  No side effects reported, no allergies.    Historically: Had hair growth with spironolactone and does not want to retry that again. Also had hair growth concerns while on minoxidil.  Patient reports that her pharmacy is very good about trying to find medications that do not have the dyes that she is allergic to. Despite their help she still has many allergies, so it is difficult. She is currently allergic to a dye in her triamterene-hydrochlorothiazide.  Saw Dr. Mcneill (encorinologist) on 4/21 - wanting patient to come off triamterene, then repeat lab work  Saw Dr. Pizarro (cardiologist) on 3/22 - recommended continuing lisinopril. Also recommended seeing specialist for elevated blood pressure.      BP Readings from Last 3 Encounters:   07/01/21 (!) 148/87   05/26/21 (!) 153/92   04/27/21 126/78     Potassium   Date Value Ref Range Status   07/01/2021 3.8 3.4 - 5.3 mmol/L Final     Comment:      Specimen slightly hemolyzed, potassium may be falsely elevated     Creatinine   Date Value Ref Range Status   07/01/2021 1.39 (H) 0.52 - 1.04 mg/dL Final     Breast Cancer:  Current medications include: none.  Was anastrazole but stopped it until she got her blood pressure under control, then she will decide whether she wants to go back on hormone therapy.  Specialist: Dr. Vargas [facility].  Last visit on 7/1/21, the following was recommended genetic testing.     Heart protection:  Current medications include: aspirin 81 mg daily. No concerns with bruising or bleeding.  Family history of hypertension.  The 10-year ASCVD risk score (Hannapatric KOENIG Jr., et al., 2013) is: 7.3%    Values used to calculate the score:      Age: 65 years      Sex: Female      Is Non- : Yes      Diabetic: No      Tobacco smoker: No      Systolic Blood Pressure: 126 mmHg      Is BP treated: Yes      HDL Cholesterol: 83 mg/dL      Total Cholesterol: 181 mg/dL     Recent Labs   Lab Test 04/27/21  1044   CHOL 181   HDL 83   LDL 76   TRIG 109      Vaccines:  Patient reports tolerating COVID vaccine will get 2nd one  Most Recent Immunizations   Administered Date(s) Administered     COVID-19,PF,Pfizer 07/05/2021     TDAP Vaccine (Adacel) 05/11/2016     Zoster vaccine, live 06/19/2017     Supplements: Currently taking MVI, ferrous sulfate, zinc, vitamin C, vitamin D, magnesium and she takes these at the same time. No reported issues at this time.   Hemoglobin   Date Value Ref Range Status   07/01/2021 14.6 11.7 - 15.7 g/dL Final   ]  Today's Vitals: LMP  (LMP Unknown)      Anxiety:  Stopped Lexapro for rash on face, like heat rash, she feels she is doing okay with deep breathing.  She does not need anything else right now for anxiety  ANTHONY-7 SCORE 5/26/2021   Total Score 6     ----------------      I spent 28 minutes with this patient today. All changes were made via collaborative practice  agreement with Chhaya Carr previously. A copy of the visit note was not provided to the patient's primary care provider, no longer in system, patient need to establish with new PCP.    The patient was sent via [employer] a summary of these recommendations.     Denisse Garcia, PharmD [facility]  Medication Therapy Management Practitioner   #[phone]    Telemedicine Visit Details  Type of service:  Telephone visit  Start Time: 8:01am  End Time: 8:29 AM  Originating Location (patient location): [place]  Distant Location (provider location):  [facility]     Medication Therapy Recommendations  Hypokalemia    Current Medication: hydrochlorothiazide (HYDRODIURIL) 12.5 MG tablet   Rationale: Medication requires monitoring - Needs additional monitoring - Safety   Recommendation: Order Lab - BMP   Status: Patient Agreed - Adherence/Education         Takes dietary supplements    Current Medication: Ferrous Sulfate (IRON PO)   Rationale: Medication interaction - Dosage too low - Effectiveness   Recommendation: Change Administration Time - take vitamin c with iron   Status: Patient Agreed - Adherence/Education

## 2021-07-22 ENCOUNTER — ALLIED HEALTH/NURSE VISIT (OUTPATIENT)
Dept: PHARMACY | Facility: CLINIC | Age: 66
End: 2021-07-22
Payer: COMMERCIAL

## 2021-07-22 DIAGNOSIS — Z79.82 CURRENT USE OF ASPIRIN: ICD-10-CM

## 2021-07-22 DIAGNOSIS — Z78.9 TAKES DIETARY SUPPLEMENTS: ICD-10-CM

## 2021-07-22 DIAGNOSIS — Z71.85 VACCINE COUNSELING: ICD-10-CM

## 2021-07-22 DIAGNOSIS — I10 BENIGN ESSENTIAL HYPERTENSION: ICD-10-CM

## 2021-07-22 DIAGNOSIS — Z85.3 HX: BREAST CANCER: ICD-10-CM

## 2021-07-22 DIAGNOSIS — E87.6 HYPOKALEMIA: Primary | ICD-10-CM

## 2021-07-22 LAB
LAB AP CHARGES (HE HISTORICAL CONVERSION): ABNORMAL
PATH REPORT.ADDENDUM SPEC: ABNORMAL
PATH REPORT.COMMENTS IMP SPEC: ABNORMAL
PATH REPORT.COMMENTS IMP SPEC: ABNORMAL
PATH REPORT.FINAL DX SPEC: ABNORMAL
PATH REPORT.GROSS SPEC: ABNORMAL
PATH REPORT.MICROSCOPIC SPEC OTHER STN: ABNORMAL
PATH REPORT.MICROSCOPIC SPEC OTHER STN: ABNORMAL
PATH REPORT.RELEVANT HX SPEC: ABNORMAL
RESULT FLAG (HE HISTORICAL CONVERSION): ABNORMAL

## 2021-07-22 PROCEDURE — 99606 MTMS BY PHARM EST 15 MIN: CPT | Performed by: PHARMACIST

## 2021-07-22 PROCEDURE — 99607 MTMS BY PHARM ADDL 15 MIN: CPT | Performed by: PHARMACIST

## 2021-07-22 RX ORDER — CALCIUM CARBONATE/VITAMIN D3 500-10/5ML
1 LIQUID (ML) ORAL DAILY
COMMUNITY

## 2021-07-22 RX ORDER — MULTIVIT-MIN/IRON/FOLIC ACID/K 18-600-40
500 CAPSULE ORAL DAILY
COMMUNITY

## 2021-07-22 NOTE — PATIENT INSTRUCTIONS
Recommendations from today's MTM visit:                                                      1.  Check potassium/basic metabolic profile with changing to hydrochlorothiazide in the next week, make lab only appointment to do this, order previously placed    2.  Ask new provider about needing aspirin, you may be able to stop this    3.  Separate the minerals from iron, take iron with vitamin C    4.  COVID-19, great job getting the first vaccine, due get second vaccine, in the future consider Peumovax and Shingrix vaccines    Follow-up: Return in 2 months (on 9/29/2021) for MTM Pharmacist Visit, phone visit.    To schedule another MTM appointment, please call the clinic directly or you may call the MTM scheduling line at [phone] or toll-free at [phone].     My Clinical Pharmacist's contact information:                                                      It was a pleasure seeing you today!  Please feel free to contact me with any questions or concerns you have.      Denisse Garcia, PharmD [facility]  Medication Therapy Management Practitioner   #[phone]    It was great to speak with you today.  I value your experience and would be very thankful for your time with providing feedback on our clinic survey. You may receive a survey via email or text message in the next few days.

## 2021-08-03 ENCOUNTER — LAB (OUTPATIENT)
Dept: LAB | Facility: CLINIC | Age: 66
End: 2021-08-03
Payer: COMMERCIAL

## 2021-08-03 DIAGNOSIS — I10 BENIGN ESSENTIAL HYPERTENSION: ICD-10-CM

## 2021-08-03 PROBLEM — C50.919 BREAST CANCER (H): Status: RESOLVED | Noted: 2021-01-12 | Resolved: 2021-01-12

## 2021-08-03 PROCEDURE — 36415 COLL VENOUS BLD VENIPUNCTURE: CPT

## 2021-08-03 PROCEDURE — 80048 BASIC METABOLIC PNL TOTAL CA: CPT

## 2021-08-04 LAB
ANION GAP SERPL CALCULATED.3IONS-SCNC: 4 MMOL/L (ref 3–14)
BUN SERPL-MCNC: 15 MG/DL (ref 7–30)
CALCIUM SERPL-MCNC: 9.9 MG/DL (ref 8.5–10.1)
CHLORIDE BLD-SCNC: 103 MMOL/L (ref 94–109)
CO2 SERPL-SCNC: 31 MMOL/L (ref 20–32)
CREAT SERPL-MCNC: 1.03 MG/DL (ref 0.52–1.04)
GFR SERPL CREATININE-BSD FRML MDRD: 57 ML/MIN/1.73M2
GLUCOSE BLD-MCNC: 93 MG/DL (ref 70–99)
POTASSIUM BLD-SCNC: 3.4 MMOL/L (ref 3.4–5.3)
SODIUM SERPL-SCNC: 138 MMOL/L (ref 133–144)

## 2021-08-30 ENCOUNTER — OFFICE VISIT (OUTPATIENT)
Dept: OPTOMETRY | Facility: CLINIC | Age: 66
End: 2021-08-30
Payer: COMMERCIAL

## 2021-08-30 DIAGNOSIS — B02.39 DERMATITIS OF EYELID OF RIGHT EYE DUE TO HERPES ZOSTER: Primary | ICD-10-CM

## 2021-08-30 DIAGNOSIS — E26.9 HYPERALDOSTERONISM (H): Primary | ICD-10-CM

## 2021-08-30 PROCEDURE — 99203 OFFICE O/P NEW LOW 30 MIN: CPT | Performed by: OPTOMETRIST

## 2021-08-30 ASSESSMENT — VISUAL ACUITY
OS_CC: 20/20
METHOD: SNELLEN - LINEAR
OD_CC+: -2
CORRECTION_TYPE: GLASSES
OD_CC: 20/20
OS_CC+: -3

## 2021-08-30 ASSESSMENT — CUP TO DISC RATIO: OD_RATIO: 0.5

## 2021-08-30 ASSESSMENT — TONOMETRY: IOP_METHOD: APPLANATION

## 2021-08-30 NOTE — LETTER
8/30/2021         RE: Kayleigh Sousa  [address] [place] [address]        Dear Colleague,    Thank you for referring your patient, Kayleigh Sousa, to the [facility]. Please see a copy of my visit note below.    Chief Complaint   Patient presents with     Swollen Eye     Chief Complaint         Swollen Eye   HPI     Od eye swollen, red, some itching x 1 week,pt also had pimples under lower lid on cheek. Patient used saline solution and tea bag compresses and honey and homeopathic eye drops for allergies. Od eye is mildly improving.    Cold compresses      Patient does not wear contacts       Medical, surgical and family histories reviewed and updated 8/30/2021.     Had fatigue, but had just been to [place] to see mother who had a stroke  No constitutional problems   No ear involvement , had some tingling in her scalp when she brushed her hair then tingling of lower lid and pimples appeared started with white bumps now crusted over   See photo from my chart encounter to Claudette Castillo 8/27/21OBJECTIVE: See Ophthalmology exam    ASSESSMENT:    ICD-10-CM    1. Dermatitis of eyelid of right eye due to herpes zoster  B02.39       Normal vision no other findings     PLAN:   Warm compresses keep clean no need for medication at this point   Tylenol if needed  Artificial tears  If needed   shingrix in 6 mos    Sissy Lerma OD       Again, thank you for allowing me to participate in the care of your patient.        Sincerely,        Sissy Lerma, OD

## 2021-08-30 NOTE — PATIENT INSTRUCTIONS
Mild form of shingles on R lower lid   Patient Education     Shingles  Shingles is a viral infection caused by the same virus that causes chicken pox. Anyone who has had chicken pox may get shingles later in life. The virus stays in the body, but remains asleep (dormant). Shingles often occurs in older persons or persons with lowered immunity. But it can affect anyone at any age.  Shingles starts as a tingling patch of skin on one side of the body. Small, painful blisters may then appear. The rash rarely spreads to other parts of the body.  Exposure to shingles can't cause shingles. However, it can cause chicken pox in anyone who has not had chicken pox or has not been vaccinated. The contagious period ends when all blisters have crusted over, generally 1 to 2 weeks after the illness starts.  After the blisters heal, the affected skin may be sensitive or painful for weeks or months, gradually resolving over time. But, sometimes this can last longer and be permanent (called postherpetic neuralgia.)  Shingles vaccines are available. Vaccination can help prevent shingles or make it less painful. It is generally recommended for adults older than 50, even if you've had singles in the past. Talk with your healthcare provider about when to get vaccinated and which vaccine is best for you.  Home care    Medicines may be prescribed to help relieve pain. Take these medicines as directed. Ask your healthcare provider or pharmacist before using over-the-counter medicines for helping treat pain and itching.    In certain cases, antiviral medicines may be prescribed to reduce pain, shorten the illness, and prevent neuralgia. Take these medicines as directed.    Compresses made from a solution of cool water mixed with cornstarch or baking soda may help relieve pain and itching.     Gently wash skin daily with soap and water to help prevent infection. Be certain to rinse off all of the soap, which can be irritating.    Trim  fingernails and try not to scratch. Scratching the sores may leave scars.    Stay home from work or school until all blisters have formed a crust and you are no longer contagious.  Follow-up care  Follow up with your healthcare provider, or as directed.  When to seek medical advice    Fever of 100.4 F (38 C) or higher, or as directed by your healthcare provider    Affected skin is on the face or neck and any of the following occur:  ? Headache  ? Eye pain  ? Changes in vision  ? Sores near the eye  ? Weakness of facial muscles    Blisters occurring on new areas of the body    Pain, redness, or swelling of a joint    Signs of skin infection: colored drainage from the sores, warmth, increasing redness, fever, or increasing pain  [employer] last reviewed this educational content on 4/1/2018 2000-2021 [employer]. All rights reserved. This information is not intended as a substitute for professional medical care. Always follow your healthcare professional's instructions.

## 2021-08-30 NOTE — PROGRESS NOTES
Chief Complaint   Patient presents with     Swollen Eye     Chief Complaint         Swollen Eye   HPI     Od eye swollen, red, some itching x 1 week,pt also had pimples under lower lid on cheek. Patient used saline solution and tea bag compresses and honey and homeopathic eye drops for allergies. Od eye is mildly improving.    Cold compresses      Patient does not wear contacts       Medical, surgical and family histories reviewed and updated 8/30/2021.     Had fatigue, but had just been to [place] to see mother who had a stroke  No constitutional problems   No ear involvement , had some tingling in her scalp when she brushed her hair then tingling of lower lid and pimples appeared started with white bumps now crusted over   See photo from my chart encounter to Claudette Anna 8/27/21OBJECTIVE: See Ophthalmology exam    ASSESSMENT:    ICD-10-CM    1. Dermatitis of eyelid of right eye due to herpes zoster  B02.39       Normal vision no other findings     PLAN:   Warm compresses keep clean no need for medication at this point   Tylenol if needed  Artificial tears  If needed   shingrix in 6 mos    Sissy Lerma OD

## 2021-09-08 ENCOUNTER — NURSE TRIAGE (OUTPATIENT)
Dept: INTERNAL MEDICINE | Facility: CLINIC | Age: 66
End: 2021-09-08

## 2021-09-08 ENCOUNTER — OFFICE VISIT (OUTPATIENT)
Dept: URGENT CARE | Facility: URGENT CARE | Age: 66
End: 2021-09-08
Payer: COMMERCIAL

## 2021-09-08 VITALS
OXYGEN SATURATION: 99 % | HEART RATE: 72 BPM | TEMPERATURE: 98.1 F | SYSTOLIC BLOOD PRESSURE: 153 MMHG | DIASTOLIC BLOOD PRESSURE: 92 MMHG

## 2021-09-08 DIAGNOSIS — I10 ESSENTIAL HYPERTENSION: Primary | ICD-10-CM

## 2021-09-08 DIAGNOSIS — N18.30 STAGE 3 CHRONIC KIDNEY DISEASE, UNSPECIFIED WHETHER STAGE 3A OR 3B CKD (H): ICD-10-CM

## 2021-09-08 DIAGNOSIS — E26.9 HYPERALDOSTERONISM (H): ICD-10-CM

## 2021-09-08 PROCEDURE — 99214 OFFICE O/P EST MOD 30 MIN: CPT | Performed by: PHYSICIAN ASSISTANT

## 2021-09-08 NOTE — TELEPHONE ENCOUNTER
"Calling with concerns of high blood pressure. Two separate readings 170/90 and again at 188/101. Took again while on phone and got 173/90, HR: 72. States she is having a slight headache. Vomited last night, thought it was from eating. Does not know if that was related, thinks it may be due to her high blood pressure. Patient states it has been high for the last 2-3 days. Patient states she could tell it was high because she has not felt right.     SEE TODAY IN OFFICE:   * You need to be examined today. Let me give you an appointment.  * IF NO AVAILABLE APPOINTMENTS:  You need to be seen in the Urgent Care Center. Go to the one at [facility]. Go there today. A nearby Urgent Care Center is often a good source of care. Another choice is to go to the ER.    Advised patient be seen in clinic today but with the time of day, there are no openings.     Advised patient be seen in urgent care due to having symptoms along with HTN. Patient agrees and will go into [facility].     MARÍA Recio/[facility]        1. BLOOD PRESSURE: \"What is the blood pressure?\" \"Did you take at least two measurements 5 minutes apart?\"      Yes  2. ONSET: \"When did you take your blood pressure?\"      Today  3. HOW: \"How did you obtain the blood pressure?\" (e.g., visiting nurse, automatic home BP monitor)      Automatic upper arm  4. HISTORY: \"Do you have a history of high blood pressure?\"      Yes  5. MEDICATIONS: \"Are you taking any medications for blood pressure?\" \"Have you missed any doses recently?\"      Yes, no missed doses  6. OTHER SYMPTOMS: \"Do you have any symptoms?\" (e.g., headache, chest pain, blurred vision, difficulty breathing, weakness)      Slight headache  7. PREGNANCY: \"Is there any chance you are pregnant?\" \"When was your last menstrual period?\"      NA      Reason for Disposition    Systolic BP >= 180 OR Diastolic >= 110    Additional Information    Negative: Sounds like a life-threatening emergency " to the triager    Negative: Pregnant > 20 weeks or postpartum (< 6 weeks after delivery) and new hand or face swelling    Negative: Pregnant > 20 weeks and BP > 140/90    Negative: Systolic BP >= 160 OR Diastolic >= 100, and any cardiac or neurologic symptoms (e.g., chest pain, difficulty breathing, unsteady gait, blurred vision)    Negative: Patient sounds very sick or weak to the triager    Negative: BP Systolic BP >= 140 OR Diastolic >= 90 and postpartum (from 0 to 6 weeks after delivery)    Negative: Systolic BP >= 180 OR Diastolic >= 110, and missed most recent dose of blood pressure medication    Protocols used: HIGH BLOOD PRESSURE-A-OH

## 2021-09-08 NOTE — PATIENT INSTRUCTIONS
Contact your [place] pharmacist regarding your blood pressure issues. Also follow up with primary care/establish with a new provider.     In the meantime, keep taking your BP regularly and keep a log of this.  Come to urgent care (or ER if severe) for severe headache, vision changes, weakness, numbness, slurred speech, facial droop, confusion, chest pain, or shortness of breath.

## 2021-09-08 NOTE — PROGRESS NOTES
Assessment/Plan:    Unremarkable neuro exam, RRR, no M/R/G. Do not suspect vomiting last night was related to elevated BP (was likely due to gastritis/GERD secondary to tomato sauce). No signs of target organ damage/hypertensive emergency. Discussed a slight HA can be normal with elevated BP; discussed warning symptoms that would warrant going to the ER.   Pt has complicated history with CKD and hyperaldosteronism. She is already on max dose of amlodipine & lisinopril, hydrochlorothiazide dose was recently reduced so I am not comfortable increasing this again. As pt has no indication of hypertensive emergency today and is stable, feel it is best if she work with her primary care clinic and [facility] pharmacist for her BP control. Advised she contact [facility] pharmacist and also try and get an appt with a new provider at the [facility].  See patient instructions below.    At the end of the encounter, I discussed results, diagnosis, medications. Discussed red flags for immediate return to clinic/ER, as well as indications for follow up if no improvement. Patient understood and agreed to plan. Patient was stable for discharge.      ICD-10-CM    1. Essential hypertension  I10    2. Hyperaldosteronism (H)  E26.9    3. Stage 3 chronic kidney disease, unspecified whether stage 3a or 3b CKD  N18.30          Return in about 1 week (around 9/15/2021) for Recheck with primary care provider.    BOBO Godfrey, KEREN  [facility]  -----------------------------------------------------------------------------------------------------------------------------------------------------    HPI:  Kayleigh Sousa is a 66 year old female with hx of HTN, stage 3 CKD, and hyperaldosteronism who presents for evaluation of elevated BP readings x 2 days. Her BP has been around 170/90. She states she recently went out to eat, and if she eats a lot of sodium her BP can temporarily increase for a few days. She was told  she should start a sodium supplement due to her hx of hyperaldosteronism, but she is hesitant to do so due to her HTN, and states she is being tested again to ensure she does in fact have hyperaldosteronism. She takes amlodipine, lisinopril, and hydrochlorothiazide for her BP.  The hydrochlorothiazide dose was reduced recently due to elevated creatinine; used to be combination with triamterene but this was discontinued. She works with a [facility] pharmacist. Her PCP at [facility] recently left, and she is in the process of trying to establish care with a new PCP.    She also notes feeling heartburn last night after eating tomato sauce, which she normally does not eat. She vomited once and felt better.     She has had a mild HA today, denies worst HA of life.    Patient reports no fever/chills, vision changes, focal weakness, slurred speech, facial droop, numbness/tingling, chest pain, shortness of breath, leg swelling, abdominal pain, diarrhea, rash, or any other symptoms.     Past Medical History:   Diagnosis Date     Benign essential hypertension      Leukopenia     mild-3100 wbc     Malignant neoplasm of upper-outer quadrant of left breast in female, estrogen receptor positive (H)      PAC (premature atrial contraction)        Vitals:    09/08/21 1637   BP: (!) 153/92   Pulse: 72   Temp: 98.1  F (36.7  C)   TempSrc: Tympanic   SpO2: 99%       Physical Exam  Vitals and nursing note reviewed.   Eyes:      Extraocular Movements: Extraocular movements intact.      Pupils: Pupils are equal, round, and reactive to light.   Cardiovascular:      Rate and Rhythm: Normal rate and regular rhythm.      Heart sounds: Normal heart sounds.   Pulmonary:      Effort: Pulmonary effort is normal.      Breath sounds: Normal breath sounds.   Neurological:      Mental Status: She is alert.      GCS: GCS eye subscore is 4. GCS verbal subscore is 5. GCS motor subscore is 6.      Cranial Nerves: Cranial nerves are intact.       Sensory: Sensation is intact.      Motor: Motor function is intact.      Coordination: Coordination is intact.      Gait: Gait is intact.         Labs/Imaging:  No results found for this or any previous visit (from the past 24 hour(s)).      Patient Instructions   Contact your [facility] pharmacist regarding your blood pressure issues. Also follow up with primary care/establish with a new provider.     In the meantime, keep taking your BP regularly and keep a log of this.  Come to urgent care (or ER if severe) for severe headache, vision changes, weakness, numbness, slurred speech, facial droop, confusion, chest pain, or shortness of breath.

## 2021-09-17 ENCOUNTER — MYC MEDICAL ADVICE (OUTPATIENT)
Dept: PEDIATRICS | Facility: CLINIC | Age: 66
End: 2021-09-17

## 2021-09-17 DIAGNOSIS — I10 ESSENTIAL HYPERTENSION: ICD-10-CM

## 2021-09-22 NOTE — TELEPHONE ENCOUNTER
Routing to provider to review. Established care back in February. Requesting refill almost out of [facility], recommneded BP follow up but is declining.     BP Readings from Last 6 Encounters:   09/08/21 (!) 153/92   07/01/21 (!) 148/87   05/26/21 (!) 153/92   04/27/21 126/78   04/22/21 (!) 150/88   04/22/21 (!) 161/84     FYI - per another MyChart thread with patient, she is planning to establish care with the [place] [facility], she would prefer to establish with a MD, however she would still appreciate a refill until she can be seen on 10/19.   Breath sounds clear and equal bilaterally.

## 2021-09-23 RX ORDER — AMLODIPINE BESYLATE 10 MG/1
10 TABLET ORAL DAILY
Qty: 30 TABLET | Refills: 0 | Status: SHIPPED | OUTPATIENT
Start: 2021-09-23 | End: 2021-10-19

## 2021-10-19 ENCOUNTER — OFFICE VISIT (OUTPATIENT)
Dept: INTERNAL MEDICINE | Facility: CLINIC | Age: 66
End: 2021-10-19
Payer: COMMERCIAL

## 2021-10-19 VITALS
SYSTOLIC BLOOD PRESSURE: 140 MMHG | HEIGHT: 62 IN | DIASTOLIC BLOOD PRESSURE: 100 MMHG | WEIGHT: 146 LBS | TEMPERATURE: 97.5 F | BODY MASS INDEX: 26.87 KG/M2 | HEART RATE: 94 BPM | RESPIRATION RATE: 16 BRPM | OXYGEN SATURATION: 100 %

## 2021-10-19 DIAGNOSIS — Z12.11 COLON CANCER SCREENING: ICD-10-CM

## 2021-10-19 DIAGNOSIS — I10 BENIGN ESSENTIAL HYPERTENSION: ICD-10-CM

## 2021-10-19 DIAGNOSIS — N18.32 STAGE 3B CHRONIC KIDNEY DISEASE (H): ICD-10-CM

## 2021-10-19 DIAGNOSIS — I10 ESSENTIAL HYPERTENSION: Primary | ICD-10-CM

## 2021-10-19 PROCEDURE — 99214 OFFICE O/P EST MOD 30 MIN: CPT | Performed by: INTERNAL MEDICINE

## 2021-10-19 RX ORDER — HYDROCHLOROTHIAZIDE 12.5 MG/1
25 TABLET ORAL DAILY
Qty: 180 TABLET | Refills: 3 | Status: SHIPPED | OUTPATIENT
Start: 2021-10-19 | End: 2023-01-11

## 2021-10-19 RX ORDER — AMLODIPINE BESYLATE 10 MG/1
10 TABLET ORAL DAILY
Qty: 90 TABLET | Refills: 3 | Status: SHIPPED | OUTPATIENT
Start: 2021-10-19 | End: 2023-01-19

## 2021-10-19 RX ORDER — LISINOPRIL 40 MG/1
40 TABLET ORAL DAILY
Qty: 90 TABLET | Refills: 3 | Status: SHIPPED | OUTPATIENT
Start: 2021-10-19 | End: 2023-03-29

## 2021-10-19 RX ORDER — TRIAMTERENE CAPSULES 50 MG/1
50 CAPSULE ORAL 2 TIMES DAILY
Qty: 90 CAPSULE | Refills: 3 | Status: SHIPPED | OUTPATIENT
Start: 2021-10-19 | End: 2021-12-29

## 2021-10-19 ASSESSMENT — MIFFLIN-ST. JEOR: SCORE: 1159.47

## 2021-10-19 NOTE — PROGRESS NOTES
"    Assessment & Plan     Colon cancer screening    - Adult Gastro Ref - Procedure Only; Future    Essential hypertension  Cont treatment   - amLODIPine (NORVASC) 10 MG tablet; Take 1 tablet (10 mg) by mouth daily    Benign essential hypertension  Monitor BP , keep diet , exercise   - lisinopril (ZESTRIL) 40 MG tablet; Take 1 tablet (40 mg) by mouth daily  - hydrochlorothiazide (HYDRODIURIL) 12.5 MG tablet; Take 2 tablets (25 mg) by mouth daily  - triamterene (DYRENIUM) 50 MG capsule; Take 1 capsule (50 mg) by mouth 2 times daily  - **Basic metabolic panel FUTURE 14d; Future    Stage 3b chronic kidney disease (H)  Monitor renal function     Hyperaldosteronism :   Borderline elevated Aldosterone with associated hypokalemia.   Monitor , start on Triamterene for management of hypokalemia                BMI:   Estimated body mass index is 26.49 kg/m  as calculated from the following:    Height as of this encounter: 1.581 m (5' 2.25\").    Weight as of this encounter: 66.2 kg (146 lb).       See Patient Instructions    Return in about 2 weeks (around 11/2/2021) for BP Recheck, Lab Work.    Carrillo Wills MD  [facility] AAKASH Victor is a 66 year old who presents for the following health issues     HPI     New Patient/Transfer of Care  Hypertension Follow-up  Has h/o HTN. on medical treatment. BP has been controlled, but is fluctuating. No side effects from medications. No CP, HA, dizziness. good compliance with medications and low salt diet.      Do you check your blood pressure regularly outside of the clinic? No     Are you following a low salt diet? Yes    Are your blood pressures ever more than 140 on the top number (systolic) OR more   than 90 on the bottom number (diastolic), for example 140/90? Yes      How many servings of fruits and vegetables do you eat daily?  4 or more    On average, how many sweetened beverages do you drink each day (Examples: soda, juice, sweet tea, etc. "  Do NOT count diet or artificially sweetened beverages)?   1    How many days per week do you exercise enough to make your heart beat faster? 3 or less    How many minutes a day do you exercise enough to make your heart beat faster? 30 - 60    How many days per week do you miss taking your medication? 0      Has had low K levels and borderline high Aldosterone, seen endocrinology.     Has h/o breast cancer,  No recurrence.     Review of Systems   Constitutional, HEENT, cardiovascular, pulmonary, gi and gu systems are negative, except as otherwise noted.      Objective    LMP  (LMP Unknown)   There is no height or weight on file to calculate BMI.  Physical Exam   GENERAL: healthy, alert and no distress  NECK: no adenopathy, no asymmetry, masses, or scars and thyroid normal to palpation  RESP: lungs clear to auscultation - no rales, rhonchi or wheezes  CV: regular rate and rhythm, normal S1 S2, no S3 or S4, no murmur, click or rub, no peripheral edema and peripheral pulses strong  ABDOMEN: soft, nontender, no hepatosplenomegaly, no masses and bowel sounds normal  MS: no gross musculoskeletal defects noted, no edema    Lab on 08/03/2021   Component Date Value Ref Range Status     Sodium 08/03/2021 138  133 - 144 mmol/L Final     Potassium 08/03/2021 3.4  3.4 - 5.3 mmol/L Final     Chloride 08/03/2021 103  94 - 109 mmol/L Final     Carbon Dioxide (CO2) 08/03/2021 31  20 - 32 mmol/L Final     Anion Gap 08/03/2021 4  3 - 14 mmol/L Final     Urea Nitrogen 08/03/2021 15  7 - 30 mg/dL Final     Creatinine 08/03/2021 1.03  0.52 - 1.04 mg/dL Final     Calcium 08/03/2021 9.9  8.5 - 10.1 mg/dL Final     Glucose 08/03/2021 93  70 - 99 mg/dL Final     GFR Estimate 08/03/2021 57* >60 mL/min/1.73m2 Final    As of July 11, 2021, eGFR is calculated by the CKD-EPI creatinine equation, without race adjustment. eGFR can be influenced by muscle mass, exercise, and diet. The reported eGFR is an estimation only and is only applicable if the  renal function is stable.

## 2021-10-24 ENCOUNTER — HEALTH MAINTENANCE LETTER (OUTPATIENT)
Age: 66
End: 2021-10-24

## 2021-11-03 ENCOUNTER — ALLIED HEALTH/NURSE VISIT (OUTPATIENT)
Dept: INTERNAL MEDICINE | Facility: CLINIC | Age: 66
End: 2021-11-03
Payer: COMMERCIAL

## 2021-11-03 ENCOUNTER — LAB (OUTPATIENT)
Dept: LAB | Facility: CLINIC | Age: 66
End: 2021-11-03

## 2021-11-03 VITALS — SYSTOLIC BLOOD PRESSURE: 144 MMHG | DIASTOLIC BLOOD PRESSURE: 80 MMHG

## 2021-11-03 DIAGNOSIS — I10 BENIGN ESSENTIAL HYPERTENSION: ICD-10-CM

## 2021-11-03 DIAGNOSIS — I10 HYPERTENSION, UNSPECIFIED TYPE: Primary | ICD-10-CM

## 2021-11-03 PROCEDURE — 99207 PR NO CHARGE NURSE ONLY: CPT

## 2021-11-03 PROCEDURE — 80048 BASIC METABOLIC PNL TOTAL CA: CPT

## 2021-11-03 PROCEDURE — 36415 COLL VENOUS BLD VENIPUNCTURE: CPT

## 2021-11-04 LAB
ANION GAP SERPL CALCULATED.3IONS-SCNC: 7 MMOL/L (ref 3–14)
BUN SERPL-MCNC: 19 MG/DL (ref 7–30)
CALCIUM SERPL-MCNC: 9.6 MG/DL (ref 8.5–10.1)
CHLORIDE BLD-SCNC: 102 MMOL/L (ref 94–109)
CO2 SERPL-SCNC: 28 MMOL/L (ref 20–32)
CREAT SERPL-MCNC: 1.22 MG/DL (ref 0.52–1.04)
GFR SERPL CREATININE-BSD FRML MDRD: 46 ML/MIN/1.73M2
GLUCOSE BLD-MCNC: 99 MG/DL (ref 70–99)
POTASSIUM BLD-SCNC: 3.8 MMOL/L (ref 3.4–5.3)
SODIUM SERPL-SCNC: 137 MMOL/L (ref 133–144)

## 2021-11-16 ENCOUNTER — HOSPITAL ENCOUNTER (OUTPATIENT)
Facility: CLINIC | Age: 66
End: 2021-11-16
Attending: INTERNAL MEDICINE | Admitting: INTERNAL MEDICINE
Payer: COMMERCIAL

## 2021-11-16 DIAGNOSIS — Z12.11 COLON CANCER SCREENING: Primary | ICD-10-CM

## 2021-11-29 DIAGNOSIS — Z11.59 ENCOUNTER FOR SCREENING FOR OTHER VIRAL DISEASES: ICD-10-CM

## 2021-11-30 ENCOUNTER — IMMUNIZATION (OUTPATIENT)
Dept: NURSING | Facility: CLINIC | Age: 66
End: 2021-11-30
Payer: COMMERCIAL

## 2021-11-30 PROCEDURE — 91300 PR COVID VAC PFIZER DIL RECON 30 MCG/0.3 ML IM: CPT

## 2021-11-30 PROCEDURE — 0003A PR COVID VAC PFIZER DIL RECON 30 MCG/0.3 ML IM: CPT

## 2021-12-21 ENCOUNTER — VIRTUAL VISIT (OUTPATIENT)
Dept: ONCOLOGY | Facility: CLINIC | Age: 66
End: 2021-12-21
Attending: INTERNAL MEDICINE
Payer: COMMERCIAL

## 2021-12-21 DIAGNOSIS — Z17.0 MALIGNANT NEOPLASM OF UPPER-OUTER QUADRANT OF LEFT BREAST IN FEMALE, ESTROGEN RECEPTOR POSITIVE (H): Primary | ICD-10-CM

## 2021-12-21 DIAGNOSIS — C50.412 MALIGNANT NEOPLASM OF UPPER-OUTER QUADRANT OF LEFT BREAST IN FEMALE, ESTROGEN RECEPTOR POSITIVE (H): Primary | ICD-10-CM

## 2021-12-21 PROCEDURE — 99214 OFFICE O/P EST MOD 30 MIN: CPT | Mod: 95 | Performed by: INTERNAL MEDICINE

## 2021-12-21 NOTE — LETTER
12/21/2021         RE: Kayleigh Sousa  [address] [place] [address]        Dear Colleague,    Thank you for referring your patient, Kayleigh Sousa, to the [facility]. Please see a copy of my visit note below.    Kayleigh is a 66 year old who is being evaluated via a billable video visit.      How would you like to obtain your AVS? MyChart  If the video visit is dropped, the invitation should be resent by: Text to cell phone: [phone]   Will anyone else be joining your video visit? No         Eliane Murcia, Visit Facilitator/MA.          Visit Date: 12/21/2021    Kayleigh Sousa is here today for interim followup.  She has been evaluated today by a video visit due to public health emergency with coronavirus, and she has given consent.    VIDEO PLATFORM:  [employer].    PATIENT LOCATION:  Home.    PHYSICIAN LOCATION:  [facility].    CHIEF COMPLAINT:    1.  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  2.  In 2019, diagnosed with infiltrating ductal carcinoma in the left breast, ER positive, HER2 receptor negative.  Stage of disease was a T2 N1, and she is status post bilateral mastectomies.  3.  Oncotype on the left breast showed low risk of recurrence.    HISTORY OF PRESENT ILLNESS:  Kayleigh is a 66-year-old patient.  She has got a history of right-sided DCIS for which she had right-sided lumpectomy and radiation therapy, and then in 2019 she developed a left-sided infiltrating ductal carcinoma, 1.5 cm with 2 positive lymph nodes, ER/OR positive, HER2 receptor negative.  Oncotype came back in the low risk of recurrence range.  She had post-mastectomy radiation therapy on the left side and then started on adjuvant anastrozole.  She started having some problems with her blood pressure and is undergoing a workup with her endocrinologist and primary care physician to try and come up with what is the underlying problem.  For that  reason, she stopped the anastrozole about a year and to it and is waiting to finish up her workup from the endocrinologist and will discuss with him whether or not it is prudent to go back on it from an endocrine standpoint.  We have discussed this at length today.  Overall, she feels well.  She denies cough, shortness of breath or bone pain.    REVIEW OF SYSTEMS:  A 12-point comprehensive review of systems is unremarkable.  She does get some lymphedema intermittently.    SOCIAL HISTORY:  The patient lives with her .  She has a supportive family network.    HEALTH SCREENING:  She has had the COVID vaccine and the booster.    FAMILY HISTORY:  Negative for breast cancer, apart from a first cousin and paternal cousin with breast cancer.    PHYSICAL EXAMINATION:    GENERAL:  Overall, she looks well.  EYES:  Have no redness or discharge.  RESPIRATORY:  No cough or labored breathing.  MUSCULOSKELETAL:  Normal range of movement.  SKIN:  Has no discoloration or lesions.  NEUROLOGIC:  Alert and oriented x 3.  PSYCHIATRIC:  Normal.    Rest of the comprehensive physical exam was deferred because this is a video visit with video visit restrictions.    ASSESSMENT AND PLAN:  A 66-year-old patient who initially was diagnosed with a right-sided ductal carcinoma in situ a number of years ago in 2008.  Then, in 2019 she developed a left-sided infiltrating ductal carcinoma as outlined above.  The patient has done well with both her DCIS and her breast cancer.  Her Oncotype on the left side came back low.  We are planning to continue her anastrozole, but she is currently having it held because she is undergoing a workup for hyperaldosteronism.  She will check with her endocrinologist if it is fine to go back on it.  I will plan to see her back in about 4 months to discuss it again, and hopefully she will have a definitive diagnosis at that point from Endocrinology.  All of the above has been discussed with her, and she is in  agreement with the plan.      Victor M Vargas MD        D: 2021   T: 2021   MT: HAMLET    Name:     OTF BURGOS  MRN:      [id number]        Account:    [id number]   :      1955           Visit Date: 2021     Document: [id number]      Again, thank you for allowing me to participate in the care of your patient.        Sincerely,        Victor M Vargas MD

## 2021-12-21 NOTE — PROGRESS NOTES
Kayleigh is a 66 year old who is being evaluated via a billable video visit.      How would you like to obtain your AVS? MyChart  If the video visit is dropped, the invitation should be resent by: Text to cell phone: [phone]   Will anyone else be joining your video visit? Mary Murcia, Visit Facilitator/MA.

## 2021-12-21 NOTE — LETTER
12/21/2021         RE: Kayleigh Sousa  [address] [place] [address]        Dear Colleague,    Thank you for referring your patient, Kayleigh Sousa, to the [facility]. Please see a copy of my visit note below.    Kayleigh is a 66 year old who is being evaluated via a billable video visit.      How would you like to obtain your AVS? MyChart  If the video visit is dropped, the invitation should be resent by: Text to cell phone: [phone]   Will anyone else be joining your video visit? No         Eliane Murcia, Visit Facilitator/MA.          Visit Date: 12/21/2021    Kayleigh Sousa is here today for interim followup.  She has been evaluated today by a video visit due to public health emergency with coronavirus, and she has given consent.    VIDEO PLATFORM:  [employer].    PATIENT LOCATION:  Home.    PHYSICIAN LOCATION:  [facility].    CHIEF COMPLAINT:    1.  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  2.  In 2019, diagnosed with infiltrating ductal carcinoma in the left breast, ER positive, HER2 receptor negative.  Stage of disease was a T2 N1, and she is status post bilateral mastectomies.  3.  Oncotype on the left breast showed low risk of recurrence.    HISTORY OF PRESENT ILLNESS:  Kayleigh is a 66-year-old patient.  She has got a history of right-sided DCIS for which she had right-sided lumpectomy and radiation therapy, and then in 2019 she developed a left-sided infiltrating ductal carcinoma, 1.5 cm with 2 positive lymph nodes, ER/IA positive, HER2 receptor negative.  Oncotype came back in the low risk of recurrence range.  She had post-mastectomy radiation therapy on the left side and then started on adjuvant anastrozole.  She started having some problems with her blood pressure and is undergoing a workup with her endocrinologist and primary care physician to try and come up with what is the underlying problem.  For that  reason, she stopped the anastrozole about a year and to it and is waiting to finish up her workup from the endocrinologist and will discuss with him whether or not it is prudent to go back on it from an endocrine standpoint.  We have discussed this at length today.  Overall, she feels well.  She denies cough, shortness of breath or bone pain.    REVIEW OF SYSTEMS:  A 12-point comprehensive review of systems is unremarkable.  She does get some lymphedema intermittently.    SOCIAL HISTORY:  The patient lives with her .  She has a supportive family network.    HEALTH SCREENING:  She has had the COVID vaccine and the booster.    FAMILY HISTORY:  Negative for breast cancer, apart from a first cousin and paternal cousin with breast cancer.    PHYSICAL EXAMINATION:    GENERAL:  Overall, she looks well.  EYES:  Have no redness or discharge.  RESPIRATORY:  No cough or labored breathing.  MUSCULOSKELETAL:  Normal range of movement.  SKIN:  Has no discoloration or lesions.  NEUROLOGIC:  Alert and oriented x 3.  PSYCHIATRIC:  Normal.    Rest of the comprehensive physical exam was deferred because this is a video visit with video visit restrictions.    ASSESSMENT AND PLAN:  A 66-year-old patient who initially was diagnosed with a right-sided ductal carcinoma in situ a number of years ago in 2008.  Then, in 2019 she developed a left-sided infiltrating ductal carcinoma as outlined above.  The patient has done well with both her DCIS and her breast cancer.  Her Oncotype on the left side came back low.  We are planning to continue her anastrozole, but she is currently having it held because she is undergoing a workup for hyperaldosteronism.  She will check with her endocrinologist if it is fine to go back on it.  I will plan to see her back in about 4 months to discuss it again, and hopefully she will have a definitive diagnosis at that point from Endocrinology.  All of the above has been discussed with her, and she is in  agreement with the plan.      Victor M Vargas MD        D: 2021   T: 2021   MT: HAMLET    Name:     OTF BURGOS  MRN:      [id number]        Account:    [id number]   :      1955           Visit Date: 2021     Document: [id number]      Again, thank you for allowing me to participate in the care of your patient.        Sincerely,        Victor M Vargas MD

## 2021-12-22 ENCOUNTER — PATIENT OUTREACH (OUTPATIENT)
Dept: ONCOLOGY | Facility: CLINIC | Age: 66
End: 2021-12-22
Payer: COMMERCIAL

## 2021-12-22 DIAGNOSIS — Z11.59 ENCOUNTER FOR SCREENING FOR OTHER VIRAL DISEASES: ICD-10-CM

## 2021-12-22 NOTE — PROGRESS NOTES
Oncology Distress Screening Follow-up  Clinical Social Work  [facility]    Identified Concern and Score From Distress Screenin. How concerned are you about your ability to eat?  0       2. How concerned are you about unintended weight loss or your current weight?  0       3. How concerned are you about feeling depressed or very sad?  1       4. How concerned are you about feeling anxious or very scared?  2       5. Do you struggle with the loss of meaning and adriana in your life?  Somewhat       6. How concerned are you about work and home life issues that may be affected by your cancer?  3       7. How concerned are you about knowing what resources are available to help you?  6 Abnormal        8. Do you currently have what you would describe as Moravian or spiritual struggles?             Not at all         Date of Distress Screenin21    Intervention:   Kayleigh is a 66-year-old woman with a history of right sided DCIS who is followed by Dr. Vargas at [facility]. At time of visit Kayleigh scored positive on oncology distress screen. This clinician called Kayleigh today with goal of following up on elevated distress.     This clinician called and left detailed voicemail with social work contact information and availability.     Education Provided:   Onc SW contact information    Follow-up Required:   This clinician will continue to be available as needed for ongoing psychosocial support. Will await return call.     SOHAN Faria, [facility]  Phone: [phone]  [facility]: M, Thu  *every other Tue, 8am-4:30pm  [facility]: W, F, *every other Tue, 8am-4:30pm

## 2021-12-22 NOTE — PROGRESS NOTES
Visit Date: 12/21/2021    Kayleigh Sousa is here today for interim followup.  She has been evaluated today by a video visit due to public health emergency with coronavirus, and she has given consent.    VIDEO PLATFORM:  [employer].    PATIENT LOCATION:  Home.    PHYSICIAN LOCATION:  [facility].    CHIEF COMPLAINT:    1.  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  2.  In 2019, diagnosed with infiltrating ductal carcinoma in the left breast, ER positive, HER2 receptor negative.  Stage of disease was a T2 N1, and she is status post bilateral mastectomies.  3.  Oncotype on the left breast showed low risk of recurrence.    HISTORY OF PRESENT ILLNESS:  Kayleigh is a 66-year-old patient.  She has got a history of right-sided DCIS for which she had right-sided lumpectomy and radiation therapy, and then in 2019 she developed a left-sided infiltrating ductal carcinoma, 1.5 cm with 2 positive lymph nodes, ER/OH positive, HER2 receptor negative.  Oncotype came back in the low risk of recurrence range.  She had post-mastectomy radiation therapy on the left side and then started on adjuvant anastrozole.  She started having some problems with her blood pressure and is undergoing a workup with her endocrinologist and primary care physician to try and come up with what is the underlying problem.  For that reason, she stopped the anastrozole about a year and to it and is waiting to finish up her workup from the endocrinologist and will discuss with him whether or not it is prudent to go back on it from an endocrine standpoint.  We have discussed this at length today.  Overall, she feels well.  She denies cough, shortness of breath or bone pain.    REVIEW OF SYSTEMS:  A 12-point comprehensive review of systems is unremarkable.  She does get some lymphedema intermittently.    SOCIAL HISTORY:  The patient lives with her .  She has a supportive family network.    HEALTH SCREENING:   She has had the COVID vaccine and the booster.    FAMILY HISTORY:  Negative for breast cancer, apart from a first cousin and paternal cousin with breast cancer.    PHYSICAL EXAMINATION:    GENERAL:  Overall, she looks well.  EYES:  Have no redness or discharge.  RESPIRATORY:  No cough or labored breathing.  MUSCULOSKELETAL:  Normal range of movement.  SKIN:  Has no discoloration or lesions.  NEUROLOGIC:  Alert and oriented x 3.  PSYCHIATRIC:  Normal.    Rest of the comprehensive physical exam was deferred because this is a video visit with video visit restrictions.    ASSESSMENT AND PLAN:  A 66-year-old patient who initially was diagnosed with a right-sided ductal carcinoma in situ a number of years ago in .  Then, in 2019 she developed a left-sided infiltrating ductal carcinoma as outlined above.  The patient has done well with both her DCIS and her breast cancer.  Her Oncotype on the left side came back low.  We are planning to continue her anastrozole, but she is currently having it held because she is undergoing a workup for hyperaldosteronism.  She will check with her endocrinologist if it is fine to go back on it.  I will plan to see her back in about 4 months to discuss it again, and hopefully she will have a definitive diagnosis at that point from Endocrinology.  All of the above has been discussed with her, and she is in agreement with the plan.      Victor M Vargas MD        D: 2021   T: 2021   MT: HAMLET    Name:     OTF BURGOS  MRN:      [id number]        Account:    [id number]   :      1955           Visit Date: 2021     Document: [id number]

## 2021-12-29 ENCOUNTER — TELEPHONE (OUTPATIENT)
Dept: ENDOCRINOLOGY | Facility: CLINIC | Age: 66
End: 2021-12-29

## 2021-12-29 ENCOUNTER — VIRTUAL VISIT (OUTPATIENT)
Dept: ENDOCRINOLOGY | Facility: CLINIC | Age: 66
End: 2021-12-29
Payer: COMMERCIAL

## 2021-12-29 DIAGNOSIS — I10 ESSENTIAL HYPERTENSION: Primary | ICD-10-CM

## 2021-12-29 DIAGNOSIS — E26.9 HYPERALDOSTERONISM (H): ICD-10-CM

## 2021-12-29 PROCEDURE — 99214 OFFICE O/P EST MOD 30 MIN: CPT | Mod: 95 | Performed by: INTERNAL MEDICINE

## 2021-12-29 RX ORDER — SPIRONOLACTONE 100 MG/1
100 TABLET, FILM COATED ORAL DAILY
Qty: 30 TABLET | Refills: 11 | Status: SHIPPED | OUTPATIENT
Start: 2021-12-29 | End: 2022-11-03

## 2021-12-29 NOTE — LETTER
"    12/29/2021         RE: Kayleigh Sousa  [address]  [place] [address]        Dear Colleague,    Thank you for referring your patient, Kayleigh Sousa, to the [facility]. Please see a copy of my visit note below.    Kayleigh Sousa  is a 66 year old year old female who is being evaluated via a billable video visit.      How would you like to obtain your AVS? [employer]  If the video visit is dropped, the invitation should be resent by: Text to cell phone: [employer]---[phone]  Will anyone else be joining your video visit? No     S:   Patient presents for evaluation of HTN.   Began treatment at age 35.   Shortly after she developed HTN, she needed to start on potassium supplementation.     No recent change in weight.   No skin or hair changes.   Notes she has been having issues with sensitive skin and hives recently.   She has undergone allergy testing. She is actually thinking about going to a [facility] pharmacy due to allergy to dyes.     She has been having \"slight\" headaches which do not disrupt her activity.   No anxiety or tremors.     Returns in 12/2021 to discuss workup to date.     ROS: 10 point ROS neg other than the symptoms noted above in the HPI.     Exam:   GENERAL: Healthy, alert and no distress  EYES: Eyes grossly normal to inspection.  No discharge or erythema, or obvious scleral/conjunctival abnormalities.  HENT: Normal cephalic/atraumatic.  External ears, nose and mouth without ulcers or lesions.  No nasal drainage visible.  RESP: No audible wheeze, cough, or visible cyanosis.  No visible retractions or increased work of breathing.    MS: No gross musculoskeletal defects noted.  Normal range of motion.  No visible edema.  SKIN: Visible skin clear. No significant rash, abnormal pigmentation or lesions.  NEURO: Cranial nerves grossly intact.  Mentation and speech appropriate for age.  PSYCH: Mentation appears normal, affect normal/bright, judgement and insight intact, " normal speech and appearance well-groomed.       A/P:   HTN - Workup to date remarkable for elevated plasma metanephrine and elevated serum aldosterone. The renin level is not suppressed. Metanephrine elevation not high enough to be diagnostic for pheochromocytoma. Her TSH is normal. She is currently on triamterene.  Repeat urine metanephrines normal. Urine cortisol was normal. Discussed an oral salt loading test to rule in/out primary  hyperaldosteronism. Patient notes she is very sensitive to salt and worries about uncontrolled HTN during this test. She is worried that she was on spironolactone before and she feels it caused facial hair growth. Explained that spironolactone is used to prevent facial hair growth and I suspect her response was due to another factor.   -Continue hydrochlorothiazide, lisinopril.   -Continue potassium for now.   -Stop triamterene.   -Start spironolactone 100 mg every day.   -Labs and nurse blood pressure check in 2 weeks.       Video start 3:05 PM   Video end 3:23 PM   [facility].   Patient at home    Raheem Mcneill MD on 12/29/2021 at 3:23 PM        Again, thank you for allowing me to participate in the care of your patient.        Sincerely,        Raheem Mcneill MD

## 2021-12-29 NOTE — TELEPHONE ENCOUNTER
[facility]    Phone Message    May a detailed message be left on voicemail: yes     Reason for Call: Medication Question or concern regarding medication   Prescription Clarification  Name of Medication:spironolactone (ALDACTONE) 100 MG tablet  Prescribing Provider: Dr. Mcneill   Pharmacy: [facility] [address]   What on the order needs clarification? Per pharmacy this medication is flag against taking with patients RX for Potassium and Lisinopril pharmacy requesting a call back to confirm if doctor is aware that patient is taking these medications together.  Please advise .           Action Taken: Other: ENDO    Travel Screening: Not Applicable

## 2021-12-29 NOTE — PROGRESS NOTES
"Kayleigh Sousa  is a 66 year old year old female who is being evaluated via a billable video visit.      How would you like to obtain your AVS? Britney  If the video visit is dropped, the invitation should be resent by: Text to cell phone: [email or web address]---[phone]  Will anyone else be joining your video visit? No     S:   Patient presents for evaluation of HTN.   Began treatment at age 35.   Shortly after she developed HTN, she needed to start on potassium supplementation.     No recent change in weight.   No skin or hair changes.   Notes she has been having issues with sensitive skin and hives recently.   She has undergone allergy testing. She is actually thinking about going to a [facility] due to allergy to dyes.     She has been having \"slight\" headaches which do not disrupt her activity.   No anxiety or tremors.     Returns in 12/2021 to discuss workup to date.     ROS: 10 point ROS neg other than the symptoms noted above in the HPI.     Exam:   GENERAL: Healthy, alert and no distress  EYES: Eyes grossly normal to inspection.  No discharge or erythema, or obvious scleral/conjunctival abnormalities.  HENT: Normal cephalic/atraumatic.  External ears, nose and mouth without ulcers or lesions.  No nasal drainage visible.  RESP: No audible wheeze, cough, or visible cyanosis.  No visible retractions or increased work of breathing.    MS: No gross musculoskeletal defects noted.  Normal range of motion.  No visible edema.  SKIN: Visible skin clear. No significant rash, abnormal pigmentation or lesions.  NEURO: Cranial nerves grossly intact.  Mentation and speech appropriate for age.  PSYCH: Mentation appears normal, affect normal/bright, judgement and insight intact, normal speech and appearance well-groomed.       A/P:   HTN - Workup to date remarkable for elevated plasma metanephrine and elevated serum aldosterone. The renin level is not suppressed. Metanephrine elevation not high enough to be diagnostic for " pheochromocytoma. Her TSH is normal. She is currently on triamterene.  Repeat urine metanephrines normal. Urine cortisol was normal. Discussed an oral salt loading test to rule in/out primary  hyperaldosteronism. Patient notes she is very sensitive to salt and worries about uncontrolled HTN during this test. She is worried that she was on spironolactone before and she feels it caused facial hair growth. Explained that spironolactone is used to prevent facial hair growth and I suspect her response was due to another factor.   -Continue hydrochlorothiazide, lisinopril.   -Continue potassium for now.   -Stop triamterene.   -Start spironolactone 100 mg every day.   -Labs and nurse blood pressure check in 2 weeks.       Video start 3:05 PM   Video end 3:23 PM   [employer].   Patient at home    Raheem Mcneill MD on 12/29/2021 at 3:23 PM

## 2021-12-29 NOTE — TELEPHONE ENCOUNTER
Pharmacy calling about drug interaction.  See details below:        Edmar DUBOIS RN....12/29/2021 4:21 PM

## 2021-12-30 NOTE — TELEPHONE ENCOUNTER
Contacted [facility] pharmacy and spoke to pharmacist Lester. Provided verbal okay to take spironolactone per Dr. Mcneill.    Edmar DUBOIS RN....12/30/2021 1:16 PM

## 2021-12-30 NOTE — TELEPHONE ENCOUNTER
Per routing comment:    I know of that issue and plan to monitor her with lab in two weeks.   Ok to take medication.     Raheem Mcneill MD on 12/30/2021 at 12:40 PM

## 2022-01-10 NOTE — PATIENT INSTRUCTIONS
RTC MD 4 months w/ labs -- 4/7/22 at 10am, 10:45am    Ana Rodrigez, RN, BSN, OCN  Nurse Care Coordinator  [facility] [place]  P: [phone]     F: [phone]

## 2022-01-14 ENCOUNTER — LAB (OUTPATIENT)
Dept: LAB | Facility: CLINIC | Age: 67
End: 2022-01-14
Payer: COMMERCIAL

## 2022-01-14 DIAGNOSIS — E26.9 HYPERALDOSTERONISM (H): ICD-10-CM

## 2022-01-14 DIAGNOSIS — I10 ESSENTIAL HYPERTENSION: ICD-10-CM

## 2022-01-14 LAB
ANION GAP SERPL CALCULATED.3IONS-SCNC: 5 MMOL/L (ref 3–14)
BUN SERPL-MCNC: 15 MG/DL (ref 7–30)
CALCIUM SERPL-MCNC: 9.7 MG/DL (ref 8.5–10.1)
CHLORIDE BLD-SCNC: 98 MMOL/L (ref 94–109)
CO2 SERPL-SCNC: 27 MMOL/L (ref 20–32)
CREAT SERPL-MCNC: 1.26 MG/DL (ref 0.52–1.04)
GFR SERPL CREATININE-BSD FRML MDRD: 47 ML/MIN/1.73M2
GLUCOSE BLD-MCNC: 125 MG/DL (ref 70–99)
POTASSIUM BLD-SCNC: 4.3 MMOL/L (ref 3.4–5.3)
SODIUM SERPL-SCNC: 130 MMOL/L (ref 133–144)

## 2022-01-14 PROCEDURE — 36415 COLL VENOUS BLD VENIPUNCTURE: CPT

## 2022-01-14 PROCEDURE — 80048 BASIC METABOLIC PNL TOTAL CA: CPT

## 2022-01-18 ENCOUNTER — ALLIED HEALTH/NURSE VISIT (OUTPATIENT)
Dept: INTERNAL MEDICINE | Facility: CLINIC | Age: 67
End: 2022-01-18
Payer: COMMERCIAL

## 2022-01-18 VITALS — DIASTOLIC BLOOD PRESSURE: 89 MMHG | SYSTOLIC BLOOD PRESSURE: 135 MMHG

## 2022-01-18 DIAGNOSIS — I10 ESSENTIAL HYPERTENSION: Primary | ICD-10-CM

## 2022-01-18 PROCEDURE — 99207 PR NO CHARGE NURSE ONLY: CPT

## 2022-04-14 ENCOUNTER — TRANSFERRED RECORDS (OUTPATIENT)
Dept: HEALTH INFORMATION MANAGEMENT | Facility: CLINIC | Age: 67
End: 2022-04-14
Payer: COMMERCIAL

## 2022-04-23 ENCOUNTER — TRANSFERRED RECORDS (OUTPATIENT)
Dept: HEALTH INFORMATION MANAGEMENT | Facility: CLINIC | Age: 67
End: 2022-04-23

## 2022-05-25 ENCOUNTER — LAB (OUTPATIENT)
Dept: ONCOLOGY | Facility: CLINIC | Age: 67
End: 2022-05-25
Attending: INTERNAL MEDICINE
Payer: COMMERCIAL

## 2022-05-25 VITALS
OXYGEN SATURATION: 100 % | BODY MASS INDEX: 25.43 KG/M2 | DIASTOLIC BLOOD PRESSURE: 75 MMHG | HEART RATE: 63 BPM | TEMPERATURE: 96.7 F | WEIGHT: 138.2 LBS | SYSTOLIC BLOOD PRESSURE: 126 MMHG | HEIGHT: 62 IN | RESPIRATION RATE: 16 BRPM

## 2022-05-25 DIAGNOSIS — C50.412 MALIGNANT NEOPLASM OF UPPER-OUTER QUADRANT OF LEFT BREAST IN FEMALE, ESTROGEN RECEPTOR POSITIVE (H): Primary | ICD-10-CM

## 2022-05-25 DIAGNOSIS — Z17.0 MALIGNANT NEOPLASM OF UPPER-OUTER QUADRANT OF LEFT BREAST IN FEMALE, ESTROGEN RECEPTOR POSITIVE (H): Primary | ICD-10-CM

## 2022-05-25 DIAGNOSIS — Z17.0 MALIGNANT NEOPLASM OF UPPER-OUTER QUADRANT OF LEFT BREAST IN FEMALE, ESTROGEN RECEPTOR POSITIVE (H): ICD-10-CM

## 2022-05-25 DIAGNOSIS — C50.412 MALIGNANT NEOPLASM OF UPPER-OUTER QUADRANT OF LEFT BREAST IN FEMALE, ESTROGEN RECEPTOR POSITIVE (H): ICD-10-CM

## 2022-05-25 LAB
ALBUMIN SERPL-MCNC: 4.3 G/DL (ref 3.4–5)
ALP SERPL-CCNC: 105 U/L (ref 40–150)
ALT SERPL W P-5'-P-CCNC: 27 U/L (ref 0–50)
ANION GAP SERPL CALCULATED.3IONS-SCNC: <1 MMOL/L (ref 3–14)
AST SERPL W P-5'-P-CCNC: 28 U/L (ref 0–45)
BILIRUB SERPL-MCNC: 0.7 MG/DL (ref 0.2–1.3)
BUN SERPL-MCNC: 37 MG/DL (ref 7–30)
CALCIUM SERPL-MCNC: 10.3 MG/DL (ref 8.5–10.1)
CANCER AG27-29 SERPL-ACNC: 17 U/ML (ref 0–39)
CHLORIDE BLD-SCNC: 106 MMOL/L (ref 94–109)
CO2 SERPL-SCNC: 29 MMOL/L (ref 20–32)
CREAT SERPL-MCNC: 1.47 MG/DL (ref 0.52–1.04)
ERYTHROCYTE [DISTWIDTH] IN BLOOD BY AUTOMATED COUNT: 12.9 % (ref 10–15)
GFR SERPL CREATININE-BSD FRML MDRD: 39 ML/MIN/1.73M2
GLUCOSE BLD-MCNC: 103 MG/DL (ref 70–99)
HCT VFR BLD AUTO: 46.4 % (ref 35–47)
HGB BLD-MCNC: 14.4 G/DL (ref 11.7–15.7)
MCH RBC QN AUTO: 29.9 PG (ref 26.5–33)
MCHC RBC AUTO-ENTMCNC: 31 G/DL (ref 31.5–36.5)
MCV RBC AUTO: 96 FL (ref 78–100)
PLATELET # BLD AUTO: 225 10E3/UL (ref 150–450)
POTASSIUM BLD-SCNC: 5.2 MMOL/L (ref 3.4–5.3)
PROT SERPL-MCNC: 8.7 G/DL (ref 6.8–8.8)
RBC # BLD AUTO: 4.82 10E6/UL (ref 3.8–5.2)
SODIUM SERPL-SCNC: 135 MMOL/L (ref 133–144)
WBC # BLD AUTO: 3.2 10E3/UL (ref 4–11)

## 2022-05-25 PROCEDURE — 86300 IMMUNOASSAY TUMOR CA 15-3: CPT | Performed by: INTERNAL MEDICINE

## 2022-05-25 PROCEDURE — G0463 HOSPITAL OUTPT CLINIC VISIT: HCPCS

## 2022-05-25 PROCEDURE — 80053 COMPREHEN METABOLIC PANEL: CPT | Performed by: INTERNAL MEDICINE

## 2022-05-25 PROCEDURE — 99214 OFFICE O/P EST MOD 30 MIN: CPT | Performed by: INTERNAL MEDICINE

## 2022-05-25 PROCEDURE — 85027 COMPLETE CBC AUTOMATED: CPT | Performed by: INTERNAL MEDICINE

## 2022-05-25 PROCEDURE — 36415 COLL VENOUS BLD VENIPUNCTURE: CPT

## 2022-05-25 ASSESSMENT — PAIN SCALES - GENERAL: PAINLEVEL: NO PAIN (0)

## 2022-05-25 NOTE — PROGRESS NOTES
Visit Date: 05/25/2022    Kayleigh Sousa is a 66-year-old patient who comes in today for interim followup.    CHIEF COMPLAINT:    1.  Right-sided DCIS diagnosed in 2018, status post lumpectomy and radiation.   2.  Left-sided breast cancer diagnosed in 2019 in the upper outer quadrant, estrogen and progesterone receptor positive, HER2 receptor negative, stage of disease T2 N1 M0, status post bilateral mastectomies.   3.  Oncotype in the low risk with a score of 9.    HISTORY OF PRESENT ILLNESS:  Kayleigh is now 66 years old.  She is postmenopausal.  She has got a history of bilateral breast cancer as outlined above and she is now status post bilateral mastectomies.  Her most recent cancer was on the left side, it was a T2 N1 tumor, ER positive, NV positive, HER-2 receptor negative.  She had an Oncotype sent out, which came back with a recurrence score of 9.  She had post-mastectomy radiation therapy and then started on anastrozole; however, she did not do very long on the anastrozole because she started having problems with her blood pressure and other side effects from it.  She also has some problems with kidney dysfunction and she has already seen a nephrologist and her blood pressure is now under control.  We have discussed today whether or not she wants to proceed with adjuvant hormonal therapy based on her Oncotype score and the side effect profile she has had with it in the past.  We have discussed another option for an aromatase inhibitor such as Aromasin.  We have also discussed tamoxifen and we have discussed the risks, benefits, and side effects of both of these drugs.  I have also given her close observation as an option and she has elected to do close observation.  I will respect her wishes on that.  We will see her on a 6-monthly basis, check physical exam and blood work and do appropriate scanning if needed.    FAMILY HISTORY:  First cousin and paternal cousin with breast cancer.    PAST MEDICAL HISTORY:   History of hyperaldosteronism, history of chronic renal insufficiency, history of hypertension.    PAST SURGICAL HISTORY:  History of bilateral mastectomies, history of right-sided lumpectomy, history of appendectomy, history of foot surgery.    MEDICATIONS AND ALLERGIES:  Outlined in the nursing records.    COMPREHENSIVE REVIEW OF SYSTEMS:  A 12-point comprehensive review of systems is unremarkable.  She denies anything sinister today.    SOCIAL HISTORY:  The patient is .  She has two children in their 40s.    PHYSICAL EXAMINATION:   GENERAL:  She is well-appearing lady in no acute distress.  VITAL SIGNS:  Stable.    NECK:  No masses or goiter.  CHEST:  Clear to auscultation and percussion bilaterally.  CARDIOVASCULAR:  Heart sounds 1, 2 normal, no added sounds, no murmurs.  BREASTS:  The patient is status post bilateral mastectomy.  Scars look good.  Right and left axillae negative.  ABDOMEN:  Soft and nontender.  No hepatosplenomegaly.   EXTREMITIES:  Legs without tenderness or edema.   NEUROLOGIC:  Grossly intact.    SKIN:  No rashes or lesions.    DATA REVIEW:  White cell count 3.2, hemoglobin 14.4, platelets 225.  The rest of her blood work is pending at time of dictation.    IMPRESSION AND PLAN:   This is a 66-year-old patient with bilateral breast disease, previous history of right-sided ductal carcinoma in situ and more recently a diagnosis of left-sided breast cancer as outlined above with a low score on the Oncotype.  For reasons as outlined above, she is not on any at hormonal therapy.  She has elected for close observation.  We will keep an eye on her labs and her physical exam and any symptomatology.  All of the above has been discussed with her today.    Victor M Vargas MD        D: 2022   T: 2022   MT: SHANDRA    Name:     OTF BURGOS  MRN:      [id number]        Account:    [id number]   :      1955           Visit Date: 2022     Document: [id number]

## 2022-05-25 NOTE — LETTER
5/25/2022         RE: Kayleigh Sousa  [address] [place] [address]        Dear Colleague,    Thank you for referring your patient, Kayleigh Sousa, to the [facility]. Please see a copy of my visit note below.    Visit Date: 05/25/2022    Kayleigh Sousa is a 66-year-old patient who comes in today for interim followup.    CHIEF COMPLAINT:    1.  Right-sided DCIS diagnosed in 2018, status post lumpectomy and radiation.   2.  Left-sided breast cancer diagnosed in 2019 in the upper outer quadrant, estrogen and progesterone receptor positive, HER2 receptor negative, stage of disease T2 N1 M0, status post bilateral mastectomies.   3.  Oncotype in the low risk with a score of 9.    HISTORY OF PRESENT ILLNESS:  Kayleigh is now 66 years old.  She is postmenopausal.  She has got a history of bilateral breast cancer as outlined above and she is now status post bilateral mastectomies.  Her most recent cancer was on the left side, it was a T2 N1 tumor, ER positive, OH positive, HER-2 receptor negative.  She had an Oncotype sent out, which came back with a recurrence score of 9.  She had post-mastectomy radiation therapy and then started on anastrozole; however, she did not do very long on the anastrozole because she started having problems with her blood pressure and other side effects from it.  She also has some problems with kidney dysfunction and she has already seen a nephrologist and her blood pressure is now under control.  We have discussed today whether or not she wants to proceed with adjuvant hormonal therapy based on her Oncotype score and the side effect profile she has had with it in the past.  We have discussed another option for an aromatase inhibitor such as Aromasin.  We have also discussed tamoxifen and we have discussed the risks, benefits, and side effects of both of these drugs.  I have also given her close observation as an option and she has elected to do close  observation.  I will respect her wishes on that.  We will see her on a 6-monthly basis, check physical exam and blood work and do appropriate scanning if needed.    FAMILY HISTORY:  First cousin and paternal cousin with breast cancer.    PAST MEDICAL HISTORY:  History of hyperaldosteronism, history of chronic renal insufficiency, history of hypertension.    PAST SURGICAL HISTORY:  History of bilateral mastectomies, history of right-sided lumpectomy, history of appendectomy, history of foot surgery.    MEDICATIONS AND ALLERGIES:  Outlined in the nursing records.    COMPREHENSIVE REVIEW OF SYSTEMS:  A 12-point comprehensive review of systems is unremarkable.  She denies anything sinister today.    SOCIAL HISTORY:  The patient is .  She has two children in their 40s.    PHYSICAL EXAMINATION:   GENERAL:  She is well-appearing lady in no acute distress.  VITAL SIGNS:  Stable.    NECK:  No masses or goiter.  CHEST:  Clear to auscultation and percussion bilaterally.  CARDIOVASCULAR:  Heart sounds 1, 2 normal, no added sounds, no murmurs.  BREASTS:  The patient is status post bilateral mastectomy.  Scars look good.  Right and left axillae negative.  ABDOMEN:  Soft and nontender.  No hepatosplenomegaly.   EXTREMITIES:  Legs without tenderness or edema.   NEUROLOGIC:  Grossly intact.    SKIN:  No rashes or lesions.    DATA REVIEW:  White cell count 3.2, hemoglobin 14.4, platelets 225.  The rest of her blood work is pending at time of dictation.    IMPRESSION AND PLAN:   This is a 66-year-old patient with bilateral breast disease, previous history of right-sided ductal carcinoma in situ and more recently a diagnosis of left-sided breast cancer as outlined above with a low score on the Oncotype.  For reasons as outlined above, she is not on any at hormonal therapy.  She has elected for close observation.  We will keep an eye on her labs and her physical exam and any symptomatology.  All of the above has been discussed  with her today.    Victor M Vargas MD        D: 2022   T: 2022   MT: SHANDRA    Name:     OTF BURGOS  MRN:      [id number]        Account:    [id number]   :      1955           Visit Date: 2022     Document: [id number]      Again, thank you for allowing me to participate in the care of your patient.        Sincerely,        Victor M Vargas MD

## 2022-05-25 NOTE — NURSING NOTE
"Oncology Rooming Note    May 25, 2022 8:09 AM   Kayleigh Sousa is a 66 year old female who presents for:    Chief Complaint   Patient presents with     Oncology Clinic Visit     Invasive ductal carcinoma of breast, female, left      Initial Vitals: /75   Pulse 63   Temp (!) 96.7  F (35.9  C) (Tympanic)   Resp 16   Ht 1.581 m (5' 2.25\")   Wt 62.7 kg (138 lb 3.2 oz)   LMP  (LMP Unknown)   SpO2 100%   BMI 25.07 kg/m   Estimated body mass index is 25.07 kg/m  as calculated from the following:    Height as of this encounter: 1.581 m (5' 2.25\").    Weight as of this encounter: 62.7 kg (138 lb 3.2 oz). Body surface area is 1.66 meters squared.  No Pain (0) Comment: Data Unavailable   No LMP recorded (lmp unknown). Patient is postmenopausal.  Allergies reviewed: Yes  Medications reviewed: Yes    Medications: Medication refills not needed today.  Pharmacy name entered into [employer]:    [email or web address] [address] [facility] [id number] [facility]    Clinical concerns: f/u       Lois Higgins CMA              "

## 2022-05-25 NOTE — PROGRESS NOTES
Medical Assistant Note:  Kayleigh Sousa presents today for blood draw.    Patient seen by provider today: Yes: Dr Vargas.   present during visit today: Not Applicable.    Concerns: No Concerns.    Procedure:  Labs drawn    Post Assessment:  Labs drawn without difficulty: Yes.    Discharge Plan:  Departure Mode: Ambulatory.    Face to Face Time: 10 min.    Raquel Ricardo CMA

## 2022-06-05 ENCOUNTER — HEALTH MAINTENANCE LETTER (OUTPATIENT)
Age: 67
End: 2022-06-05

## 2022-06-18 NOTE — PATIENT INSTRUCTIONS
RTC MD 6 months -- 11/29/22 at 10:30am    Ana Rodrigez, RN, BSN, OCN  Nurse Care Coordinator  [facility] [place]  P: [phone]     F: [phone]

## 2022-06-27 ENCOUNTER — OFFICE VISIT (OUTPATIENT)
Dept: INTERNAL MEDICINE | Facility: CLINIC | Age: 67
End: 2022-06-27
Payer: COMMERCIAL

## 2022-06-27 VITALS
WEIGHT: 136.6 LBS | OXYGEN SATURATION: 100 % | HEART RATE: 82 BPM | RESPIRATION RATE: 17 BRPM | DIASTOLIC BLOOD PRESSURE: 82 MMHG | HEIGHT: 62 IN | SYSTOLIC BLOOD PRESSURE: 130 MMHG | BODY MASS INDEX: 25.14 KG/M2 | TEMPERATURE: 97.6 F

## 2022-06-27 DIAGNOSIS — E26.9 HYPERALDOSTERONISM (H): ICD-10-CM

## 2022-06-27 DIAGNOSIS — N18.32 STAGE 3B CHRONIC KIDNEY DISEASE (H): ICD-10-CM

## 2022-06-27 DIAGNOSIS — E83.52 SERUM CALCIUM ELEVATED: ICD-10-CM

## 2022-06-27 DIAGNOSIS — Z17.0 MALIGNANT NEOPLASM OF UPPER-OUTER QUADRANT OF LEFT BREAST IN FEMALE, ESTROGEN RECEPTOR POSITIVE (H): ICD-10-CM

## 2022-06-27 DIAGNOSIS — R73.03 PREDIABETES: ICD-10-CM

## 2022-06-27 DIAGNOSIS — Z00.00 ENCOUNTER FOR MEDICARE ANNUAL WELLNESS EXAM: Primary | ICD-10-CM

## 2022-06-27 DIAGNOSIS — C50.412 MALIGNANT NEOPLASM OF UPPER-OUTER QUADRANT OF LEFT BREAST IN FEMALE, ESTROGEN RECEPTOR POSITIVE (H): ICD-10-CM

## 2022-06-27 DIAGNOSIS — Z12.11 SCREENING FOR COLON CANCER: ICD-10-CM

## 2022-06-27 LAB
HBA1C MFR BLD: 5.1 % (ref 0–5.6)
PTH-INTACT SERPL-MCNC: 46 PG/ML (ref 15–65)

## 2022-06-27 PROCEDURE — 80061 LIPID PANEL: CPT | Performed by: INTERNAL MEDICINE

## 2022-06-27 PROCEDURE — 83036 HEMOGLOBIN GLYCOSYLATED A1C: CPT | Performed by: INTERNAL MEDICINE

## 2022-06-27 PROCEDURE — 99214 OFFICE O/P EST MOD 30 MIN: CPT | Mod: 25 | Performed by: INTERNAL MEDICINE

## 2022-06-27 PROCEDURE — 80048 BASIC METABOLIC PNL TOTAL CA: CPT | Performed by: INTERNAL MEDICINE

## 2022-06-27 PROCEDURE — 83970 ASSAY OF PARATHORMONE: CPT | Performed by: INTERNAL MEDICINE

## 2022-06-27 PROCEDURE — 36415 COLL VENOUS BLD VENIPUNCTURE: CPT | Performed by: INTERNAL MEDICINE

## 2022-06-27 PROCEDURE — 99397 PER PM REEVAL EST PAT 65+ YR: CPT | Performed by: INTERNAL MEDICINE

## 2022-06-27 ASSESSMENT — ENCOUNTER SYMPTOMS
WEAKNESS: 0
PARESTHESIAS: 0
FEVER: 0
HEMATURIA: 0
NERVOUS/ANXIOUS: 0
ARTHRALGIAS: 0
CHILLS: 0
HEMATOCHEZIA: 0
DYSURIA: 0
HEADACHES: 0
HEARTBURN: 0
PALPITATIONS: 0
ABDOMINAL PAIN: 0
NAUSEA: 0
SHORTNESS OF BREATH: 0
COUGH: 0
CONSTIPATION: 0
SORE THROAT: 0
MYALGIAS: 0
JOINT SWELLING: 0
BREAST MASS: 0
DIZZINESS: 0
EYE PAIN: 0
FREQUENCY: 0
DIARRHEA: 0

## 2022-06-27 ASSESSMENT — ACTIVITIES OF DAILY LIVING (ADL): CURRENT_FUNCTION: NO ASSISTANCE NEEDED

## 2022-06-27 NOTE — NURSING NOTE
"/82   Pulse 82   Temp 97.6  F (36.4  C) (Tympanic)   Resp 17   Ht 1.581 m (5' 2.25\")   Wt 62 kg (136 lb 9.6 oz)   LMP  (LMP Unknown)   SpO2 100%   BMI 24.78 kg/m    Patient in for Medicare Visit.  "

## 2022-06-27 NOTE — PROGRESS NOTES
"SUBJECTIVE:   Kayleigh Sousa is a 66 year old female who regularly sees Dr. Paulino presents for Preventive Visit.      Patient has been advised of split billing requirements and indicates understanding: Yes  Are you in the first 12 months of your Medicare coverage?  No    Healthy Habits:     In general, how would you rate your overall health?  Good    Frequency of exercise:  4-5 days/week    Duration of exercise:  30-45 minutes    Do you usually eat at least 4 servings of fruit and vegetables a day, include whole grains    & fiber and avoid regularly eating high fat or \"junk\" foods?  Yes    Ability to successfully perform activities of daily living:  No assistance needed    Home Safety:  No safety concerns identified    Hearing Impairment:  No hearing concerns    In the past 6 months, have you been bothered by leaking of urine?  No    In general, how would you rate your overall mental or emotional health?  Good      PHQ-2 Total Score: 0    Do you feel safe in your environment? Yes    Have you ever done Advance Care Planning? (For example, a Health Directive, POLST, or a discussion with a medical provider or your loved ones about your wishes): Yes, advance care planning is on file.       Fall risk  Fallen 2 or more times in the past year?: No  Any fall with injury in the past year?: No    Cognitive Screening   1) Repeat 3 items (Leader, Season, Table)    2) Clock draw: NORMAL  3) 3 item recall: Recalls 3 objects  Results: 3 items recalled: COGNITIVE IMPAIRMENT LESS LIKELY    Mini-CogTM Copyright LUCIANO Santos. Licensed by the author for use in [facility]; reprinted with permission (sheree@.[facility]). All rights reserved.      Do you have sleep apnea, excessive snoring or daytime drowsiness?: no    Reviewed and updated as needed this visit by clinical staff    Allergies  Meds                Reviewed and updated as needed this visit by Provider                   Past Medical History:   Diagnosis Date     Benign " essential hypertension      Leukopenia     mild-3100 wbc     Malignant neoplasm of upper-outer quadrant of left breast in female, estrogen receptor positive (H)      PAC (premature atrial contraction)        Past Surgical History:   Procedure Laterality Date     APPENDECTOMY  2011     APPENDECTOMY  2011     CYST REMOVAL Right     axillary cyst drainage     LUMPECTOMY BREAST Right 2008    DCIS tumorwas ER/GA positive, radiation follewed lumpectomy     MASTECTOMY Bilateral 2019     MASTECTOMY       NM SENTINEL NODE INJECTION  9/25/2019     GA MASTECTOMY, SIMPLE, COMPLETE Bilateral 9/25/2019    Procedure: Bilateral Mastectomies with Left Hutchinson Lymph Node Biopsy, Left axillary node dissection 23HR STAY;  Surgeon: Beba Hernandez MD;  Location: [facility];  Service: General     TOE SURGERY Right        Current Outpatient Medications   Medication Sig Dispense Refill     amLODIPine (NORVASC) 10 MG tablet Take 1 tablet (10 mg) by mouth daily 90 tablet 3     Ascorbic Acid (VITAMIN C) 500 MG CAPS Take 500 mg by mouth daily       Ferrous Sulfate (IRON PO) Take 1 tablet by mouth daily 36mg per tablet       hydrochlorothiazide (HYDRODIURIL) 12.5 MG tablet Take 2 tablets (25 mg) by mouth daily 180 tablet 3     lisinopril (ZESTRIL) 40 MG tablet Take 1 tablet (40 mg) by mouth daily 90 tablet 3     potassium chloride ER (KLOR-CON M) 20 MEQ CR tablet Take 1 tablet by mouth once daily       spironolactone (ALDACTONE) 100 MG tablet Take 1 tablet (100 mg) by mouth daily 30 tablet 11     Zinc 30 MG CAPS Take 1 capsule by mouth daily         .  Family History   Problem Relation Age of Onset     Cerebrovascular Disease Mother      Hypertension Mother      Diabetes Mother      Diabetes Father      Hypertension Father      Hypertension Sister      Hypertension Brother      Hypertension Sister      Hypertension Sister      Diabetes Sister      Coronary Artery Disease Father      Breast Cancer Cousin      Brain Cancer Cousin         Social History     Tobacco Use     Smoking status: Never Smoker     Smokeless tobacco: Never Used   Substance Use Topics     Alcohol use: Not Currently     If you drink alcohol do you typically have >3 drinks per day or >7 drinks per week? No    Alcohol Use 6/27/2022   Prescreen: >3 drinks/day or >7 drinks/week? Not Applicable   No flowsheet data found.        Hypertension Follow-up      Do you check your blood pressure regularly outside of the clinic? No     Are you following a low salt diet? Yes    Are your blood pressures ever more than 140 on the top number (systolic) OR more   than 90 on the bottom number (diastolic), for example 140/90? No    History of Hyperaldosteronism: Follows up with endocrinologist and is on spironolactone 100 mg daily, patient states her endocrinologist is retiring and would like to establish with a new endocrinologist.    History of breast cancer s/p bilateral mastectomy follows up with oncologist    Due for colonoscopy and requesting [facility] referral      Current providers sharing in care for this patient include:   Patient Care Team:  Carrillo Wills MD as PCP - General (Internal Medicine)  Yanna Thompson MD as MD (Hematology & Oncology)  Jere Brown MD as MD (Hematology & Oncology)  Juarez, Tacho Fernandez MD as Assigned Heart and Vascular Provider  Raheem Mcneill MD as Assigned Endocrinology Provider  Carrillo Wills MD as Assigned PCP  Linda Waller LP, [facility] as Assigned Behavioral Health Provider  Sissy Lerma OD as Assigned Surgical Provider  Victor M Vargas MD as Assigned Cancer Care Provider  Lizet Doll [facility] as Assigned [place] Pharmacist    The following health maintenance items are reviewed in Epic and correct as of today:  Health Maintenance Due   Topic Date Due     PARATHYROID  Never done     PHOSPHORUS  Never done     ZOSTER IMMUNIZATION (1 of 2) 08/14/2017     MICROALBUMIN  08/24/2021     ANNUAL REVIEW OF  ORDERS   "02/24/2022     MEDICARE ANNUAL WELLNESS VISIT  04/27/2022     LIPID  04/27/2022         Pertinent mammograms are reviewed under the imaging tab.    Review of Systems   Constitutional: Negative for chills and fever.   HENT: Negative for congestion, ear pain, hearing loss and sore throat.    Eyes: Negative for pain and visual disturbance.   Respiratory: Negative for cough and shortness of breath.    Cardiovascular: Negative for chest pain, palpitations and peripheral edema.   Gastrointestinal: Negative for abdominal pain, constipation, diarrhea, heartburn, hematochezia and nausea.   Breasts:  Negative for tenderness, breast mass and discharge.   Genitourinary: Negative for dysuria, frequency, genital sores, hematuria, pelvic pain, urgency, vaginal bleeding and vaginal discharge.   Musculoskeletal: Negative for arthralgias, joint swelling and myalgias.   Skin: Negative for rash.   Neurological: Negative for dizziness, weakness, headaches and paresthesias.   Psychiatric/Behavioral: Negative for mood changes. The patient is not nervous/anxious.         OBJECTIVE:   /82   Pulse 82   Temp 97.6  F (36.4  C) (Tympanic)   Resp 17   Ht 1.581 m (5' 2.25\")   Wt 62 kg (136 lb 9.6 oz)   LMP  (LMP Unknown)   SpO2 100%   BMI 24.78 kg/m   Estimated body mass index is 25.07 kg/m  as calculated from the following:    Height as of 5/25/22: 1.581 m (5' 2.25\").    Weight as of 5/25/22: 62.7 kg (138 lb 3.2 oz).  Physical Exam  GENERAL APPEARANCE: healthy, alert and no distress  EYES: Eyes grossly normal to inspection, PERRL and conjunctivae and sclerae normal  NECK: no adenopathy, no asymmetry, masses, or scars and thyroid normal to palpation  RESP: lungs clear to auscultation - no rales, rhonchi or wheezes  CV: regular rate and rhythm, normal S1 S2,    ABDOMEN: soft, nontender, no hepatosplenomegaly, no masses and bowel sounds normal  MS: no musculoskeletal defects are noted and gait is age appropriate without ataxia  NEURO: " Normal strength and tone, sensory exam grossly normal, mentation intact and speech normal  PSYCH: mentation appears normal and affect normal/bright    Diagnostic Test Results:  Labs done by oncologist reviewed in Epic    ASSESSMENT / PLAN:       (Z00.00) Encounter for Medicare annual wellness exam  (primary encounter diagnosis)  Plan: Had a CBC CMP at oncology clinic, check lipid panel reflex to direct LDL Fasting, due for colonoscopy-ordered, patient declines pneumonia vaccine and Shingrix          (R73.03) Prediabetes  Plan: Hemoglobin A1c          (E83.52) Serum calcium elevated  Comment: Patient to start taking calcium supplements  Plan: Recheck calcium, basic metabolic panel, Parathyroid Hormone         Intact       (Z12.11) Screening for colon cancer  Plan: Last colonoscopy 2014 and was advised to get colonoscopy in 5 years but patient did not do it.  Referred to  Adult GI  Referral - Procedure Only          (E26.9) Hyperaldosteronism (H)  Plan: On spironolactone 100 mg daily and follows up with endocrinologist but the patient states her endocrinologist retired and would like to find new endocrinologist referral done.    (C50.412,  Z17.0) Malignant neoplasm of upper-outer quadrant of left breast in female, estrogen receptor positive (H)  Plan: S/p bilateral mastectomy follows up with oncologist    (N18.32) Stage 3b chronic kidney disease (H)  Plan: Last creatinine 1.47, recheck creatinine today, avoid nephrotoxins.pt was told I will contact her after results and proceed accordingly.       Patient has been advised of split billing requirements and indicates understanding: Yes    COUNSELING:  Reviewed preventive health counseling, as reflected in patient instructions       Regular exercise       Healthy diet/nutrition       Immunizations    Declined: Pneumococcal and shingrix      Estimated body mass index is 24.78 kg/m  as calculated from the following:    Height as of this encounter: 1.581 m (5'  "2.25\").    Weight as of this encounter: 62 kg (136 lb 9.6 oz).        She reports that she has never smoked. She has never used smokeless tobacco.      Appropriate preventive services were discussed with this patient, including applicable screening as appropriate for cardiovascular disease, diabetes, osteopenia/osteoporosis, and glaucoma.  As appropriate for age/gender, discussed screening for colorectal cancer, prostate cancer, breast cancer, and cervical cancer. Checklist reviewing preventive services available has been given to the patient.    Reviewed patients plan of care and provided an AVS. The Basic Care Plan (routine screening as documented in Health Maintenance) for Kayleigh meets the Care Plan requirement. This Care Plan has been established and reviewed with the Patient.    Counseling Resources:  ATP IV Guidelines  Pooled Cohorts Equation Calculator  Breast Cancer Risk Calculator  Breast Cancer: Medication to Reduce Risk  FRAX Risk Assessment  ICSI Preventive Guidelines  Dietary Guidelines for Americans, 2010  USDA's MyPlate  ASA Prophylaxis  Lung CA Screening    Edmund Mittal MD  [facility]    Identified Health Risks:  "

## 2022-06-29 LAB
ANION GAP SERPL CALCULATED.3IONS-SCNC: 6 MMOL/L (ref 3–14)
BUN SERPL-MCNC: 35 MG/DL (ref 7–30)
CALCIUM SERPL-MCNC: 9.7 MG/DL (ref 8.5–10.1)
CHLORIDE BLD-SCNC: 106 MMOL/L (ref 94–109)
CHOLEST SERPL-MCNC: 194 MG/DL
CO2 SERPL-SCNC: 27 MMOL/L (ref 20–32)
CREAT SERPL-MCNC: 1.48 MG/DL (ref 0.52–1.04)
FASTING STATUS PATIENT QL REPORTED: NO
GFR SERPL CREATININE-BSD FRML MDRD: 39 ML/MIN/1.73M2
GLUCOSE BLD-MCNC: 90 MG/DL (ref 70–99)
HDLC SERPL-MCNC: 78 MG/DL
LDLC SERPL CALC-MCNC: 99 MG/DL
NONHDLC SERPL-MCNC: 116 MG/DL
POTASSIUM BLD-SCNC: 4.5 MMOL/L (ref 3.4–5.3)
SODIUM SERPL-SCNC: 139 MMOL/L (ref 133–144)
TRIGL SERPL-MCNC: 85 MG/DL

## 2022-07-01 ENCOUNTER — MYC MEDICAL ADVICE (OUTPATIENT)
Dept: INTERNAL MEDICINE | Facility: CLINIC | Age: 67
End: 2022-07-01

## 2022-07-13 ENCOUNTER — TRANSFERRED RECORDS (OUTPATIENT)
Dept: HEALTH INFORMATION MANAGEMENT | Facility: CLINIC | Age: 67
End: 2022-07-13

## 2022-10-13 ENCOUNTER — IMMUNIZATION (OUTPATIENT)
Dept: PEDIATRICS | Facility: CLINIC | Age: 67
End: 2022-10-13
Payer: COMMERCIAL

## 2022-10-13 DIAGNOSIS — Z23 HIGH PRIORITY FOR 2019-NCOV VACCINE: Primary | ICD-10-CM

## 2022-10-13 PROCEDURE — 91312 COVID-19,PF,PFIZER BOOSTER BIVALENT: CPT

## 2022-10-13 PROCEDURE — 0124A COVID-19,PF,PFIZER BOOSTER BIVALENT: CPT

## 2022-10-13 PROCEDURE — 99207 PR NO CHARGE NURSE ONLY: CPT

## 2022-10-15 ENCOUNTER — HEALTH MAINTENANCE LETTER (OUTPATIENT)
Age: 67
End: 2022-10-15

## 2022-10-24 ENCOUNTER — TELEPHONE (OUTPATIENT)
Dept: ENDOCRINOLOGY | Facility: CLINIC | Age: 67
End: 2022-10-24

## 2022-10-24 NOTE — TELEPHONE ENCOUNTER
LM for pt to c/b- to verify what pharmacy. Also to verify how much patient has left. Rx was sent on 12/29/21 for 30 days with 11 refills. Pt should have enough till december

## 2022-10-24 NOTE — TELEPHONE ENCOUNTER
Patient had an appointment with Dr. Cline on 12.20.2022, and the appointment had to be reschedule per provider being out of the office.   Patient will be out of Spironolactone and will need enough medication to last until her visit with Dr. Cline on 01.11.2023.   She is requesting for a 90 day supply if possible.     She would appreciate a call once Rx is established with the pharmacy.

## 2022-11-01 DIAGNOSIS — I10 ESSENTIAL HYPERTENSION: Primary | ICD-10-CM

## 2022-11-02 ENCOUNTER — MYC MEDICAL ADVICE (OUTPATIENT)
Dept: INTERNAL MEDICINE | Facility: CLINIC | Age: 67
End: 2022-11-02

## 2022-11-02 DIAGNOSIS — E26.9 HYPERALDOSTERONISM (H): ICD-10-CM

## 2022-11-02 DIAGNOSIS — I10 ESSENTIAL HYPERTENSION: ICD-10-CM

## 2022-11-02 RX ORDER — POTASSIUM CHLORIDE 1500 MG/1
TABLET, EXTENDED RELEASE ORAL
Qty: 90 TABLET | Refills: 0 | Status: SHIPPED | OUTPATIENT
Start: 2022-11-02 | End: 2023-01-18

## 2022-11-02 NOTE — TELEPHONE ENCOUNTER
"Outpatient Medication Detail     Disp Refills Start End ARCHANA   potassium chloride ER (KLOR-CON M) 20 MEQ CR tablet   11/3/2020  --   Sig: Take 1 tablet by mouth once daily   Class: Historical   Order: [id number]     Routing refill request to provider for review/approval because:  Medication is reported/historical  Patient needs to be seen because it has been more than 1 year since last office visit.    Last office visit provider:  10/19/21     Requested Prescriptions   Pending Prescriptions Disp Refills     potassium chloride ER (KLOR-CON M) 20 MEQ CR tablet [Pharmacy Med Name: Potassium Chloride Nataly ER 20 MEQ Oral Tablet Extended Release] 90 tablet 0     Sig: Take 1 tablet by mouth once daily       Potassium Supplements Protocol Passed - 11/2/2022  9:35 AM        Passed - Recent (12 mo) or future (30 days) visit within the authorizing provider's department     Patient has had an office visit with the authorizing provider or a provider within the authorizing providers department within the previous 12 mos or has a future within next 30 days. See \"Patient Info\" tab in inbasket, or \"Choose Columns\" in Meds & Orders section of the refill encounter.              Passed - Medication is active on med list        Passed - Patient is age 18 or older        Passed - Normal serum potassium in past 12 months     Recent Labs   Lab Test 06/27/22  0852   POTASSIUM 4.5                         Leoncio Cadet RN 11/02/22 9:37 AM  "

## 2022-11-03 RX ORDER — SPIRONOLACTONE 100 MG/1
100 TABLET, FILM COATED ORAL DAILY
Qty: 90 TABLET | Refills: 0 | Status: SHIPPED | OUTPATIENT
Start: 2022-11-03 | End: 2023-01-11

## 2022-11-29 ENCOUNTER — ONCOLOGY VISIT (OUTPATIENT)
Dept: ONCOLOGY | Facility: CLINIC | Age: 67
End: 2022-11-29
Attending: INTERNAL MEDICINE
Payer: COMMERCIAL

## 2022-11-29 VITALS
DIASTOLIC BLOOD PRESSURE: 81 MMHG | OXYGEN SATURATION: 99 % | SYSTOLIC BLOOD PRESSURE: 128 MMHG | BODY MASS INDEX: 26.13 KG/M2 | TEMPERATURE: 97.6 F | HEART RATE: 69 BPM | RESPIRATION RATE: 16 BRPM | WEIGHT: 142 LBS | HEIGHT: 62 IN

## 2022-11-29 DIAGNOSIS — Z17.0 MALIGNANT NEOPLASM OF UPPER-OUTER QUADRANT OF LEFT BREAST IN FEMALE, ESTROGEN RECEPTOR POSITIVE (H): Primary | ICD-10-CM

## 2022-11-29 DIAGNOSIS — N28.9 KIDNEY DYSFUNCTION: ICD-10-CM

## 2022-11-29 DIAGNOSIS — Z17.0 MALIGNANT NEOPLASM OF UPPER-OUTER QUADRANT OF LEFT BREAST IN FEMALE, ESTROGEN RECEPTOR POSITIVE (H): ICD-10-CM

## 2022-11-29 DIAGNOSIS — C50.412 MALIGNANT NEOPLASM OF UPPER-OUTER QUADRANT OF LEFT BREAST IN FEMALE, ESTROGEN RECEPTOR POSITIVE (H): ICD-10-CM

## 2022-11-29 DIAGNOSIS — C50.412 MALIGNANT NEOPLASM OF UPPER-OUTER QUADRANT OF LEFT BREAST IN FEMALE, ESTROGEN RECEPTOR POSITIVE (H): Primary | ICD-10-CM

## 2022-11-29 LAB
ALBUMIN SERPL BCG-MCNC: 4.6 G/DL (ref 3.5–5.2)
ALP SERPL-CCNC: 113 U/L (ref 35–104)
ALT SERPL W P-5'-P-CCNC: 18 U/L (ref 10–35)
ANION GAP SERPL CALCULATED.3IONS-SCNC: 11 MMOL/L (ref 7–15)
AST SERPL W P-5'-P-CCNC: 25 U/L (ref 10–35)
BILIRUB SERPL-MCNC: 0.5 MG/DL
BUN SERPL-MCNC: 38.5 MG/DL (ref 8–23)
CALCIUM SERPL-MCNC: 10 MG/DL (ref 8.8–10.2)
CHLORIDE SERPL-SCNC: 103 MMOL/L (ref 98–107)
CREAT SERPL-MCNC: 1.64 MG/DL (ref 0.51–0.95)
DEPRECATED HCO3 PLAS-SCNC: 25 MMOL/L (ref 22–29)
ERYTHROCYTE [DISTWIDTH] IN BLOOD BY AUTOMATED COUNT: 12.9 % (ref 10–15)
GFR SERPL CREATININE-BSD FRML MDRD: 34 ML/MIN/1.73M2
GLUCOSE SERPL-MCNC: 105 MG/DL (ref 70–99)
HCT VFR BLD AUTO: 43.4 % (ref 35–47)
HGB BLD-MCNC: 13.6 G/DL (ref 11.7–15.7)
MCH RBC QN AUTO: 30.4 PG (ref 26.5–33)
MCHC RBC AUTO-ENTMCNC: 31.3 G/DL (ref 31.5–36.5)
MCV RBC AUTO: 97 FL (ref 78–100)
PHOSPHATE SERPL-MCNC: 3.4 MG/DL (ref 2.5–4.5)
PLATELET # BLD AUTO: 227 10E3/UL (ref 150–450)
POTASSIUM SERPL-SCNC: 5.7 MMOL/L (ref 3.4–5.3)
PROT SERPL-MCNC: 7.5 G/DL (ref 6.4–8.3)
RBC # BLD AUTO: 4.48 10E6/UL (ref 3.8–5.2)
SODIUM SERPL-SCNC: 139 MMOL/L (ref 136–145)
WBC # BLD AUTO: 3.3 10E3/UL (ref 4–11)

## 2022-11-29 PROCEDURE — G0463 HOSPITAL OUTPT CLINIC VISIT: HCPCS

## 2022-11-29 PROCEDURE — 85027 COMPLETE CBC AUTOMATED: CPT | Performed by: INTERNAL MEDICINE

## 2022-11-29 PROCEDURE — 99214 OFFICE O/P EST MOD 30 MIN: CPT | Performed by: INTERNAL MEDICINE

## 2022-11-29 PROCEDURE — 36415 COLL VENOUS BLD VENIPUNCTURE: CPT

## 2022-11-29 PROCEDURE — 84100 ASSAY OF PHOSPHORUS: CPT | Performed by: INTERNAL MEDICINE

## 2022-11-29 PROCEDURE — 80053 COMPREHEN METABOLIC PANEL: CPT | Performed by: INTERNAL MEDICINE

## 2022-11-29 ASSESSMENT — PAIN SCALES - GENERAL: PAINLEVEL: NO PAIN (0)

## 2022-11-29 NOTE — PROGRESS NOTES
Medical Assistant Note:  Kayleigh Sousa presents today for blood draw.    Patient seen by provider today: Yes: Dr Vargas.   present during visit today: Not Applicable.    Concerns: No Concerns.    Procedure:  Lab draw site: rt antecub, Needle type: butterfly, Gauge: 23.    Post Assessment:  Labs drawn without difficulty: Yes.    Discharge Plan:  Departure Mode: Ambulatory.    Face to Face Time: 10 mins.    Radhika Maguire CMA, CMA

## 2022-11-29 NOTE — NURSING NOTE
"Oncology Rooming Note    November 29, 2022 10:32 AM   Kayleigh Sousa is a 67 year old female who presents for:    Chief Complaint   Patient presents with     Oncology Clinic Visit     Invasive ductal carcinoma of breast, female, left      Initial Vitals: /81   Pulse 69   Temp 97.6  F (36.4  C) (Tympanic)   Resp 16   Ht 1.581 m (5' 2.25\")   Wt 64.4 kg (142 lb)   LMP  (LMP Unknown)   SpO2 99%   BMI 25.76 kg/m   Estimated body mass index is 25.76 kg/m  as calculated from the following:    Height as of this encounter: 1.581 m (5' 2.25\").    Weight as of this encounter: 64.4 kg (142 lb). Body surface area is 1.68 meters squared.  No Pain (0) Comment: Data Unavailable   No LMP recorded (lmp unknown). Patient is postmenopausal.  Allergies reviewed: Yes  Medications reviewed: Yes    Medications: Medication refills not needed today.  Pharmacy name entered into [facility]:    [facility] [address] [place] [address] [address] [facility]    Clinical concerns: f/u       Lois Higgins, ZIGGY              "

## 2022-11-29 NOTE — PROGRESS NOTES
Visit Date: 11/29/2022    Kayleigh Sousa is a 67-year-old patient who comes in today for interim followup.    CHIEF COMPLAINT:  1.  History of right-sided DCIS diagnosed in 2018, status post lumpectomy and radiation therapy.  2.  Left-sided breast cancer diagnosed in 2019.  Stage of disease was a T2 N1 M0, ER/NH positive, HER-2 receptor negative tumor.  3.  Now status post bilateral mastectomies.  4.  Oncotype in the low risk of recurrence range.    HISTORY OF PRESENT ILLNESS:  Kayleigh is a 67-year-old postmenopausal patient with a history of bilateral breast cancer as outlined above.  The most recent breast cancer was on the left side, it was a T2 N1 tumor, ER/NH positive, HER-2 receptor negative, and she had an Oncotype, which came back with a recurrence score of 9.  She did post-mastectomy radiation therapy and then started on hormonal treatment, but got quite a bit of side effects from aromatase inhibitors, so now she elected to do close observation.  She has no major complaints coming in today.  She denies cough, shortness of breath, bone pain, or any worrisome symptomatology.  She does also suffer from some renal dysfunction and follows with nephrology for that.    FAMILY HISTORY:  First cousin and paternal cousin with breast cancer.    COMPREHENSIVE REVIEW OF SYSTEMS:  A 12-point comprehensive review of systems is otherwise unremarkable.    PAST MEDICAL HISTORY:  History of hyperaldosteronism, history of renal insufficiency, history of hypertension, history of bilateral breast cancer as outlined above.    FAMILY HISTORY:  As of my note of 05/25/2022.    MEDICATIONS AND ALLERGIES:  These are outlined in the nursing records.    SOCIAL HISTORY:  The patient is .  She has 2 children in their 40s.    PAIN ASSESSMENT:  The patient denies pain today.    PHYSICAL EXAMINATION:    GENERAL:  She is a well-appearing lady, in no acute distress.  VITAL SIGNS:  Stable.  NECK:  Has no masses or goiter.  CHEST:  Clear to  auscultation and percussion bilaterally.  HEART:  Sounds 1 and 2 normal, no added sounds, no murmurs.   BREASTS:  The patient is status post bilateral mastectomies.  The scars look good.  Right and left axillae are negative.  GASTROINTESTINAL:  Abdomen is soft and nontender.  No hepatosplenomegaly.  EXTREMITIES:  Legs without tenderness or edema.  NEUROLOGIC:  Peripheral neurological exam grossly intact.  SKIN:  Has no rashes or lesions.    DATA REVIEW:  Creatinine 1.6, GFR 34.  Liver function tests within normal limits.  Alkaline phosphatase slightly elevated at 113.  White cell count 3.3, hemoglobin 13.6, platelets 227.  Potassium slightly elevated at 5.7.    IMPRESSION:  This is a 67-year-old patient with a previous history of right-sided DCIS, and more recently left-sided breast cancer.  She elected not to do any hormonal therapy because of the side effect profile.  Her creatinine is slightly more elevated today with a slightly more elevated potassium.  This specimen was hemolyzed, so she probably will need that repeated.  We will also repeat her alkaline phosphatase in the next couple of weeks.  I will be in touch with her with the results.    Victor M Vargas MD        D: 2022   T: 2022   MT: BRIE    Name:     OTF BURGOS  MRN:      [id number]        Account:    [id number]   :      1955           Visit Date: 2022     Document: [id number]

## 2022-11-29 NOTE — LETTER
11/29/2022         RE: Kayleigh Sousa  [address]  [place] [address]        Dear Colleague,    Thank you for referring your patient, Kayleigh Sousa, to the [facility]. Please see a copy of my visit note below.    Visit Date: 11/29/2022    Kayleigh Sousa is a 67-year-old patient who comes in today for interim followup.    CHIEF COMPLAINT:  1.  History of right-sided DCIS diagnosed in 2018, status post lumpectomy and radiation therapy.  2.  Left-sided breast cancer diagnosed in 2019.  Stage of disease was a T2 N1 M0, ER/IA positive, HER-2 receptor negative tumor.  3.  Now status post bilateral mastectomies.  4.  Oncotype in the low risk of recurrence range.    HISTORY OF PRESENT ILLNESS:  Kayleigh is a 67-year-old postmenopausal patient with a history of bilateral breast cancer as outlined above.  The most recent breast cancer was on the left side, it was a T2 N1 tumor, ER/IA positive, HER-2 receptor negative, and she had an Oncotype, which came back with a recurrence score of 9.  She did post-mastectomy radiation therapy and then started on hormonal treatment, but got quite a bit of side effects from aromatase inhibitors, so now she elected to do close observation.  She has no major complaints coming in today.  She denies cough, shortness of breath, bone pain, or any worrisome symptomatology.  She does also suffer from some renal dysfunction and follows with nephrology for that.    FAMILY HISTORY:  First cousin and paternal cousin with breast cancer.    COMPREHENSIVE REVIEW OF SYSTEMS:  A 12-point comprehensive review of systems is otherwise unremarkable.    PAST MEDICAL HISTORY:  History of hyperaldosteronism, history of renal insufficiency, history of hypertension, history of bilateral breast cancer as outlined above.    FAMILY HISTORY:  As of my note of 05/25/2022.    MEDICATIONS AND ALLERGIES:  These are outlined in the nursing records.    SOCIAL HISTORY:  The patient is  .  She has 2 children in their 40s.    PAIN ASSESSMENT:  The patient denies pain today.    PHYSICAL EXAMINATION:    GENERAL:  She is a well-appearing lady, in no acute distress.  VITAL SIGNS:  Stable.  NECK:  Has no masses or goiter.  CHEST:  Clear to auscultation and percussion bilaterally.  HEART:  Sounds 1 and 2 normal, no added sounds, no murmurs.   BREASTS:  The patient is status post bilateral mastectomies.  The scars look good.  Right and left axillae are negative.  GASTROINTESTINAL:  Abdomen is soft and nontender.  No hepatosplenomegaly.  EXTREMITIES:  Legs without tenderness or edema.  NEUROLOGIC:  Peripheral neurological exam grossly intact.  SKIN:  Has no rashes or lesions.    DATA REVIEW:  Creatinine 1.6, GFR 34.  Liver function tests within normal limits.  Alkaline phosphatase slightly elevated at 113.  White cell count 3.3, hemoglobin 13.6, platelets 227.  Potassium slightly elevated at 5.7.    IMPRESSION:  This is a 67-year-old patient with a previous history of right-sided DCIS, and more recently left-sided breast cancer.  She elected not to do any hormonal therapy because of the side effect profile.  Her creatinine is slightly more elevated today with a slightly more elevated potassium.  This specimen was hemolyzed, so she probably will need that repeated.  We will also repeat her alkaline phosphatase in the next couple of weeks.  I will be in touch with her with the results.    Victor M Vargas MD        D: 2022   T: 2022   MT: BRIE    Name:     OTF BURGOS  MRN:      [id number]        Account:    [id number]   :      1955           Visit Date: 2022     Document: [id number]      Again, thank you for allowing me to participate in the care of your patient.        Sincerely,        Victor M Vargas MD

## 2022-12-26 ENCOUNTER — PATIENT OUTREACH (OUTPATIENT)
Dept: ONCOLOGY | Facility: CLINIC | Age: 67
End: 2022-12-26

## 2022-12-26 DIAGNOSIS — N28.9 KIDNEY DYSFUNCTION: ICD-10-CM

## 2022-12-26 DIAGNOSIS — Z17.0 MALIGNANT NEOPLASM OF UPPER-OUTER QUADRANT OF LEFT BREAST IN FEMALE, ESTROGEN RECEPTOR POSITIVE (H): Primary | ICD-10-CM

## 2022-12-26 DIAGNOSIS — C50.412 MALIGNANT NEOPLASM OF UPPER-OUTER QUADRANT OF LEFT BREAST IN FEMALE, ESTROGEN RECEPTOR POSITIVE (H): Primary | ICD-10-CM

## 2022-12-26 NOTE — PROGRESS NOTES
Erikr left voicemail for Kayleigh to return call to the clinic to schedule follow-up lab appointment to monitor kidney and liver function following labs completed 11/29/22. Will await return call for scheduling.    Ana Rodrigez, RN, BSN, OCN  Nurse Care Coordinator  [facility]  P: [phone]     F: [phone]

## 2022-12-27 DIAGNOSIS — I10 ESSENTIAL HYPERTENSION: Primary | ICD-10-CM

## 2022-12-28 NOTE — PROGRESS NOTES
Writer connected with Kayleigh on second attempt. She will drop by the hospital lab as a walk-in later this week. Writer will monitor for results.    Ana Rodrigez, RN, BSN, OCN  Nurse Care Coordinator  [facility] [place]  P: [phone]     F: [phone]

## 2022-12-29 RX ORDER — HYDROCHLOROTHIAZIDE 12.5 MG/1
CAPSULE ORAL
Qty: 60 CAPSULE | Refills: 0 | Status: SHIPPED | OUTPATIENT
Start: 2022-12-29 | End: 2023-02-06

## 2022-12-29 NOTE — TELEPHONE ENCOUNTER
Routing refill request to provider for review/approval because:  Normal serum creatinine on file in past 12 months    Normal serum potassium on file in past 12 months     Ervin COOK RN, BSN

## 2023-01-11 ENCOUNTER — OFFICE VISIT (OUTPATIENT)
Dept: ENDOCRINOLOGY | Facility: CLINIC | Age: 68
End: 2023-01-11
Attending: INTERNAL MEDICINE
Payer: COMMERCIAL

## 2023-01-11 VITALS
SYSTOLIC BLOOD PRESSURE: 117 MMHG | RESPIRATION RATE: 16 BRPM | DIASTOLIC BLOOD PRESSURE: 68 MMHG | TEMPERATURE: 99.3 F | BODY MASS INDEX: 25.69 KG/M2 | WEIGHT: 139.6 LBS | HEART RATE: 70 BPM | HEIGHT: 62 IN | OXYGEN SATURATION: 99 %

## 2023-01-11 DIAGNOSIS — E26.9 HYPERALDOSTERONISM (H): Primary | ICD-10-CM

## 2023-01-11 DIAGNOSIS — N28.9 KIDNEY DYSFUNCTION: ICD-10-CM

## 2023-01-11 DIAGNOSIS — C50.412 MALIGNANT NEOPLASM OF UPPER-OUTER QUADRANT OF LEFT BREAST IN FEMALE, ESTROGEN RECEPTOR POSITIVE (H): ICD-10-CM

## 2023-01-11 DIAGNOSIS — I10 ESSENTIAL HYPERTENSION: ICD-10-CM

## 2023-01-11 DIAGNOSIS — Z17.0 MALIGNANT NEOPLASM OF UPPER-OUTER QUADRANT OF LEFT BREAST IN FEMALE, ESTROGEN RECEPTOR POSITIVE (H): ICD-10-CM

## 2023-01-11 DIAGNOSIS — E26.9 HYPERALDOSTERONISM (H): ICD-10-CM

## 2023-01-11 LAB
ALBUMIN SERPL BCG-MCNC: 4.7 G/DL (ref 3.5–5.2)
ALP SERPL-CCNC: 99 U/L (ref 35–104)
ALT SERPL W P-5'-P-CCNC: 16 U/L (ref 10–35)
ANION GAP SERPL CALCULATED.3IONS-SCNC: 13 MMOL/L (ref 7–15)
AST SERPL W P-5'-P-CCNC: 22 U/L (ref 10–35)
BILIRUB SERPL-MCNC: 0.5 MG/DL
BUN SERPL-MCNC: 40.4 MG/DL (ref 8–23)
CALCIUM SERPL-MCNC: 10 MG/DL (ref 8.8–10.2)
CHLORIDE SERPL-SCNC: 103 MMOL/L (ref 98–107)
CREAT SERPL-MCNC: 1.8 MG/DL (ref 0.51–0.95)
DEPRECATED HCO3 PLAS-SCNC: 23 MMOL/L (ref 22–29)
GFR SERPL CREATININE-BSD FRML MDRD: 30 ML/MIN/1.73M2
GLUCOSE SERPL-MCNC: 84 MG/DL (ref 70–99)
POTASSIUM SERPL-SCNC: 5 MMOL/L (ref 3.4–5.3)
PROT SERPL-MCNC: 7.8 G/DL (ref 6.4–8.3)
SODIUM SERPL-SCNC: 139 MMOL/L (ref 136–145)

## 2023-01-11 PROCEDURE — 36415 COLL VENOUS BLD VENIPUNCTURE: CPT | Performed by: INTERNAL MEDICINE

## 2023-01-11 PROCEDURE — 99215 OFFICE O/P EST HI 40 MIN: CPT | Performed by: INTERNAL MEDICINE

## 2023-01-11 PROCEDURE — 80053 COMPREHEN METABOLIC PANEL: CPT | Performed by: INTERNAL MEDICINE

## 2023-01-11 RX ORDER — SPIRONOLACTONE 100 MG/1
100 TABLET, FILM COATED ORAL DAILY
Qty: 90 TABLET | Refills: 2 | Status: SHIPPED | OUTPATIENT
Start: 2023-01-11 | End: 2023-01-13

## 2023-01-11 NOTE — PROGRESS NOTES
Name: Kayleigh Sousa  Seen at the request of  for hyperaldosteronism.  New pt to me.  JACKI from .  Chief Complaint   Patient presents with     New Patient     Hyperaldosteronism      HPI:  Kayleigh Sousa is a 67 year old female who presents for the evaluation of above.   has a past medical history of Benign essential hypertension, Leukopenia, Malignant neoplasm of upper-outer quadrant of left breast in female, estrogen receptor positive (H), and PAC (premature atrial contraction).    Initially diagnosed with HTN at age 35.  Following that she was started on blood pressure medication.  Noted to have hypokalemia.  Initial work up with endocrinology showed elevated plasma metanephrine.  Urine metanephrine normal.    Noted to have high aldosterone, The renin level was not suppressed.  Urine cortisol was normal.   Her TSH is normal.   Salt loading test was recommended but she was not able to do that as she reports that when she eats higher amount of salt her blood pressure goes higher and she does not feel well.  She was on triamterene in the past which was stopped and switched to spironolactone.  Currently she is taking amlodipine 10 mg, hydrochlorothiazide 25 mg, lisinopril 40 mg and spironolactone 100 mg/day  She was also on potassium chloride 20 M EQ supplement but she was  taking it on an off (change the dosing by herself ) after 11/2022 labs showed high potassium levels (it was hemolyzed sample).    She reports that she is off potassium supplement for last 1 week.  She occasionally checks blood pressure at home.  No ambulatory blood sugar data to review.  Blood pressure in clinic today is in acceptable range.  Patient feels well at this time and denies any tachycardia, palpitations, heat intolerance, tremor, insomnia, diarrhea, or unexplained weight loss.  Patient also denies  cold intolerance, constipation, or unexplained weight gain.   PMH/PSH:  Past Medical History:   Diagnosis Date      Benign essential hypertension      Leukopenia     mild-3100 wbc     Malignant neoplasm of upper-outer quadrant of left breast in female, estrogen receptor positive (H)      PAC (premature atrial contraction)      Past Surgical History:   Procedure Laterality Date     APPENDECTOMY  2011     APPENDECTOMY  2011     CYST REMOVAL Right     axillary cyst drainage     LUMPECTOMY BREAST Right 2008    DCIS tumorwas ER/LA positive, radiation follewed lumpectomy     MASTECTOMY Bilateral 2019     MASTECTOMY       NM SENTINEL NODE INJECTION  9/25/2019     LA MASTECTOMY, SIMPLE, COMPLETE Bilateral 9/25/2019    Procedure: Bilateral Mastectomies with Left [place] Lymph Node Biopsy, Left axillary node dissection 23HR STAY;  Surgeon: Beba Hernandez MD;  Location: [facility];  Service: General     TOE SURGERY Right      Family Hx:  Family History   Problem Relation Age of Onset     Cerebrovascular Disease Mother      Hypertension Mother      Diabetes Mother      Diabetes Father      Hypertension Father      Hypertension Sister      Hypertension Brother      Hypertension Sister      Hypertension Sister      Diabetes Sister      Coronary Artery Disease Father      Breast Cancer Cousin      Brain Cancer Cousin      Social Hx:  Social History     Socioeconomic History     Marital status:      Spouse name: Not on file     Number of children: Not on file     Years of education: Not on file     Highest education level: Not on file   Occupational History     Not on file   Tobacco Use     Smoking status: Never     Smokeless tobacco: Never   Vaping Use     Vaping Use: Never used   Substance and Sexual Activity     Alcohol use: Not Currently     Drug use: Never     Sexual activity: Not on file   Other Topics Concern     Not on file   Social History Narrative    Retired, 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 Lives in [email or web address] study, reading.         Claudette Castillo, DNP, APRN, CNP  "    Social Determinants of Health     Financial Resource Strain: Not on file   Food Insecurity: Not on file   Transportation Needs: Not on file   Physical Activity: Not on file   Stress: Not on file   Social Connections: Not on file   Intimate Partner Violence: Not At Risk     Fear of Current or Ex-Partner: No     Emotionally Abused: No     Physically Abused: No     Sexually Abused: No   Housing Stability: Not on file          MEDICATIONS:  has a current medication list which includes the following prescription(s): amlodipine, vitamin c, ferrous sulfate, hydrochlorothiazide, lisinopril, potassium chloride er, spironolactone, and zinc.    ROS     ROS: 10 point ROS neg other than the symptoms noted above in the HPI.    Physical Exam   VS: /68 (BP Location: Left arm, Patient Position: Chair, Cuff Size: Adult Regular)   Pulse 70   Temp 99.3  F (37.4  C) (Tympanic)   Resp 16   Ht 1.581 m (5' 2.24\")   Wt 63.3 kg (139 lb 9.6 oz)   LMP  (LMP Unknown)   SpO2 99%   Breastfeeding No   BMI 25.33 kg/m    GENERAL: healthy, alert and no distress  EYES: Eyes grossly normal to inspection, conjunctivae and sclerae normal  ENT: no nose swelling, nasal discharge.  Thyroid: no apparent thyroid nodules.  Thyroid appears normal in size and nontender.  CV: RRR, no rubs, gallops, no murmurs  RESP: CTAB, no wheezes, rales, or ronchi  ABDO: +BS  EXTREMITIES: no hand tremors.  NEURO: Cranial nerves grossly intact, mentation intact and speech normal  SKIN: No apparent skin lesions, rash or edema seen   PSYCH: mentation appears normal, affect normal/bright, judgement and insight intact, normal speech and appearance well-groomed      LABS:    Potassium:  ENDO ADRENAL LABS Latest Ref Rng & Units 11/29/2022 6/27/2022   POTASSIUM 3.4 - 5.3 mmol/L 5.7 (H) 4.5     Renin/Aldosterone:  ENDO ADRENAL LABS Latest Ref Rng & Units 3/31/2021   RENIN ng/mL/hr 1.1     ENDO ADRENAL LABS Latest Ref Rng & Units 3/31/2021   ALDOSTERONE 0.0 - 31.0 " ng/dL 33.7 (H)     Cortisol:  ENDO ADRENAL LABS Latest Ref Rng & Units 4/24/2021   CORTISOL UG/G CRT ug/g CRT 7.36     Urine cortisol:  ENDO ADRENAL LABS Latest Ref Rng & Units 4/24/2021   Cortisol Free Creat R UR mg/dL 36   Cortisol Free Creat T  - 1,400 mg/d 1,116   CORTISOL FREE DURATION URINE h 24   CORTISOL FREE, URINE RANDOM ug/L 2.65   CORTISOL FREE, URINE <=45.0 ug/d 8.2   CORTISOL UG/G CRT ug/g CRT 7.36     Urine metanephrines:  Component      Latest Ref Rng & Units 4/24/2021   Hours Collected       25.0   Total Urine Volume       3,200   Metanephr 24 H ur/cr      0 - 300 ug/g CRT 47   Metanephrine 24 Hr/Random Urine      36 - 229 ug/d 53   Metanephrine Urine per Volume      ug/L 17   Normetaneph 24H ur/cr      0 - 400 ug/g    Normetanephrine 24 Hr/Random Urine      95 - 650 ug/d 344   Normetanephrine Urine per Volume      ug/L 111   Metanephrine 24 Hr/Random Urine Interpretation       SEE NOTE   Creatinine Urine/Volume      mg/dL 36   Creatinine Urine/24hr      500 - 1,400 mg/d 1,116       All pertinent notes, labs, and images personally reviewed by me.     A/P  Ms.Greta HELENA Sousa is a 67 year old here for the evaluation of:    1. Hyperaldosteronism (H)    Currently she is taking amlodipine 10 mg, hydrochlorothiazide 25 mg, lisinopril 40 mg and spironolactone 100 mg/day.  Blood pressure under good control on this regimen.  She has been taking potassium supplement on and off since 11/2022 labs showing high potassium levels--but it was a hemolyzed sample.  Last potassium supplement use was 1 week back.  Plan:  Discussed diagnosis, pathophysiology, management and treatment options of condition with pt.  Discussed hyperaldosteronism in general.  Discussed risk of hypokalemia associated with hyperaldosteronism and hyperkalemia with spironolactone use.  She cannot do salt loading test as noted above.  Previous blood work showing high aldosterone levels and renin was not suppressed.  In the setting  of blood pressure in normal range on current medication recommend to continue current blood pressure regimen  Will recheck potassium levels today.  Based on that consider to restart potassium supplement.  We will adjust dose based on potassium levels.      All questions were answered.  The patient indicates understanding of the above issues and agrees with the plan set forth.     Total time spent in with the patient evaluation:  20 min    Additional time spent reviewing pertinent lab tests and chart notes, and documentation: 20 min      Follow-up:  Based on labs.    Shannan Cline MD  Endocrinology   [facility]/[place]    CC: Carrillo Wills     Disclaimer: This note consists of symbols derived from keyboarding, dictation and/or voice recognition software. As a result, there may be errors in the script that have gone undetected. Please consider this when interpreting information found in this chart.    Answers for HPI/ROS submitted by the patient on 1/10/2023  General Symptoms: No  Skin Symptoms: No  HENT Symptoms: No  EYE SYMPTOMS: No  HEART SYMPTOMS: No  LUNG SYMPTOMS: No  INTESTINAL SYMPTOMS: No  URINARY SYMPTOMS: No  GYNECOLOGIC SYMPTOMS: No  BREAST SYMPTOMS: No  SKELETAL SYMPTOMS: No  BLOOD SYMPTOMS: No  NERVOUS SYSTEM SYMPTOMS: No  MENTAL HEALTH SYMPTOMS: No

## 2023-01-11 NOTE — LETTER
1/11/2023         RE: Kayleigh Sousa  [address] [place] [address]        Dear Colleague,    Thank you for referring your patient, Kayleigh Sousa, to the [facility]. Please see a copy of my visit note below.    Name: Kayleigh Sousa  Seen at the request of  for hyperaldosteronism.  New pt to me.  JACKI from .  Chief Complaint   Patient presents with     New Patient     Hyperaldosteronism      HPI:  Kayleigh Sousa is a 67 year old female who presents for the evaluation of above.   has a past medical history of Benign essential hypertension, Leukopenia, Malignant neoplasm of upper-outer quadrant of left breast in female, estrogen receptor positive (H), and PAC (premature atrial contraction).    Initially diagnosed with HTN at age 35.  Following that she was started on blood pressure medication.  Noted to have hypokalemia.  Initial work up with endocrinology showed elevated plasma metanephrine.  Urine metanephrine normal.    Noted to have high aldosterone, The renin level was not suppressed.  Urine cortisol was normal.   Her TSH is normal.   Salt loading test was recommended but she was not able to do that as she reports that when she eats higher amount of salt her blood pressure goes higher and she does not feel well.  She was on triamterene in the past which was stopped and switched to spironolactone.  Currently she is taking amlodipine 10 mg, hydrochlorothiazide 25 mg, lisinopril 40 mg and spironolactone 100 mg/day  She was also on potassium chloride 20 M EQ supplement but she was  taking it on an off (change the dosing by herself ) after 11/2022 labs showed high potassium levels (it was hemolyzed sample).    She reports that she is off potassium supplement for last 1 week.  She occasionally checks blood pressure at home.  No ambulatory blood sugar data to review.  Blood pressure in clinic today is in acceptable range.  Patient feels well at this time and  denies any tachycardia, palpitations, heat intolerance, tremor, insomnia, diarrhea, or unexplained weight loss.  Patient also denies  cold intolerance, constipation, or unexplained weight gain.   PMH/PSH:  Past Medical History:   Diagnosis Date     Benign essential hypertension      Leukopenia     mild-3100 wbc     Malignant neoplasm of upper-outer quadrant of left breast in female, estrogen receptor positive (H)      PAC (premature atrial contraction)      Past Surgical History:   Procedure Laterality Date     APPENDECTOMY  2011     APPENDECTOMY  2011     CYST REMOVAL Right     axillary cyst drainage     LUMPECTOMY BREAST Right 2008    DCIS tumorwas ER/TX positive, radiation follewed lumpectomy     MASTECTOMY Bilateral 2019     MASTECTOMY       NM SENTINEL NODE INJECTION  9/25/2019     TX MASTECTOMY, SIMPLE, COMPLETE Bilateral 9/25/2019    Procedure: Bilateral Mastectomies with Left [place] Lymph Node Biopsy, Left axillary node dissection 23HR STAY;  Surgeon: Beba Hernandez MD;  Location: [facility];  Service: General     TOE SURGERY Right      Family Hx:  Family History   Problem Relation Age of Onset     Cerebrovascular Disease Mother      Hypertension Mother      Diabetes Mother      Diabetes Father      Hypertension Father      Hypertension Sister      Hypertension Brother      Hypertension Sister      Hypertension Sister      Diabetes Sister      Coronary Artery Disease Father      Breast Cancer Cousin      Brain Cancer Cousin      Social Hx:  Social History     Socioeconomic History     Marital status:      Spouse name: Not on file     Number of children: Not on file     Years of education: Not on file     Highest education level: Not on file   Occupational History     Not on file   Tobacco Use     Smoking status: Never     Smokeless tobacco: Never   Vaping Use     Vaping Use: Never used   Substance and Sexual Activity     Alcohol use: Not Currently     Drug use: Never     Sexual activity:  "Not on file   Other Topics Concern     Not on file   Social History Narrative    Retired, 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 Lives in [place]. [employer] study, reading.         Claudette Castillo, DNP, APRN, CNP     Social Determinants of Health     Financial Resource Strain: Not on file   Food Insecurity: Not on file   Transportation Needs: Not on file   Physical Activity: Not on file   Stress: Not on file   Social Connections: Not on file   Intimate Partner Violence: Not At Risk     Fear of Current or Ex-Partner: No     Emotionally Abused: No     Physically Abused: No     Sexually Abused: No   Housing Stability: Not on file          MEDICATIONS:  has a current medication list which includes the following prescription(s): amlodipine, vitamin c, ferrous sulfate, hydrochlorothiazide, lisinopril, potassium chloride er, spironolactone, and zinc.    ROS     ROS: 10 point ROS neg other than the symptoms noted above in the HPI.    Physical Exam   VS: /68 (BP Location: Left arm, Patient Position: Chair, Cuff Size: Adult Regular)   Pulse 70   Temp 99.3  F (37.4  C) (Tympanic)   Resp 16   Ht 1.581 m (5' 2.24\")   Wt 63.3 kg (139 lb 9.6 oz)   LMP  (LMP Unknown)   SpO2 99%   Breastfeeding No   BMI 25.33 kg/m    GENERAL: healthy, alert and no distress  EYES: Eyes grossly normal to inspection, conjunctivae and sclerae normal  ENT: no nose swelling, nasal discharge.  Thyroid: no apparent thyroid nodules.  Thyroid appears normal in size and nontender.  CV: RRR, no rubs, gallops, no murmurs  RESP: CTAB, no wheezes, rales, or ronchi  ABDO: +BS  EXTREMITIES: no hand tremors.  NEURO: Cranial nerves grossly intact, mentation intact and speech normal  SKIN: No apparent skin lesions, rash or edema seen   PSYCH: mentation appears normal, affect normal/bright, judgement and insight intact, normal speech and appearance well-groomed      LABS:    Potassium:  ENDO ADRENAL LABS Latest Ref Rng & " Units 11/29/2022 6/27/2022   POTASSIUM 3.4 - 5.3 mmol/L 5.7 (H) 4.5     Renin/Aldosterone:  ENDO ADRENAL LABS Latest Ref Rng & Units 3/31/2021   RENIN ng/mL/hr 1.1     ENDO ADRENAL LABS Latest Ref Rng & Units 3/31/2021   ALDOSTERONE 0.0 - 31.0 ng/dL 33.7 (H)     Cortisol:  ENDO ADRENAL LABS Latest Ref Rng & Units 4/24/2021   CORTISOL UG/G CRT ug/g CRT 7.36     Urine cortisol:  ENDO ADRENAL LABS Latest Ref Rng & Units 4/24/2021   Cortisol Free Creat R UR mg/dL 36   Cortisol Free Creat T  - 1,400 mg/d 1,116   CORTISOL FREE DURATION URINE h 24   CORTISOL FREE, URINE RANDOM ug/L 2.65   CORTISOL FREE, URINE <=45.0 ug/d 8.2   CORTISOL UG/G CRT ug/g CRT 7.36     Urine metanephrines:  Component      Latest Ref Rng & Units 4/24/2021   Hours Collected       25.0   Total Urine Volume       3,200   Metanephr 24 H ur/cr      0 - 300 ug/g CRT 47   Metanephrine 24 Hr/Random Urine      36 - 229 ug/d 53   Metanephrine Urine per Volume      ug/L 17   Normetaneph 24H ur/cr      0 - 400 ug/g    Normetanephrine 24 Hr/Random Urine      95 - 650 ug/d 344   Normetanephrine Urine per Volume      ug/L 111   Metanephrine 24 Hr/Random Urine Interpretation       SEE NOTE   Creatinine Urine/Volume      mg/dL 36   Creatinine Urine/24hr      500 - 1,400 mg/d 1,116       All pertinent notes, labs, and images personally reviewed by me.     A/P  Ms.Greta HELENA Sousa is a 67 year old here for the evaluation of:    1. Hyperaldosteronism (H)    Currently she is taking amlodipine 10 mg, hydrochlorothiazide 25 mg, lisinopril 40 mg and spironolactone 100 mg/day.  Blood pressure under good control on this regimen.  She has been taking potassium supplement on and off since 11/2022 labs showing high potassium levels--but it was a hemolyzed sample.  Last potassium supplement use was 1 week back.  Plan:  Discussed diagnosis, pathophysiology, management and treatment options of condition with pt.  Discussed hyperaldosteronism in general.  Discussed  risk of hypokalemia associated with hyperaldosteronism and hyperkalemia with spironolactone use.  She cannot do salt loading test as noted above.  Previous blood work showing high aldosterone levels and renin was not suppressed.  In the setting of blood pressure in normal range on current medication recommend to continue current blood pressure regimen  Will recheck potassium levels today.  Based on that consider to restart potassium supplement.  We will adjust dose based on potassium levels.      All questions were answered.  The patient indicates understanding of the above issues and agrees with the plan set forth.     Total time spent in with the patient evaluation:  20 min    Additional time spent reviewing pertinent lab tests and chart notes, and documentation: 20 min      Follow-up:  Based on labs.    Shannan Cline MD  Endocrinology   [facility]/[place]    CC: Carrillo Wills     Disclaimer: This note consists of symbols derived from keyboarding, dictation and/or voice recognition software. As a result, there may be errors in the script that have gone undetected. Please consider this when interpreting information found in this chart.    Answers for HPI/ROS submitted by the patient on 1/10/2023  General Symptoms: No  Skin Symptoms: No  HENT Symptoms: No  EYE SYMPTOMS: No  HEART SYMPTOMS: No  LUNG SYMPTOMS: No  INTESTINAL SYMPTOMS: No  URINARY SYMPTOMS: No  GYNECOLOGIC SYMPTOMS: No  BREAST SYMPTOMS: No  SKELETAL SYMPTOMS: No  BLOOD SYMPTOMS: No  NERVOUS SYSTEM SYMPTOMS: No  MENTAL HEALTH SYMPTOMS: No          Again, thank you for allowing me to participate in the care of your patient.        Sincerely,        Shannan Cline MD

## 2023-01-11 NOTE — PATIENT INSTRUCTIONS
[facility]  Dr Cline, Endocrinology Department    [place] [address] [id number]  [place] [address]  Appointment [phone]  Fax: [phone]  [place]: Monday - Thursday      Please check the cost coverage and copay with insurance before recommended tests, services and medications (especially if new medications are prescribed).     If ordered, please get blood work done 1 week prior to your next appointment so they will be available to Dr. Cline at your visit.    Labs today.  Continue current BP regimen.  Based on labs decide Potassium supplement dosing.

## 2023-01-13 RX ORDER — SPIRONOLACTONE 100 MG/1
TABLET, FILM COATED ORAL
Qty: 90 TABLET | Refills: 1 | Status: SHIPPED | OUTPATIENT
Start: 2023-01-13 | End: 2023-06-30

## 2023-01-18 ENCOUNTER — MYC MEDICAL ADVICE (OUTPATIENT)
Dept: INTERNAL MEDICINE | Facility: CLINIC | Age: 68
End: 2023-01-18
Payer: COMMERCIAL

## 2023-01-18 ENCOUNTER — MYC MEDICAL ADVICE (OUTPATIENT)
Dept: ENDOCRINOLOGY | Facility: CLINIC | Age: 68
End: 2023-01-18
Payer: COMMERCIAL

## 2023-01-18 DIAGNOSIS — I10 ESSENTIAL HYPERTENSION: ICD-10-CM

## 2023-01-18 DIAGNOSIS — N28.9 KIDNEY DYSFUNCTION: Primary | ICD-10-CM

## 2023-01-19 RX ORDER — AMLODIPINE BESYLATE 10 MG/1
10 TABLET ORAL DAILY
Qty: 90 TABLET | Refills: 3 | Status: SHIPPED | OUTPATIENT
Start: 2023-01-19 | End: 2023-06-30

## 2023-01-19 NOTE — TELEPHONE ENCOUNTER
Lab ordered.    Pat Kelsey [facility] Endocrinology [place]  [phone]

## 2023-01-20 NOTE — TELEPHONE ENCOUNTER
See previous mychart encounter.  Potassium in range (off supplement)  So plan to continue spironolactone and continue to hold Potassium supplements.

## 2023-02-03 ENCOUNTER — MYC MEDICAL ADVICE (OUTPATIENT)
Dept: INTERNAL MEDICINE | Facility: CLINIC | Age: 68
End: 2023-02-03
Payer: COMMERCIAL

## 2023-02-03 DIAGNOSIS — I10 ESSENTIAL HYPERTENSION: ICD-10-CM

## 2023-02-06 RX ORDER — HYDROCHLOROTHIAZIDE 12.5 MG/1
CAPSULE ORAL
Qty: 60 CAPSULE | Refills: 0 | Status: SHIPPED | OUTPATIENT
Start: 2023-02-06 | End: 2023-03-15

## 2023-02-06 NOTE — TELEPHONE ENCOUNTER
Patient sent Amara message stating:    Hydrochlorothiazide, this is a maintenance drug, is there any reason why I can't get a prescription written for a year, in 90-day increments? I'm due for a yearly visit in July, in the meantime I have seen two other Dr's so I'm not neglecting my checkups. In fact, June 27, 2022, when I talked to the medical assistant about prescription refills since they were current, she thought that in Oct. the prescriptions would be renewed, this has not been the case. How can I resolve this issue as this has only been occurring since I've changed my care to [facility]. I need a prescription refill for HCTZ using the [facility]'s pharmacy in [place]. Thank you.    Last prescription was sent to provider to review due to abnormal creatinine. Please advise if 90 day prescription with refills can be sent.     Routing refill request to provider for review/approval because:  Labs out of range:  Cr

## 2023-02-09 ENCOUNTER — TRANSFERRED RECORDS (OUTPATIENT)
Dept: HEALTH INFORMATION MANAGEMENT | Facility: CLINIC | Age: 68
End: 2023-02-09
Payer: COMMERCIAL

## 2023-03-11 DIAGNOSIS — I10 ESSENTIAL HYPERTENSION: ICD-10-CM

## 2023-03-15 RX ORDER — HYDROCHLOROTHIAZIDE 12.5 MG/1
CAPSULE ORAL
Qty: 60 CAPSULE | Refills: 0 | Status: SHIPPED | OUTPATIENT
Start: 2023-03-15 | End: 2023-04-13

## 2023-03-15 NOTE — TELEPHONE ENCOUNTER
Hydrochlorothiazide 12.5 mg  Routing refill request to provider for review/approval because:  Labs out of range:    Creatinine   Date Value Ref Range Status   01/11/2023 1.80 (H) 0.51 - 0.95 mg/dL Final   07/01/2021 1.39 (H) 0.52 - 1.04 mg/dL Final     PLEASE CHANGE TO 90 DAY DOSE per patient request    LOV 06/2022

## 2023-03-28 DIAGNOSIS — I10 BENIGN ESSENTIAL HYPERTENSION: ICD-10-CM

## 2023-03-29 RX ORDER — LISINOPRIL 40 MG/1
TABLET ORAL
Qty: 90 TABLET | Refills: 0 | Status: SHIPPED | OUTPATIENT
Start: 2023-03-29 | End: 2023-06-20

## 2023-03-29 NOTE — TELEPHONE ENCOUNTER
Routing refill request to provider for review/approval because:  Labs out of range:    Creatinine   Date Value Ref Range Status   01/11/2023 1.80 (H) 0.51 - 0.95 mg/dL Final   07/01/2021 1.39 (H) 0.52 - 1.04 mg/dL Final

## 2023-03-30 ENCOUNTER — TELEPHONE (OUTPATIENT)
Dept: OPTOMETRY | Facility: CLINIC | Age: 68
End: 2023-03-30
Payer: COMMERCIAL

## 2023-03-30 ENCOUNTER — OFFICE VISIT (OUTPATIENT)
Dept: OPHTHALMOLOGY | Facility: CLINIC | Age: 68
End: 2023-03-30
Attending: INTERNAL MEDICINE
Payer: COMMERCIAL

## 2023-03-30 DIAGNOSIS — H43.813 PVD (POSTERIOR VITREOUS DETACHMENT), BOTH EYES: ICD-10-CM

## 2023-03-30 DIAGNOSIS — H43.819 PVD (POSTERIOR VITREOUS DETACHMENT), UNSPECIFIED LATERALITY: Primary | ICD-10-CM

## 2023-03-30 PROCEDURE — 99214 OFFICE O/P EST MOD 30 MIN: CPT | Mod: GC | Performed by: OPHTHALMOLOGY

## 2023-03-30 PROCEDURE — 92134 CPTRZ OPH DX IMG PST SGM RTA: CPT | Performed by: OPHTHALMOLOGY

## 2023-03-30 PROCEDURE — 92250 FUNDUS PHOTOGRAPHY W/I&R: CPT | Performed by: OPHTHALMOLOGY

## 2023-03-30 PROCEDURE — 99207 FUNDUS PHOTOS OU (BOTH EYES): CPT | Mod: 26 | Performed by: OPHTHALMOLOGY

## 2023-03-30 PROCEDURE — G0463 HOSPITAL OUTPT CLINIC VISIT: HCPCS | Performed by: OPHTHALMOLOGY

## 2023-03-30 ASSESSMENT — CONF VISUAL FIELD
OD_SUPERIOR_NASAL_RESTRICTION: 0
OS_INFERIOR_NASAL_RESTRICTION: 0
OS_INFERIOR_TEMPORAL_RESTRICTION: 0
OS_SUPERIOR_NASAL_RESTRICTION: 0
OD_NORMAL: 1
OD_SUPERIOR_TEMPORAL_RESTRICTION: 0
OS_NORMAL: 1
OD_INFERIOR_TEMPORAL_RESTRICTION: 0
OD_INFERIOR_NASAL_RESTRICTION: 0
METHOD: COUNTING FINGERS
OS_SUPERIOR_TEMPORAL_RESTRICTION: 0

## 2023-03-30 ASSESSMENT — VISUAL ACUITY
METHOD: SNELLEN - LINEAR
OS_CC: 20/25
OD_CC: 20/20
OD_CC+: -1
OS_CC+: -2
CORRECTION_TYPE: GLASSES

## 2023-03-30 ASSESSMENT — REFRACTION_WEARINGRX
OS_AXIS: 045
OS_SPHERE: -3.00
OS_CYLINDER: +0.25
OD_SPHERE: -3.25
OS_ADD: +2.50
OD_AXIS: 150
OD_CYLINDER: +0.50
OD_ADD: +2.50

## 2023-03-30 ASSESSMENT — SLIT LAMP EXAM - LIDS
COMMENTS: NORMAL
COMMENTS: NORMAL

## 2023-03-30 ASSESSMENT — TONOMETRY
OD_IOP_MMHG: 18
OS_IOP_MMHG: 18
IOP_METHOD: TONOPEN

## 2023-03-30 ASSESSMENT — EXTERNAL EXAM - RIGHT EYE: OD_EXAM: NORMAL

## 2023-03-30 ASSESSMENT — CUP TO DISC RATIO
OS_RATIO: 0.5
OD_RATIO: 0.5

## 2023-03-30 ASSESSMENT — EXTERNAL EXAM - LEFT EYE: OS_EXAM: NORMAL

## 2023-03-30 NOTE — TELEPHONE ENCOUNTER
Spoke to pt at 1215    New right eye larger cellophane/cobweb like floater moving in eye since yesterday    Quick flashing times 3 days    No other noted vision changes    Scheduled with Dr. Sharp at 2 PM today    Pt aware of date/time/location/duration/non-[employer] insurance    PWB location    Leoncio Guillen RN 12:20 PM 03/30/23            [facility] Call Center    Phone Message    May a detailed message be left on voicemail: yes     Reason for Call: Symptoms or Concerns     If patient has red-flag symptoms, warm transfer to triage line    Current symptom or concern: Floaters, Obstructed Vision Rt Eye.  Patient spoke with a nurse help line, who advised her that she should be seen in the next few hours.  Please call.  Thank you.    Symptoms have been present for:  2 day(s)    Has patient previously been seen for this? No    By :     Date:     Are there any new or worsening symptoms? Yes: Getting worse.      Action Taken: Message routed to:  [facility] Ophthalmology    Travel Screening: Not Applicable

## 2023-03-30 NOTE — NURSING NOTE
Chief Complaints and History of Present Illnesses   Patient presents with     New Patient     Chief Complaint(s) and History of Present Illness(es)     New Patient            Laterality: right eye    Onset: 1 day ago    Associated symptoms: floaters.  Negative for dryness and photophobia    Treatments tried: no treatments    Pain scale: 0/10          Comments    New patient new right eye stringy floater for one day.  She has some flickering of light.  In 2019 she had a diagnosis of Glaucoma Suspect.  She has been unable to follow up with an eye doctor.  Yanet Guevara, COA, COA 2:53 PM 03/30/2023

## 2023-03-30 NOTE — PROGRESS NOTES
CC: Flickering and floater 1day duration OD  HPI: Kayleigh Sousa is a  67 year old year-old patient with history of HTN, Malignant neoplasm Estrogen receptor positive of left breast s/p mastectomy 2019, who presents for eval of new patient new right eye stringy floater for one day.  She has some flickering of light.  In 2019 she had a diagnosis of Glaucoma Suspect.  She has been unable to follow up with an eye doctor.  The patient has a family history of glaucoma (Mom). Previously was evaluated by Magda Jacob MD.      Retinal Imaging:  OCT 03/30/23  RE: vitreous debris, PHF attached at nerve head; detached from macula, normal foveal contour with no fluid  LE:VMT with no fluid, preserved contour with no fluid    Assessment & Plan:    #Impending PVD OD  -s/s of RD/RT discussed; pt knows to call with any  -vision threatening nature of RD discussed; potential tx of RD/Rt discussed    #VMA (vitreo macular adhesion) OS  -Mild   -Monitor   -Will repeat OCT in 4 weeks    #Cataracts OU  -potentially VS OS; pt desires to monitor at this time  -Reports some problem with glare at night   -Monitor at this time    RTC: 4 weeks, BAT; DFE OD only with 360   Retina detachment precautions were discussed with the patient (presence or increased in flashes, floaters or a curtain in the visual field) and was asked to return if any of the those occur   Braden Beyer MD MPH  Vitreoretinal Fellow PGY-5  [facility]         ~~~~~~~~~~~~~~~~~~~~~~~~~~~~~~~~~~   Complete documentation of historical and exam elements from today's encounter can be found in the full encounter summary report (not reduplicated in this progress note).  I personally obtained the chief complaint(s) and history of present illness.  I confirmed and edited as necessary the review of systems, past medical/surgical history, family history, social history, and examination findings as documented by others; and I examined the patient myself.  I    SCRIBE #2 NOTE: I, Katie Solorio, personally reviewed the relevant tests, images, and reports as documented above.  I personally reviewed the ophthalmic test(s) associated with this encounter, agree with the interpretation(s) as documented by the resident/fellow, and have edited the corresponding report(s) as necessary.   I formulated and edited as necessary the assessment and plan and discussed the findings and management plan with the patient and family    Shamika Sharp MD  Professor of Ophthalmology  Vitreo-Retinal surgeon   Department of Ophthalmology and Visual Neurosciences   [facility]  Phone: [phone]   Fax: [phone]

## 2023-04-13 ENCOUNTER — TELEPHONE (OUTPATIENT)
Dept: INTERNAL MEDICINE | Facility: CLINIC | Age: 68
End: 2023-04-13
Payer: COMMERCIAL

## 2023-04-13 DIAGNOSIS — I10 ESSENTIAL HYPERTENSION: ICD-10-CM

## 2023-04-13 RX ORDER — HYDROCHLOROTHIAZIDE 12.5 MG/1
CAPSULE ORAL
Qty: 60 CAPSULE | Refills: 0 | Status: SHIPPED | OUTPATIENT
Start: 2023-04-13 | End: 2023-05-08

## 2023-04-13 NOTE — TELEPHONE ENCOUNTER
Received call from pt  She wanted to know if  or or Dr. Pop are accepting     Advised to pt that they are not  Pt has an appt in June to establish care with Dr. Perez    Thank you  Sally Massey RN on 4/13/2023 at 3:36 PM

## 2023-05-08 ENCOUNTER — MYC MEDICAL ADVICE (OUTPATIENT)
Dept: INTERNAL MEDICINE | Facility: CLINIC | Age: 68
End: 2023-05-08
Payer: COMMERCIAL

## 2023-05-08 DIAGNOSIS — I10 ESSENTIAL HYPERTENSION: ICD-10-CM

## 2023-05-08 RX ORDER — HYDROCHLOROTHIAZIDE 12.5 MG/1
CAPSULE ORAL
Qty: 60 CAPSULE | Refills: 1 | Status: SHIPPED | OUTPATIENT
Start: 2023-05-08 | End: 2023-05-26

## 2023-05-08 NOTE — TELEPHONE ENCOUNTER
Pending Prescriptions:                       Disp   Refills    hydrochlorothiazide (MICROZIDE) 12.5 MG c*60 cap*0            Sig: Take 2 capsules by mouth once daily    Per [employer] message 5/8/23, patient is flying out on short notice to care for a family member and will run out of medication prior to coming back home.    Per chart, patient has a future appointment scheduled 6/30/23.    Routing refill request to provider for review/approval because:  Labs out of range:  Creat    Please advise, thanks.

## 2023-05-08 NOTE — TELEPHONE ENCOUNTER
We do not cover Dr. Wills until tomorrow, Dr. Wills is still in clinic please forward to Dr. Mendoza

## 2023-05-22 ENCOUNTER — MYC MEDICAL ADVICE (OUTPATIENT)
Dept: INTERNAL MEDICINE | Facility: CLINIC | Age: 68
End: 2023-05-22
Payer: COMMERCIAL

## 2023-05-22 DIAGNOSIS — I10 ESSENTIAL HYPERTENSION: ICD-10-CM

## 2023-05-26 RX ORDER — HYDROCHLOROTHIAZIDE 12.5 MG/1
2 CAPSULE ORAL DAILY
Qty: 60 CAPSULE | Refills: 0 | Status: SHIPPED | OUTPATIENT
Start: 2023-05-26 | End: 2023-05-30

## 2023-05-30 ENCOUNTER — PATIENT OUTREACH (OUTPATIENT)
Dept: ONCOLOGY | Facility: CLINIC | Age: 68
End: 2023-05-30

## 2023-05-30 ENCOUNTER — LAB (OUTPATIENT)
Dept: ONCOLOGY | Facility: CLINIC | Age: 68
End: 2023-05-30
Attending: INTERNAL MEDICINE
Payer: COMMERCIAL

## 2023-05-30 VITALS
OXYGEN SATURATION: 98 % | BODY MASS INDEX: 26.31 KG/M2 | RESPIRATION RATE: 16 BRPM | HEIGHT: 62 IN | WEIGHT: 143 LBS | HEART RATE: 65 BPM | SYSTOLIC BLOOD PRESSURE: 124 MMHG | TEMPERATURE: 98.2 F | DIASTOLIC BLOOD PRESSURE: 73 MMHG

## 2023-05-30 DIAGNOSIS — Z17.0 MALIGNANT NEOPLASM OF UPPER-OUTER QUADRANT OF LEFT BREAST IN FEMALE, ESTROGEN RECEPTOR POSITIVE (H): ICD-10-CM

## 2023-05-30 DIAGNOSIS — I10 ESSENTIAL HYPERTENSION: ICD-10-CM

## 2023-05-30 DIAGNOSIS — C50.412 MALIGNANT NEOPLASM OF UPPER-OUTER QUADRANT OF LEFT BREAST IN FEMALE, ESTROGEN RECEPTOR POSITIVE (H): ICD-10-CM

## 2023-05-30 DIAGNOSIS — C50.412 MALIGNANT NEOPLASM OF UPPER-OUTER QUADRANT OF LEFT BREAST IN FEMALE, ESTROGEN RECEPTOR POSITIVE (H): Primary | ICD-10-CM

## 2023-05-30 DIAGNOSIS — Z17.0 MALIGNANT NEOPLASM OF UPPER-OUTER QUADRANT OF LEFT BREAST IN FEMALE, ESTROGEN RECEPTOR POSITIVE (H): Primary | ICD-10-CM

## 2023-05-30 DIAGNOSIS — N28.9 KIDNEY DYSFUNCTION: ICD-10-CM

## 2023-05-30 LAB
ALBUMIN SERPL BCG-MCNC: 4.4 G/DL (ref 3.5–5.2)
ALP SERPL-CCNC: 112 U/L (ref 35–104)
ALT SERPL W P-5'-P-CCNC: 18 U/L (ref 10–35)
ANION GAP SERPL CALCULATED.3IONS-SCNC: 9 MMOL/L (ref 7–15)
AST SERPL W P-5'-P-CCNC: 31 U/L (ref 10–35)
BILIRUB SERPL-MCNC: 0.5 MG/DL
BUN SERPL-MCNC: 28.7 MG/DL (ref 8–23)
CALCIUM SERPL-MCNC: 9.7 MG/DL (ref 8.8–10.2)
CHLORIDE SERPL-SCNC: 101 MMOL/L (ref 98–107)
CREAT SERPL-MCNC: 1.92 MG/DL (ref 0.51–0.95)
DEPRECATED HCO3 PLAS-SCNC: 25 MMOL/L (ref 22–29)
ERYTHROCYTE [DISTWIDTH] IN BLOOD BY AUTOMATED COUNT: 12.7 % (ref 10–15)
GFR SERPL CREATININE-BSD FRML MDRD: 28 ML/MIN/1.73M2
GLUCOSE SERPL-MCNC: 101 MG/DL (ref 70–99)
HCT VFR BLD AUTO: 41.8 % (ref 35–47)
HGB BLD-MCNC: 13.4 G/DL (ref 11.7–15.7)
MCH RBC QN AUTO: 30.8 PG (ref 26.5–33)
MCHC RBC AUTO-ENTMCNC: 32.1 G/DL (ref 31.5–36.5)
MCV RBC AUTO: 96 FL (ref 78–100)
PLATELET # BLD AUTO: 217 10E3/UL (ref 150–450)
POTASSIUM SERPL-SCNC: 5.5 MMOL/L (ref 3.4–5.3)
PROT SERPL-MCNC: 7.5 G/DL (ref 6.4–8.3)
RBC # BLD AUTO: 4.35 10E6/UL (ref 3.8–5.2)
SODIUM SERPL-SCNC: 135 MMOL/L (ref 136–145)
WBC # BLD AUTO: 2.6 10E3/UL (ref 4–11)

## 2023-05-30 PROCEDURE — G0463 HOSPITAL OUTPT CLINIC VISIT: HCPCS | Performed by: INTERNAL MEDICINE

## 2023-05-30 PROCEDURE — 80053 COMPREHEN METABOLIC PANEL: CPT | Performed by: INTERNAL MEDICINE

## 2023-05-30 PROCEDURE — 36415 COLL VENOUS BLD VENIPUNCTURE: CPT

## 2023-05-30 PROCEDURE — 85027 COMPLETE CBC AUTOMATED: CPT | Performed by: INTERNAL MEDICINE

## 2023-05-30 PROCEDURE — 99213 OFFICE O/P EST LOW 20 MIN: CPT | Performed by: INTERNAL MEDICINE

## 2023-05-30 RX ORDER — HYDROCHLOROTHIAZIDE 12.5 MG/1
2 CAPSULE ORAL DAILY
Qty: 60 CAPSULE | Refills: 0 | Status: SHIPPED | OUTPATIENT
Start: 2023-05-30 | End: 2023-06-30

## 2023-05-30 ASSESSMENT — PAIN SCALES - GENERAL: PAINLEVEL: NO PAIN (0)

## 2023-05-30 NOTE — LETTER
"    5/30/2023         RE: Kayleigh Sousa  [address]  [facility] [address]        Dear Colleague,    Thank you for referring your patient, Kayleigh Sousa, to the [facility]. Please see a copy of my visit note below.    Oncology Rooming Note    May 30, 2023 8:11 AM   Kayleigh Sousa is a 67 year old female who presents for:    Chief Complaint   Patient presents with     Oncology Clinic Visit     Invasive ductal carcinoma of breast, female, left (H)  History of breast cancer       Initial Vitals: /73   Pulse 65   Temp 98.2  F (36.8  C) (Oral)   Resp 16   Ht 1.581 m (5' 2.25\")   Wt 64.9 kg (143 lb)   LMP  (LMP Unknown)   SpO2 98%   BMI 25.95 kg/m   Estimated body mass index is 25.95 kg/m  as calculated from the following:    Height as of this encounter: 1.581 m (5' 2.25\").    Weight as of this encounter: 64.9 kg (143 lb). Body surface area is 1.69 meters squared.  No Pain (0) Comment: Data Unavailable   No LMP recorded (lmp unknown). Patient is postmenopausal.  Allergies reviewed: Yes  Medications reviewed: Yes    Medications: MEDICATION REFILLS NEEDED TODAY. Provider was notified. HYDROCHLOROTHIAZIDE  Pharmacy name entered into [employer]:    [id number] - [place], [place] - [address] [facility]  [id number] - [place], [place] - [address]    Clinical concerns: follow up     Lilly Chavez              Kayeligh Sousa is a 67-year-old patient who comes in today for interim followup.     CHIEF COMPLAINT:  1.  History of right-sided DCIS diagnosed in 2018, status post lumpectomy and radiation therapy.  2.  Left-sided breast cancer diagnosed in 2019.  Stage of disease was a T2 N1 M0, ER/HI positive, HER-2 receptor negative tumor.  3.  Now status post bilateral mastectomies.  4.  Oncotype in the low risk of recurrence range  5. Stage 3 kidney disease followed by Dr Mccann   6. Hx hypertension        HISTORY OF PRESENT ILLNESS:  " Kayleigh is a 67-year-old postmenopausal patient with a history of bilateral breast cancer as outlined above.  The most recent breast cancer was on the left side, it was a T2 N1 tumor, ER/NH positive, HER-2 receptor negative, and she had an Oncotype, which came back with a recurrence score of 9.  She did post-mastectomy radiation therapy and then started on hormonal treatment, but got quite a bit of side effects from aromatase inhibitors, so now she elected to do close observation.  She has no major complaints coming in today.  She denies cough, shortness of breath, bone pain, or any worrisome symptomatology.  She does also suffer from some renal dysfunction and follows with nephrology for that.     FAMILY HISTORY:  First cousin and paternal cousin with breast cancer.    ROS    Feels well , no current complaints   Would like renal function checked today     PAST MEDICAL HISTORY:  History of hyperaldosteronism, history of renal insufficiency, history of hypertension, history of bilateral breast cancer as outlined above.      SOCIAL HISTORY:  The patient is .  She has 2 children in their 40s.     PAIN ASSESSMENT:  The patient denies pain today.     PHYSICAL EXAMINATION:    GENERAL:  She is a well-appearing lady, in no acute distress.  VITAL SIGNS:  Stable  NECK:  Has no masses or goiter.  CHEST:  Clear to auscultation and percussion bilaterally.  HEART:  Sounds 1 and 2 normal, no added sounds, no murmurs.   BREASTS:  The patient is status post bilateral mastectomies.  The scars look good.  Right and left axillae are negative.  GASTROINTESTINAL:  Abdomen is soft and nontender.  No hepatosplenomegaly.  EXTREMITIES:  Legs without tenderness or edema.  NEUROLOGIC:  Peripheral neurological exam grossly intact.  SKIN:  Has no rashes or lesions.      Labs Pending at time of dictation     IMP /Plan      67 year old with hx of R dcis and L breast cancer   Feeling well today with no concerning complaints from my standpoint    Will review labs   Off all hormonal Rx due to intolerance     RTC 6 months       Victor M Vargas MD                  Again, thank you for allowing me to participate in the care of your patient.        Sincerely,        Victor M Vargas MD

## 2023-05-30 NOTE — PROGRESS NOTES
Medical Assistant Note:  Kayleigh Sousa presents today for blood draw.    Patient seen by provider today: Yes: Dr Vargas.   present during visit today: Not Applicable.    Concerns: No Concerns.    Procedure:  Lab draw site: rt antecub, Needle type: butterfly, Gauge: 23.    Post Assessment:  Labs drawn without difficulty: Yes.    Discharge Plan:  Departure Mode: Ambulatory.    Face to Face Time: 10.    Radhika Maguire CMA

## 2023-05-30 NOTE — PROGRESS NOTES
Per Dr. Vargas's request, writer faxed Kayleigh's CBC and CMP results from today to Dr. Monie Mccann with [facility]. Receipt confirmed via RightFax to [phone].    Ana Rodrigez, RN, BSN, OCN  Nurse Care Coordinator  [facility]  P: [phone]     F: [phone]

## 2023-05-30 NOTE — PROGRESS NOTES
"Oncology Rooming Note    May 30, 2023 8:11 AM   Kayleigh Souas is a 67 year old female who presents for:    Chief Complaint   Patient presents with     Oncology Clinic Visit     Invasive ductal carcinoma of breast, female, left (H)  History of breast cancer       Initial Vitals: /73   Pulse 65   Temp 98.2  F (36.8  C) (Oral)   Resp 16   Ht 1.581 m (5' 2.25\")   Wt 64.9 kg (143 lb)   LMP  (LMP Unknown)   SpO2 98%   BMI 25.95 kg/m   Estimated body mass index is 25.95 kg/m  as calculated from the following:    Height as of this encounter: 1.581 m (5' 2.25\").    Weight as of this encounter: 64.9 kg (143 lb). Body surface area is 1.69 meters squared.  No Pain (0) Comment: Data Unavailable   No LMP recorded (lmp unknown). Patient is postmenopausal.  Allergies reviewed: Yes  Medications reviewed: Yes    Medications: MEDICATION REFILLS NEEDED TODAY. Provider was notified. HYDROCHLOROTHIAZIDE  Pharmacy name entered into [employer]:    [id number] - [place], [place] - [address] [facility]  [id number] - [place], [place] - [address]    Clinical concerns: follow up     Lilly Chavez            "

## 2023-05-30 NOTE — PROGRESS NOTES
Kayleigh Sousa is a 67-year-old patient who comes in today for interim followup.     CHIEF COMPLAINT:  1.  History of right-sided DCIS diagnosed in 2018, status post lumpectomy and radiation therapy.  2.  Left-sided breast cancer diagnosed in 2019.  Stage of disease was a T2 N1 M0, ER/ID positive, HER-2 receptor negative tumor.  3.  Now status post bilateral mastectomies.  4.  Oncotype in the low risk of recurrence range  5. Stage 3 kidney disease followed by Dr Mccann   6. Hx hypertension        HISTORY OF PRESENT ILLNESS:  Kayleigh is a 67-year-old postmenopausal patient with a history of bilateral breast cancer as outlined above.  The most recent breast cancer was on the left side, it was a T2 N1 tumor, ER/ID positive, HER-2 receptor negative, and she had an Oncotype, which came back with a recurrence score of 9.  She did post-mastectomy radiation therapy and then started on hormonal treatment, but got quite a bit of side effects from aromatase inhibitors, so now she elected to do close observation.  She has no major complaints coming in today.  She denies cough, shortness of breath, bone pain, or any worrisome symptomatology.  She does also suffer from some renal dysfunction and follows with nephrology for that.     FAMILY HISTORY:  First cousin and paternal cousin with breast cancer.    STEPHANE    Feels well , no current complaints   Would like renal function checked today     PAST MEDICAL HISTORY:  History of hyperaldosteronism, history of renal insufficiency, history of hypertension, history of bilateral breast cancer as outlined above.      SOCIAL HISTORY:  The patient is .  She has 2 children in their 40s.     PAIN ASSESSMENT:  The patient denies pain today.     PHYSICAL EXAMINATION:    GENERAL:  She is a well-appearing lady, in no acute distress.  VITAL SIGNS:  Stable  NECK:  Has no masses or goiter.  CHEST:  Clear to auscultation and percussion bilaterally.  HEART:  Sounds 1 and 2 normal, no added sounds,  no murmurs.   BREASTS:  The patient is status post bilateral mastectomies.  The scars look good.  Right and left axillae are negative.  GASTROINTESTINAL:  Abdomen is soft and nontender.  No hepatosplenomegaly.  EXTREMITIES:  Legs without tenderness or edema.  NEUROLOGIC:  Peripheral neurological exam grossly intact.  SKIN:  Has no rashes or lesions.      Labs Pending at time of dictation     IMP /Plan      67 year old with hx of R dcis and L breast cancer   Feeling well today with no concerning complaints from my standpoint   Will review labs   Off all hormonal Rx due to intolerance     RTC 6 months       Victor M Vargas MD

## 2023-06-19 DIAGNOSIS — I10 BENIGN ESSENTIAL HYPERTENSION: ICD-10-CM

## 2023-06-20 RX ORDER — LISINOPRIL 40 MG/1
TABLET ORAL
Qty: 90 TABLET | Refills: 0 | Status: SHIPPED | OUTPATIENT
Start: 2023-06-20 | End: 2023-06-30

## 2023-06-20 NOTE — TELEPHONE ENCOUNTER
Pending Prescriptions:                       Disp   Refills    lisinopril (ZESTRIL) 40 MG tablet [Pharmac*90 tab*0        Sig: Take 1 tablet by mouth once daily    Routing refill request to provider for review/approval because:  Needs provider review

## 2023-06-26 DIAGNOSIS — N18.9 CHRONIC KIDNEY DISEASE, UNSPECIFIED: Primary | ICD-10-CM

## 2023-06-30 ENCOUNTER — OFFICE VISIT (OUTPATIENT)
Dept: PEDIATRICS | Facility: CLINIC | Age: 68
End: 2023-06-30
Payer: COMMERCIAL

## 2023-06-30 VITALS
OXYGEN SATURATION: 97 % | TEMPERATURE: 97.1 F | DIASTOLIC BLOOD PRESSURE: 68 MMHG | HEART RATE: 62 BPM | WEIGHT: 140 LBS | HEIGHT: 65 IN | SYSTOLIC BLOOD PRESSURE: 102 MMHG | RESPIRATION RATE: 16 BRPM | BODY MASS INDEX: 23.32 KG/M2

## 2023-06-30 DIAGNOSIS — E26.9 HYPERALDOSTERONISM (H): ICD-10-CM

## 2023-06-30 DIAGNOSIS — Z00.00 ENCOUNTER FOR MEDICARE ANNUAL WELLNESS EXAM: Primary | ICD-10-CM

## 2023-06-30 DIAGNOSIS — N18.32 STAGE 3B CHRONIC KIDNEY DISEASE (H): ICD-10-CM

## 2023-06-30 DIAGNOSIS — Z13.220 SCREENING CHOLESTEROL LEVEL: ICD-10-CM

## 2023-06-30 DIAGNOSIS — I10 BENIGN ESSENTIAL HYPERTENSION: ICD-10-CM

## 2023-06-30 PROBLEM — Z90.49 S/P APPENDECTOMY: Status: ACTIVE | Noted: 2023-06-30

## 2023-06-30 PROBLEM — Z90.13 S/P MASTECTOMY, BILATERAL: Status: ACTIVE | Noted: 2023-06-30

## 2023-06-30 LAB
ANION GAP SERPL CALCULATED.3IONS-SCNC: 10 MMOL/L (ref 7–15)
BUN SERPL-MCNC: 40.1 MG/DL (ref 8–23)
CALCIUM SERPL-MCNC: 9.9 MG/DL (ref 8.8–10.2)
CHLORIDE SERPL-SCNC: 105 MMOL/L (ref 98–107)
CHOLEST SERPL-MCNC: 155 MG/DL
CREAT SERPL-MCNC: 1.83 MG/DL (ref 0.51–0.95)
CREAT UR-MCNC: 114 MG/DL
DEPRECATED HCO3 PLAS-SCNC: 25 MMOL/L (ref 22–29)
GFR SERPL CREATININE-BSD FRML MDRD: 30 ML/MIN/1.73M2
GLUCOSE SERPL-MCNC: 101 MG/DL (ref 70–99)
HDLC SERPL-MCNC: 67 MG/DL
LDLC SERPL CALC-MCNC: 71 MG/DL
MICROALBUMIN UR-MCNC: <12 MG/L
MICROALBUMIN/CREAT UR: NORMAL MG/G{CREAT}
NONHDLC SERPL-MCNC: 88 MG/DL
POTASSIUM SERPL-SCNC: 5.5 MMOL/L (ref 3.4–5.3)
SODIUM SERPL-SCNC: 140 MMOL/L (ref 136–145)
TRIGL SERPL-MCNC: 85 MG/DL

## 2023-06-30 PROCEDURE — G0439 PPPS, SUBSEQ VISIT: HCPCS | Performed by: STUDENT IN AN ORGANIZED HEALTH CARE EDUCATION/TRAINING PROGRAM

## 2023-06-30 PROCEDURE — 80048 BASIC METABOLIC PNL TOTAL CA: CPT | Performed by: STUDENT IN AN ORGANIZED HEALTH CARE EDUCATION/TRAINING PROGRAM

## 2023-06-30 PROCEDURE — 99214 OFFICE O/P EST MOD 30 MIN: CPT | Mod: 25 | Performed by: STUDENT IN AN ORGANIZED HEALTH CARE EDUCATION/TRAINING PROGRAM

## 2023-06-30 PROCEDURE — 82043 UR ALBUMIN QUANTITATIVE: CPT | Performed by: STUDENT IN AN ORGANIZED HEALTH CARE EDUCATION/TRAINING PROGRAM

## 2023-06-30 PROCEDURE — 82570 ASSAY OF URINE CREATININE: CPT | Performed by: STUDENT IN AN ORGANIZED HEALTH CARE EDUCATION/TRAINING PROGRAM

## 2023-06-30 PROCEDURE — 36415 COLL VENOUS BLD VENIPUNCTURE: CPT | Performed by: STUDENT IN AN ORGANIZED HEALTH CARE EDUCATION/TRAINING PROGRAM

## 2023-06-30 PROCEDURE — 80061 LIPID PANEL: CPT | Performed by: STUDENT IN AN ORGANIZED HEALTH CARE EDUCATION/TRAINING PROGRAM

## 2023-06-30 RX ORDER — HYDROCHLOROTHIAZIDE 12.5 MG/1
12.5 CAPSULE ORAL DAILY
Qty: 90 CAPSULE | Refills: 4 | Status: SHIPPED | OUTPATIENT
Start: 2023-06-30 | End: 2023-07-13

## 2023-06-30 RX ORDER — AMLODIPINE BESYLATE 10 MG/1
10 TABLET ORAL DAILY
Qty: 90 TABLET | Refills: 4 | Status: SHIPPED | OUTPATIENT
Start: 2023-06-30 | End: 2023-08-01

## 2023-06-30 RX ORDER — SPIRONOLACTONE 100 MG/1
100 TABLET, FILM COATED ORAL DAILY
Qty: 90 TABLET | Refills: 4 | Status: SHIPPED | OUTPATIENT
Start: 2023-06-30 | End: 2023-07-13

## 2023-06-30 RX ORDER — LISINOPRIL 40 MG/1
40 TABLET ORAL DAILY
Qty: 90 TABLET | Refills: 4 | Status: SHIPPED | OUTPATIENT
Start: 2023-06-30 | End: 2024-07-08

## 2023-06-30 SDOH — ECONOMIC STABILITY: FOOD INSECURITY: WITHIN THE PAST 12 MONTHS, THE FOOD YOU BOUGHT JUST DIDN'T LAST AND YOU DIDN'T HAVE MONEY TO GET MORE.: NEVER TRUE

## 2023-06-30 SDOH — ECONOMIC STABILITY: INCOME INSECURITY: HOW HARD IS IT FOR YOU TO PAY FOR THE VERY BASICS LIKE FOOD, HOUSING, MEDICAL CARE, AND HEATING?: NOT HARD AT ALL

## 2023-06-30 SDOH — ECONOMIC STABILITY: INCOME INSECURITY: IN THE LAST 12 MONTHS, WAS THERE A TIME WHEN YOU WERE NOT ABLE TO PAY THE MORTGAGE OR RENT ON TIME?: NO

## 2023-06-30 SDOH — ECONOMIC STABILITY: FOOD INSECURITY: WITHIN THE PAST 12 MONTHS, YOU WORRIED THAT YOUR FOOD WOULD RUN OUT BEFORE YOU GOT MONEY TO BUY MORE.: NEVER TRUE

## 2023-06-30 SDOH — HEALTH STABILITY: PHYSICAL HEALTH: ON AVERAGE, HOW MANY DAYS PER WEEK DO YOU ENGAGE IN MODERATE TO STRENUOUS EXERCISE (LIKE A BRISK WALK)?: 5 DAYS

## 2023-06-30 SDOH — HEALTH STABILITY: PHYSICAL HEALTH: ON AVERAGE, HOW MANY MINUTES DO YOU ENGAGE IN EXERCISE AT THIS LEVEL?: 30 MIN

## 2023-06-30 SDOH — ECONOMIC STABILITY: TRANSPORTATION INSECURITY
IN THE PAST 12 MONTHS, HAS THE LACK OF TRANSPORTATION KEPT YOU FROM MEDICAL APPOINTMENTS OR FROM GETTING MEDICATIONS?: NO

## 2023-06-30 SDOH — ECONOMIC STABILITY: TRANSPORTATION INSECURITY
IN THE PAST 12 MONTHS, HAS LACK OF TRANSPORTATION KEPT YOU FROM MEETINGS, WORK, OR FROM GETTING THINGS NEEDED FOR DAILY LIVING?: NO

## 2023-06-30 ASSESSMENT — SOCIAL DETERMINANTS OF HEALTH (SDOH)
HOW OFTEN DO YOU GET TOGETHER WITH FRIENDS OR RELATIVES?: THREE TIMES A WEEK
IN A TYPICAL WEEK, HOW MANY TIMES DO YOU TALK ON THE PHONE WITH FAMILY, FRIENDS, OR NEIGHBORS?: MORE THAN THREE TIMES A WEEK
HOW OFTEN DO YOU ATTEND CHURCH OR RELIGIOUS SERVICES?: MORE THAN 4 TIMES PER YEAR
DO YOU BELONG TO ANY CLUBS OR ORGANIZATIONS SUCH AS CHURCH GROUPS UNIONS, FRATERNAL OR ATHLETIC GROUPS, OR SCHOOL GROUPS?: YES

## 2023-06-30 ASSESSMENT — ENCOUNTER SYMPTOMS
HEMATOCHEZIA: 0
HEMATURIA: 0
ABDOMINAL PAIN: 0
CHILLS: 1
COUGH: 0
CONSTIPATION: 0

## 2023-06-30 ASSESSMENT — LIFESTYLE VARIABLES
HOW OFTEN DO YOU HAVE A DRINK CONTAINING ALCOHOL: NEVER
SKIP TO QUESTIONS 9-10: 1
HOW OFTEN DO YOU HAVE SIX OR MORE DRINKS ON ONE OCCASION: NEVER
HOW MANY STANDARD DRINKS CONTAINING ALCOHOL DO YOU HAVE ON A TYPICAL DAY: PATIENT DOES NOT DRINK
AUDIT-C TOTAL SCORE: 0

## 2023-06-30 ASSESSMENT — PAIN SCALES - GENERAL: PAINLEVEL: NO PAIN (0)

## 2023-06-30 ASSESSMENT — ACTIVITIES OF DAILY LIVING (ADL): CURRENT_FUNCTION: NO ASSISTANCE NEEDED

## 2023-06-30 NOTE — PROGRESS NOTES
"SUBJECTIVE:   Kayleigh is a 67 year old who presents for Preventive Visit.      6/30/2023     7:18 AM   Additional Questions   Roomed by Marva         6/30/2023     7:18 AM   Patient Reported Additional Medications   Patient reports taking the following new medications None     Are you in the first 12 months of your Medicare coverage?  No    Healthy Habits:     In general, how would you rate your overall health?  Fair    Frequency of exercise:  4-5 days/week    Duration of exercise:  15-30 minutes    Do you usually eat at least 4 servings of fruit and vegetables a day, include whole grains    & fiber and avoid regularly eating high fat or \"junk\" foods?  No    Taking medications regularly:  Yes    Barriers to taking medications:  None    Medication side effects:  None    Ability to successfully perform activities of daily living:  No assistance needed    Home Safety:  No safety concerns identified    Hearing Impairment:  No hearing concerns    In the past 6 months, have you been bothered by leaking of urine? Yes    In general, how would you rate your overall mental or emotional health?  Fair    Additional concerns today:  No    Recent travel to [place], [place], [place]  -getting more social; was reserved during pandemic due to medical history  , 2 kids  Rastafarian  Volunteer  Exercise group on zoom 3x per week      Have you ever done Advance Care Planning? (For example, a Health Directive, POLST, or a discussion with a medical provider or your loved ones about your wishes): Yes, patient states has an Advance Care Planning document and will bring a copy to the clinic.       Fall risk  Fallen 2 or more times in the past year?: No  Any fall with injury in the past year?: No    Cognitive Screening   1) Repeat 3 items (Leader, Season, Table)    2) Clock draw: NORMAL  3) 3 item recall: Recalls 3 objects  Results: 3 items recalled: COGNITIVE IMPAIRMENT LESS LIKELY    Mini-CogTM Copyright S Danielle. Licensed by the " author for use in [facility]; reprinted with permission (sheree@[facility]). All rights reserved.      Do you have sleep apnea, excessive snoring or daytime drowsiness?: no    Reviewed and updated as needed this visit by clinical staff   Tobacco  Allergies  Meds  Problems             Reviewed and updated as needed this visit by Provider    Allergies  Meds  Problems            Social History     Tobacco Use     Smoking status: Never     Smokeless tobacco: Never   Substance Use Topics     Alcohol use: Not Currently             6/30/2023     7:13 AM   Alcohol Use   Prescreen: >3 drinks/day or >7 drinks/week? Not Applicable     Do you have a current opioid prescription? No  Do you use any other controlled substances or medications that are not prescribed by a provider? None        Current providers sharing in care for this patient include:   Patient Care Team:  Marianela Perez MD as PCP - General (Internal Medicine - Pediatrics)  Yanna Thompson MD as MD (Hematology & Oncology)  Jere Brown MD as MD (Hematology & Oncology)  Victor M Vargas MD as Assigned Cancer Care Provider  Shannan Cline MD as Hospitalist (Endocrinology, Diabetes, and Metabolism)  Edmund Mittal MD as Assigned PCP  Shannan Cline MD as Assigned Endocrinology Provider  Shamika Sharp MD as Assigned Surgical Provider    The following health maintenance items are reviewed in Epic and correct as of today:  Health Maintenance   Topic Date Due     ZOSTER IMMUNIZATION (2 of 3) 08/14/2017     MICROALBUMIN  08/24/2021     ANNUAL REVIEW OF HM ORDERS  02/24/2022     COVID-19 Vaccine (6 - Pfizer series) 02/13/2023     LIPID  06/27/2023     INFLUENZA VACCINE (Season Ended) 09/01/2023     BMP  05/30/2024     HEMOGLOBIN  05/30/2024     MEDICARE ANNUAL WELLNESS VISIT  06/30/2024     FALL RISK ASSESSMENT  06/30/2024     COLORECTAL CANCER SCREENING  07/13/2025     DTAP/TDAP/TD IMMUNIZATION (3 - Td or  "Tdap) 05/11/2026     ADVANCE CARE PLANNING  06/30/2028     DEXA  12/17/2034     PARATHYROID  Completed     PHOSPHORUS  Completed     HEPATITIS C SCREENING  Completed     PHQ-2 (once per calendar year)  Completed     URINALYSIS  Completed     ALK PHOS  Completed     Pneumococcal Vaccine: 65+ Years  Addressed     IPV IMMUNIZATION  Aged Out     MENINGITIS IMMUNIZATION  Aged Out     MAMMO SCREENING  Discontinued       FHS-7:        No data to display                Mammogram Screening: Recommended mammography every 1-2 years with patient discussion and risk factor consideration  Pertinent mammograms are reviewed under the imaging tab.    Review of Systems   Constitutional: Positive for chills.   HENT: Negative for congestion.    Respiratory: Negative for cough.    Cardiovascular: Negative for chest pain.   Gastrointestinal: Negative for abdominal pain, constipation and hematochezia.   Genitourinary: Negative for hematuria.       OBJECTIVE:   /68   Pulse 62   Temp 97.1  F (36.2  C)   Resp 16   Ht 1.638 m (5' 4.5\")   Wt 63.5 kg (140 lb)   LMP  (LMP Unknown)   SpO2 97%   BMI 23.66 kg/m   Estimated body mass index is 23.66 kg/m  as calculated from the following:    Height as of this encounter: 1.638 m (5' 4.5\").    Weight as of this encounter: 63.5 kg (140 lb).  Physical Exam  GENERAL: healthy, alert and no distress  EYES: Eyes grossly normal to inspection, and conjunctivae and sclerae normal  HENT: ear canals and TM's normal  NECK: no adenopathy, no asymmetry, masses, or scars and thyroid normal to palpation  RESP: lungs clear to auscultation - no rales, rhonchi or wheezes  CV: regular rate and rhythm, normal S1 S2, no S3 or S4, no murmur, click or rub, no peripheral edema and peripheral pulses strong  ABDOMEN: soft, nontender, no hepatosplenomegaly, no masses   MS: no gross musculoskeletal defects noted, no edema  SKIN: no suspicious lesions or rashes  NEURO: Normal strength and tone, mentation intact and " "speech normal  PSYCH: mentation appears normal, affect normal/bright      ASSESSMENT / PLAN:       ICD-10-CM    1. Encounter for Medicare annual wellness exam  Z00.00       2. Stage 3b chronic kidney disease (H)  N18.32 Albumin Random Urine Quantitative with Creat Ratio     Basic metabolic panel     Albumin Random Urine Quantitative with Creat Ratio      3. Benign essential hypertension  I10 amLODIPine (NORVASC) 10 MG tablet     hydrochlorothiazide (MICROZIDE) 12.5 MG capsule     lisinopril (ZESTRIL) 40 MG tablet     spironolactone (ALDACTONE) 100 MG tablet      4. Hyperaldosteronism (H)  E26.9 spironolactone (ALDACTONE) 100 MG tablet      5. Screening cholesterol level  Z13.220 Lipid panel reflex to direct LDL Non-fasting     Lipid panel reflex to direct LDL Non-fasting        2. Follows with Dr. Mccann of nephrology. On ACE-I.  3. Blood pressure borderline low today and patient with intermittent symptoms of orthostasis. Reports peripheral edema that is helped by hydrochlorothiazide. In shared decision making with patient will decrease hydrochlorothiazide to 12.5mg daily and have nurse only blood pressure check in 2 weeks. Consider decrease amlodipine (Norvasc) to 5mg as next step if needed.    COUNSELING:  Reviewed preventive health counseling, as reflected in patient instructions      BMI:   Estimated body mass index is 23.66 kg/m  as calculated from the following:    Height as of this encounter: 1.638 m (5' 4.5\").    Weight as of this encounter: 63.5 kg (140 lb).         She reports that she has never smoked. She has never used smokeless tobacco.      Appropriate preventive services were discussed with this patient, including applicable screening as appropriate for cardiovascular disease, diabetes, osteopenia/osteoporosis, and glaucoma.  As appropriate for age/gender, discussed screening for colorectal cancer, prostate cancer, breast cancer, and cervical cancer. Checklist reviewing preventive services available " has been given to the patient.    Reviewed patients plan of care and provided an AVS. The Basic Care Plan (routine screening as documented in Health Maintenance) for Kayleigh meets the Care Plan requirement. This Care Plan has been established and reviewed with the Patient.          Marianela Perez MD  [facility]    Identified Health Risks:    I have reviewed Opioid Use Disorder and Substance Use Disorder risk factors and made any needed referrals.       The patient was provided with suggestions to help her develop a healthy physical lifestyle.  The patient was counseled and encouraged to consider modifying their diet and eating habits. She was provided with information on recommended healthy diet options.  Information on urinary incontinence and treatment options given to patient.  The patient was provided with suggestions to help her develop a healthy emotional lifestyle.

## 2023-06-30 NOTE — PATIENT INSTRUCTIONS
So nice to meet you!    Go down on the hydrochlorothiazide to 1 pill per day  Blood pressure check in 2 weeks          Patient Education   Personalized Prevention Plan  You are due for the preventive services outlined below.  Your care team is available to assist you in scheduling these services.  If you have already completed any of these items, please share that information with your care team to update in your medical record.  Health Maintenance Due   Topic Date Due    Zoster (Shingles) Vaccine (2 of 3) 08/14/2017    Kidney Microalbumin Urine Test  08/24/2021    ANNUAL REVIEW OF HM ORDERS  02/24/2022    COVID-19 Vaccine (6 - Pfizer series) 02/13/2023    Cholesterol Lab  06/27/2023     Your Health Risk Assessment indicates you feel you are not in good health    A healthy lifestyle helps keep the body fit and the mind alert. It helps protect you from disease, helps you fight disease, and helps prevent chronic disease (disease that doesn't go away) from getting worse. This is important as you get older and begin to notice twinges in muscles and joints and a decline in the strength and stamina you once took for granted. A healthy lifestyle includes good healthcare, good nutrition, weight control, recreation, and regular exercise. Avoid harmful substances and do what you can to keep safe. Another part of a healthy lifestyle is stay mentally active and socially involved.    Good healthcare   Have a wellness visit every year.   If you have new symptoms, let us know right away. Don't wait until the next checkup.   Take medicines exactly as prescribed and keep your medicines in a safe place. Tell us if your medicine causes problems.   Healthy diet and weight control   Eat 3 or 4 small, nutritious, low-fat, high-fiber meals a day. Include a variety of fruits, vegetables, and whole-grain foods.   Make sure you get enough calcium in your diet. Calcium, vitamin D, and exercise help prevent osteoporosis (bone thinning).   If  you live alone, try eating with others when you can. That way you get a good meal and have company while you eat it.   Try to keep a healthy weight. If you eat more calories than your body uses for energy, it will be stored as fat and you will gain weight.     Recreation   Recreation is not limited to sports and team events. It includes any activity that provides relaxation, interest, enjoyment, and exercise. Recreation provides an outlet for physical, mental, and social energy. It can give a sense of worth and achievement. It can help you stay healthy.    Mental Exercise and Social Involvement  Mental and emotional health is as important as physical health. Keep in touch with friends and family. Stay as active as possible. Continue to learn and challenge yourself.   Things you can do to stay mentally active are:  Learn something new, like a foreign language or musical instrument.   Play SCRABBLE or do crossword puzzles. If you cannot find people to play these games with you at home, you can play them with others on your computer through the Internet.   Join a games club--anything from card games to chess or checkers or lawn bowling.   Start a new hobby.   Go back to school.   Volunteer.   Read.   Keep up with world events.    Understanding USDA MyPlate  The USDA has guidelines to help you make healthy food choices. These are called MyPlate. MyPlate shows the food groups that make up healthy meals using the image of a place setting. Before you eat, think about the healthiest choices for what to put on your plate or in your cup or bowl. To learn more about building a healthy plate, visit www.choosemyplate.gov.     The food groups  Fruits. Any fruit or 100% fruit juice counts as part of the Fruit Group. Fruits may be fresh, canned, frozen, or dried, and may be whole, cut-up, or pureed. Make 1/2 of your plate fruits and vegetables.  Vegetables. Any vegetable or 100% vegetable juice counts as a member of the Vegetable  Group. Vegetables may be fresh, frozen, canned, or dried. They can be served raw or cooked and may be whole, cut-up, or mashed. Make 1/2 of your plate fruits and vegetables.  Grains. All foods made from grains are part of the Grains Group. These include wheat, rice, oats, cornmeal, and barley. Grains are often used to make foods such as bread, pasta, oatmeal, cereal, tortillas, and grits. Grains should be no more than 1/4 of your plate. At least half of your grains should be whole grains.  Protein. This group includes meat, poultry, seafood, beans and peas, eggs, processed soy products (such as tofu), nuts (including nut butters), and seeds. Make protein choices no more than 1/4 of your plate. Meat and poultry choices should be lean or low fat.  Dairy. The Dairy Group includes all fluid milk products and foods made from milk that contain calcium, such as yogurt and cheese. (Foods that have little calcium, such as cream, butter, and cream cheese, are not part of this group.) Most dairy choices should be low-fat or fat-free.  Oils. Oils aren't a food group, but they do contain essential nutrients. However it's important to watch your intake of oils. These are fats that are liquid at room temperature. They include canola, corn, olive, soybean, vegetable, and sunflower oil. Foods that are mainly oil include mayonnaise, certain salad dressings, and soft margarines. You likely already get your daily oil allowance from the foods you eat.  Things to limit  Eating healthy also means limiting these things in your diet:  Salt (sodium). Many processed foods have a lot of sodium. To keep sodium intake down, eat fresh vegetables, meats, poultry, and seafood when possible. Purchase low-sodium, reduced-sodium, or no-salt-added food products at the store. And don't add salt to your meals at home. Instead, season them with herbs and spices such as dill, oregano, cumin, and paprika. Or try adding flavor with lemon or lime zest and  juice.  Saturated fat. Saturated fats are most often found in animal products such as beef, pork, and chicken. They are often solid at room temperature, such as butter. To reduce your saturated fat intake, choose leaner cuts of meat and poultry. And try healthier cooking methods such as grilling, broiling, roasting, or baking. For a simple lower-fat swap, use plain nonfat yogurt instead of mayonnaise when making potato salad or macaroni salad.  Added sugars. These are sugars added to foods. They are in foods such as ice cream, candy, soda, fruit drinks, sports drinks, energy drinks, cookies, pastries, jams, and syrups. Cut down on added sugars by sharing sweet treats with a family member or friend. You can also choose fruit for dessert, and drink water or other unsweetened beverages.  The [employer] last reviewed this educational content on 6/1/2020 2000-2022 [employer]. All rights reserved. This information is not intended as a substitute for professional medical care. Always follow your healthcare professional's instructions.          Urinary Incontinence, Female (Adult)   Urinary incontinence means loss of bladder control. This problem affects many women, especially as they get older. If you have incontinence, you may be embarrassed to ask for help. But know that this problem can be treated.   Types of Incontinence  There are different types of incontinence. Two of the main types are described here. You can have more than one type.   Stress incontinence. With this type, urine leaks when pressure (stress) is put on the bladder. This may happen when you cough, sneeze, or laugh. Stress incontinence most often occurs because the pelvic floor muscles that support the bladder and urethra are weak. This can happen after pregnancy and vaginal childbirth or a hysterectomy. It can also be due to excess body weight or hormone changes.  Urge incontinence (also called overactive bladder). With this type, a sudden  urge to urinate is felt often. This may happen even though there may not be much urine in the bladder. The need to urinate often during the night is common. Urge incontinence most often occurs because of bladder spasms. This may be due to bladder irritation or infection. Damage to bladder nerves or pelvic muscles, constipation, and certain medicines can also lead to urge incontinence.  Treatment depends on the cause. Further evaluation is needed to find the type you have. This will likely include an exam and certain tests. Based on the results, you and your healthcare provider can then plan treatment. Until a diagnosis is made, the home care tips below can help ease symptoms.   Home care  Do pelvic floor muscle exercises, if they are prescribed. The pelvic floor muscles help support the bladder and urethra. Many women find that their symptoms improve when doing special exercises that strengthen these muscles. To do the exercises, contract the muscles you would use to stop your stream of urine. But do this when you re not urinating. Hold for 10 seconds, then relax. Repeat 10 to 20 times in a row, at least 3 times a day. Your healthcare provider may give you other instructions for how to do the exercises and how often.  Keep a bladder diary. This helps track how often and how much you urinate over a set period of time. Bring this diary with you to your next visit with the provider. The information can help your provider learn more about your bladder problem.  Lose weight, if advised to by your provider. Extra weight puts pressure on the bladder. Your provider can help you create a weight-loss plan that s right for you. This may include exercising more and making certain diet changes.  Don't have foods and drinks that may irritate the bladder. These can include alcohol and caffeinated drinks.  Quit smoking. Smoking and other tobacco use can lead to a long-term (chronic) cough that strains the pelvic floor muscles.  Smoking may also damage the bladder and urethra. Talk with your provider about treatments or methods you can use to quit smoking.  If drinking large amounts of fluid makes you have symptoms, you may be advised to limit your fluid intake. You may also be advised to drink most of your fluids during the day and to limit fluids at night.  If you re worried about urine leakage or accidents, you may wear absorbent pads to catch urine. Change the pads often. This helps reduce discomfort. It may also reduce the risk of skin or bladder infections.    Follow-up care  Follow up with your healthcare provider, or as directed. It may take some to find the right treatment for your problem. But healthy lifestyle changes can be made right away. These include such things as exercising on a regular basis, eating a healthy diet, losing weight (if needed), and quitting smoking. Your treatment plan may include special therapies or medicines. Certain procedures or surgery may also be options. Talk about any questions you have with your provider.   When to seek medical advice  Call the healthcare provider right away if any of these occur:  Fever of 100.4 F (38 C) or higher, or as directed by your provider  Bladder pain or fullness  Belly swelling  Nausea or vomiting  Back pain  Weakness, dizziness, or fainting  [employer] last reviewed this educational content on 1/1/2020 2000-2022 [employer]. All rights reserved. This information is not intended as a substitute for professional medical care. Always follow your healthcare professional's instructions.        Your Health Risk Assessment indicates you feel you are not in good emotional health.    Recreation   Recreation is not limited to sports and team events. It includes any activity that provides relaxation, interest, enjoyment, and exercise. Recreation provides an outlet for physical, mental, and social energy. It can give a sense of worth and achievement. It can help you  stay healthy.    Mental Exercise and Social Involvement  Mental and emotional health is as important as physical health. Keep in touch with friends and family. Stay as active as possible. Continue to learn and challenge yourself.   Things you can do to stay mentally active are:  Learn something new, like a foreign language or musical instrument.   Play SCRABBLE or do crossword puzzles. If you cannot find people to play these games with you at home, you can play them with others on your computer through the Internet.   Join a games club--anything from card games to chess or checkers or lawn bowling.   Start a new hobby.   Go back to school.   Volunteer.   Read.   Keep up with world events.

## 2023-07-11 DIAGNOSIS — I15.2 ENDOCRINE HYPERTENSION: Primary | ICD-10-CM

## 2023-07-11 DIAGNOSIS — N18.30 CHRONIC KIDNEY DISEASE, STAGE III (MODERATE) (H): ICD-10-CM

## 2023-07-12 ENCOUNTER — MYC MEDICAL ADVICE (OUTPATIENT)
Dept: PEDIATRICS | Facility: CLINIC | Age: 68
End: 2023-07-12
Payer: COMMERCIAL

## 2023-07-12 DIAGNOSIS — E26.9 HYPERALDOSTERONISM (H): ICD-10-CM

## 2023-07-12 DIAGNOSIS — I10 BENIGN ESSENTIAL HYPERTENSION: ICD-10-CM

## 2023-07-13 RX ORDER — SPIRONOLACTONE 50 MG/1
50 TABLET, FILM COATED ORAL DAILY
COMMUNITY
Start: 2023-07-13 | End: 2023-10-16

## 2023-07-13 RX ORDER — HYDROCHLOROTHIAZIDE 12.5 MG/1
25 CAPSULE ORAL DAILY
Qty: 90 CAPSULE | Refills: 4 | COMMUNITY
Start: 2023-07-13 | End: 2024-01-25

## 2023-07-13 NOTE — TELEPHONE ENCOUNTER
Medication list updated.    Marianela Perez MD  Internal Medicine & Pediatrics  [facility] [place]  She/her

## 2023-07-14 ENCOUNTER — ALLIED HEALTH/NURSE VISIT (OUTPATIENT)
Dept: PEDIATRICS | Facility: CLINIC | Age: 68
End: 2023-07-14
Payer: COMMERCIAL

## 2023-07-14 VITALS — SYSTOLIC BLOOD PRESSURE: 108 MMHG | DIASTOLIC BLOOD PRESSURE: 64 MMHG

## 2023-07-14 DIAGNOSIS — I10 BENIGN ESSENTIAL HYPERTENSION: Primary | ICD-10-CM

## 2023-07-14 PROCEDURE — 99207 PR NO CHARGE NURSE ONLY: CPT

## 2023-07-14 NOTE — PROGRESS NOTES
I met with Kayleigh Sousa at the request of Dr Perez to recheck her blood pressure.  Blood pressure medications on the med list were reviewed with patient.    Patient has taken all medications as per usual regimen: Yes  Patient reports tolerating them without any issues or concerns: Yes    Vitals:    07/14/23 1602   BP: 108/64       Blood pressure was taken, previous encounter was reviewed, recorded blood pressure below 140/90.  Patient was discharged and the note will be sent to the provider for final review.

## 2023-07-17 ENCOUNTER — LAB (OUTPATIENT)
Dept: LAB | Facility: CLINIC | Age: 68
End: 2023-07-17
Payer: COMMERCIAL

## 2023-07-17 DIAGNOSIS — I15.2 ENDOCRINE HYPERTENSION: ICD-10-CM

## 2023-07-17 DIAGNOSIS — N18.30 CHRONIC KIDNEY DISEASE, STAGE III (MODERATE) (H): ICD-10-CM

## 2023-07-17 LAB
ANION GAP SERPL CALCULATED.3IONS-SCNC: 12 MMOL/L (ref 7–15)
BUN SERPL-MCNC: 43.2 MG/DL (ref 8–23)
CALCIUM SERPL-MCNC: 9.6 MG/DL (ref 8.8–10.2)
CHLORIDE SERPL-SCNC: 101 MMOL/L (ref 98–107)
CREAT SERPL-MCNC: 1.85 MG/DL (ref 0.51–0.95)
DEPRECATED HCO3 PLAS-SCNC: 23 MMOL/L (ref 22–29)
GFR SERPL CREATININE-BSD FRML MDRD: 29 ML/MIN/1.73M2
GLUCOSE SERPL-MCNC: 92 MG/DL (ref 70–99)
POTASSIUM SERPL-SCNC: 5.3 MMOL/L (ref 3.4–5.3)
SODIUM SERPL-SCNC: 136 MMOL/L (ref 136–145)

## 2023-07-17 PROCEDURE — 80048 BASIC METABOLIC PNL TOTAL CA: CPT

## 2023-07-17 PROCEDURE — 36415 COLL VENOUS BLD VENIPUNCTURE: CPT

## 2023-07-17 NOTE — PROGRESS NOTES
Spectral Edget message sent to patient. Continue current medications.    Marianela Perez MD  Internal Medicine & Pediatrics  [facility]  She/her

## 2023-07-31 ENCOUNTER — MYC MEDICAL ADVICE (OUTPATIENT)
Dept: PEDIATRICS | Facility: CLINIC | Age: 68
End: 2023-07-31
Payer: COMMERCIAL

## 2023-08-01 NOTE — TELEPHONE ENCOUNTER
Patient reports taking Spironolactone 50mg, hydrochlorothiazide 25mg, Lisinopril 40mg.  Recommend stop amlodipine (Norvasc).    Marianela Perez MD  Internal Medicine & Pediatrics  [facility] [place]  She/her

## 2023-08-08 NOTE — PROGRESS NOTES
Completed [facility] paperwork for Kayleigh's daughter, Aminah.  Faxed it, per her request, to her employer and mailed her a copy as well.  Called Aminah to let her know.   71 yo M pmh of HTN, DM presents with possible seizure like activity. Patient resides at [facility]. Staff noted a possible seizure episode. Was in the ED day prior with negative ct and labs. As per staff the patient still not acting at his baseline and sent back to the ED for evaluation. Patient A&Ox2 at [facility]. no fevers or recent illness. no hx of seizures in the past. Patient was admitted for a similar episode in july.     CONSTITUTIONAL: Well-developed; well-nourished; in no acute distress.   SKIN: warm, dry  HEAD: Normocephalic; atraumatic.  EYES: PERRL, EOMI, no conjunctival erythema  ENT: No nasal discharge; airway clear.  NECK: Supple; non tender.  CARD: S1, S2 normal;  Regular rate and rhythm.   RESP: No wheezes, rales or rhonchi.  ABD: soft non tender, non distended, no rebound or guarding  EXT: Normal ROM.  5/5 strength in all 4 extremities   LYMPH: No acute cervical adenopathy.  NEURO: A&Ox2.  neurovascularly intact  PSYCH: Cooperative, appropriate.

## 2023-08-22 ENCOUNTER — MYC MEDICAL ADVICE (OUTPATIENT)
Dept: PEDIATRICS | Facility: CLINIC | Age: 68
End: 2023-08-22
Payer: COMMERCIAL

## 2023-08-22 DIAGNOSIS — I10 BENIGN ESSENTIAL HYPERTENSION: Primary | ICD-10-CM

## 2023-08-22 RX ORDER — AMLODIPINE BESYLATE 5 MG/1
5 TABLET ORAL DAILY
Qty: 90 TABLET | Refills: 0 | Status: SHIPPED | OUTPATIENT
Start: 2023-08-22 | End: 2023-11-21

## 2023-09-05 ENCOUNTER — TRANSFERRED RECORDS (OUTPATIENT)
Dept: HEALTH INFORMATION MANAGEMENT | Facility: CLINIC | Age: 68
End: 2023-09-05
Payer: COMMERCIAL

## 2023-09-25 ENCOUNTER — MYC MEDICAL ADVICE (OUTPATIENT)
Dept: ONCOLOGY | Facility: CLINIC | Age: 68
End: 2023-09-25
Payer: COMMERCIAL

## 2023-09-25 ENCOUNTER — ONCOLOGY VISIT (OUTPATIENT)
Dept: ONCOLOGY | Facility: CLINIC | Age: 68
End: 2023-09-25
Attending: PHYSICIAN ASSISTANT
Payer: COMMERCIAL

## 2023-09-25 VITALS
BODY MASS INDEX: 23.61 KG/M2 | OXYGEN SATURATION: 99 % | HEART RATE: 75 BPM | WEIGHT: 139.7 LBS | RESPIRATION RATE: 16 BRPM | DIASTOLIC BLOOD PRESSURE: 78 MMHG | SYSTOLIC BLOOD PRESSURE: 133 MMHG | TEMPERATURE: 98.1 F

## 2023-09-25 DIAGNOSIS — Z17.0 MALIGNANT NEOPLASM OF UPPER-OUTER QUADRANT OF LEFT BREAST IN FEMALE, ESTROGEN RECEPTOR POSITIVE (H): Primary | ICD-10-CM

## 2023-09-25 DIAGNOSIS — R22.2 NODULE OF CHEST WALL: ICD-10-CM

## 2023-09-25 DIAGNOSIS — C50.412 MALIGNANT NEOPLASM OF UPPER-OUTER QUADRANT OF LEFT BREAST IN FEMALE, ESTROGEN RECEPTOR POSITIVE (H): Primary | ICD-10-CM

## 2023-09-25 PROCEDURE — G0463 HOSPITAL OUTPT CLINIC VISIT: HCPCS | Performed by: PHYSICIAN ASSISTANT

## 2023-09-25 PROCEDURE — 99213 OFFICE O/P EST LOW 20 MIN: CPT | Performed by: PHYSICIAN ASSISTANT

## 2023-09-25 ASSESSMENT — PAIN SCALES - GENERAL: PAINLEVEL: NO PAIN (0)

## 2023-09-25 NOTE — LETTER
9/25/2023         RE: Kayleigh Sousa  [address] [place] [address]        Dear Colleague,    Thank you for referring your patient, Kayleigh Sousa, to the [facility]. Please see a copy of my visit note below.    Oncology/Hematology Visit Note    Sep 25, 2023    Reason for visit: Follow up right DCIS and left-sided breast cancer, add-on for chest concerns    Oncology HPI: Kayleigh Sousa is a 68 year old female with history of left-sided breast cancer and right-sided DCIS.  The DCIS was diagnosed in 2018, s/p lumpectomy and radiation therapy, however she developed left-sided breast cancer in 2019, ER/NC positive, HER2 negative and now s/p bilateral mastectomies. Oncotype returned low risk of recurrence and she attempted hormonal therapy, but discontinued due to intolerance.  She has been under observation.    She is here today as an add-on for chest concerns.    Interval History: Kayleigh is here with her , Leonora.  She checks her chest often and she noticed a small left nodule on the left side just under where her breast would be about 2 months ago.  She did not think anything of it initially, but since it did not go away, she was getting more concerned.  No other skin changes such as swelling, redness, erythema.  She has had some intermittent discomfort in that area, but it could be from previous incisions.  She will have occasional headache recently, but nothing significant.  Recently got new eyeglasses.  No other new complaints.    Review of Systems: See interval hx. Denies fevers, chills, bleeding, bruising,.     PMHx and Social Hx reviewed per EPIC.      Medications:  Current Outpatient Medications   Medication Sig Dispense Refill     amLODIPine (NORVASC) 5 MG tablet Take 1 tablet (5 mg) by mouth daily 90 tablet 0     Ascorbic Acid (VITAMIN C) 500 MG CAPS Take 500 mg by mouth daily       hydrochlorothiazide (MICROZIDE) 12.5 MG capsule Take 2 capsules (25 mg) by mouth  daily 90 capsule 4     lisinopril (ZESTRIL) 40 MG tablet Take 1 tablet (40 mg) by mouth daily 90 tablet 4     spironolactone (ALDACTONE) 50 MG tablet Take 1 tablet (50 mg) by mouth daily       Zinc 30 MG CAPS Take 1 capsule by mouth daily       Ferrous Sulfate (IRON PO) Take 1 tablet by mouth daily 36mg per tablet (Patient not taking: Reported on 9/25/2023)         Allergies   Allergen Reactions     Amoxicillin      Blue Dyes (Parenteral)      Red Dye Headache and Unknown     Tartrazine Headache and Unknown     Yellow Dyes (Non-Tartrazine) Rash         EXAM:    /78 (BP Location: Left arm, Patient Position: Sitting, Cuff Size: Adult Regular)   Pulse 75   Temp 98.1  F (36.7  C) (Oral)   Resp 16   Wt 63.4 kg (139 lb 11.2 oz)   LMP  (LMP Unknown)   SpO2 99%   BMI 23.61 kg/m      GENERAL:  Female, in no acute distress.  Alert and oriented x3.   HEENT:  Normocephalic, atraumatic.   LUNGS: Nonlabored breathing   BREAST: left-sided chest wall was examined.  She does have a very small, mobile nodule over the left anterior rib margin, just under where her left breast would be.  No erythema, swelling or tenderness.  She also has a small, less mobile nodule just medial to the the first nodule.  ABDOMEN: Nondistended  EXTREMITIES:  No edema  SKIN: No rash  PSYCH: Calm and cooperative       Labs: n/a    Imaging: PENDING    Impression/Plan: Kayleigh Sousa is a 68 year old female with right-sided DCIS and left-sided breast cancer (ER/HI positive, HER2 negative) now s/p bilateral mastectomies and radiation therapy.  Did not tolerate hormonal therapy and currently on observation.    Chest concerns: 2 small nodules in the left anterior chest, both over the rib margins, just inferior to where of left breast would be.  Mobile nodules, lateral nodule is more noticeable and very slightly larger.  Plan for ultrasound of the chest wall.    Breast cancer: ER/HI positive, HER2 negative.  Did not tolerate hormonal therapy and  currently on observation.  -Dr. Vargas in December 2020 for        Chart documentation with Dragon Voice recognition Software. Although reviewed after completion, some words and grammatical errors may remain.    20 minutes spent on the date of the encounter doing chart review, review of test results, interpretation of tests, patient visit, documentation, and discussion with family     Paige Ayala PA-C  Hematology/Oncology  [facility]                  Again, thank you for allowing me to participate in the care of your patient.        Sincerely,        Paige Ayala PA-C

## 2023-09-25 NOTE — TELEPHONE ENCOUNTER
Writer attempted to reach Kayleigh to follow-up on her symptomatic MyChart message. Kayleigh reports that she is noticing a small lump under where the left breast would be, close to the breast bone. She first noticed it a few weeks ago and thought it was going away, but unfortunately it is still present. She denies any tenderness at the lump, but does endorse some previous pain in the area. Kayleigh denies any injury or trauma to the area, but is s/p bilateral mastectomies. Kayleigh denies redness, swelling, or other skin alteration in the area.    Kayleigh would be so grateful for further assessment. Writer scheduled Kayleigh with YVETTE Ayala for this afternoon at 2:30pm.    Ana Rodrigez, RN, BSN, OCN  Nurse Care Coordinator  [facility]  P: [phone]     F: [phone]

## 2023-09-25 NOTE — NURSING NOTE
"Oncology Rooming Note    September 25, 2023 2:42 PM   Kayleigh Sousa is a 68 year old female who presents for:    Chief Complaint   Patient presents with    Oncology Clinic Visit     Left sided breast Lump     Initial Vitals: /78 (BP Location: Left arm, Patient Position: Sitting, Cuff Size: Adult Regular)   Pulse 75   Temp 98.1  F (36.7  C) (Oral)   Resp 16   Wt 63.4 kg (139 lb 11.2 oz)   LMP  (LMP Unknown)   SpO2 99%   BMI 23.61 kg/m   Estimated body mass index is 23.61 kg/m  as calculated from the following:    Height as of 6/30/23: 1.638 m (5' 4.5\").    Weight as of this encounter: 63.4 kg (139 lb 11.2 oz). Body surface area is 1.7 meters squared.  No Pain (0) Comment: Data Unavailable   No LMP recorded (lmp unknown). Patient is postmenopausal.  Allergies reviewed: Yes  Medications reviewed: Yes    Medications: Medication refills not needed today.  Pharmacy name entered into Frugalo:    [id number] - [place], [place] - [address] [facility]  [id number] - [place], [place] - [phone]              "

## 2023-09-25 NOTE — PROGRESS NOTES
Oncology/Hematology Visit Note    Sep 25, 2023    Reason for visit: Follow up right DCIS and left-sided breast cancer, add-on for chest concerns    Oncology HPI: Kayleigh Sousa is a 68 year old female with history of left-sided breast cancer and right-sided DCIS.  The DCIS was diagnosed in 2018, s/p lumpectomy and radiation therapy, however she developed left-sided breast cancer in 2019, ER/OR positive, HER2 negative and now s/p bilateral mastectomies. Oncotype returned low risk of recurrence and she attempted hormonal therapy, but discontinued due to intolerance.  She has been under observation.    She is here today as an add-on for chest concerns.    Interval History: Kayleigh is here with her , Leonora.  She checks her chest often and she noticed a small left nodule on the left side just under where her breast would be about 2 months ago.  She did not think anything of it initially, but since it did not go away, she was getting more concerned.  No other skin changes such as swelling, redness, erythema.  She has had some intermittent discomfort in that area, but it could be from previous incisions.  She will have occasional headache recently, but nothing significant.  Recently got new eyeglasses.  No other new complaints.    Review of Systems: See interval hx. Denies fevers, chills, bleeding, bruising,.     PMHx and Social Hx reviewed per EPIC.      Medications:  Current Outpatient Medications   Medication Sig Dispense Refill    amLODIPine (NORVASC) 5 MG tablet Take 1 tablet (5 mg) by mouth daily 90 tablet 0    Ascorbic Acid (VITAMIN C) 500 MG CAPS Take 500 mg by mouth daily      hydrochlorothiazide (MICROZIDE) 12.5 MG capsule Take 2 capsules (25 mg) by mouth daily 90 capsule 4    lisinopril (ZESTRIL) 40 MG tablet Take 1 tablet (40 mg) by mouth daily 90 tablet 4    spironolactone (ALDACTONE) 50 MG tablet Take 1 tablet (50 mg) by mouth daily      Zinc 30 MG CAPS Take 1 capsule by mouth daily      Ferrous Sulfate  (IRON PO) Take 1 tablet by mouth daily 36mg per tablet (Patient not taking: Reported on 9/25/2023)         Allergies   Allergen Reactions    Amoxicillin     Blue Dyes (Parenteral)     Red Dye Headache and Unknown    Tartrazine Headache and Unknown    Yellow Dyes (Non-Tartrazine) Rash         EXAM:    /78 (BP Location: Left arm, Patient Position: Sitting, Cuff Size: Adult Regular)   Pulse 75   Temp 98.1  F (36.7  C) (Oral)   Resp 16   Wt 63.4 kg (139 lb 11.2 oz)   LMP  (LMP Unknown)   SpO2 99%   BMI 23.61 kg/m      GENERAL:  Female, in no acute distress.  Alert and oriented x3.   HEENT:  Normocephalic, atraumatic.   LUNGS: Nonlabored breathing   BREAST: left-sided chest wall was examined.  She does have a very small, mobile nodule over the left anterior rib margin, just under where her left breast would be.  No erythema, swelling or tenderness.  She also has a small, less mobile nodule just medial to the the first nodule.  ABDOMEN: Nondistended  EXTREMITIES:  No edema  SKIN: No rash  PSYCH: Calm and cooperative       Labs: n/a    Imaging: PENDING    Impression/Plan: Kayleigh Sousa is a 68 year old female with right-sided DCIS and left-sided breast cancer (ER/NM positive, HER2 negative) now s/p bilateral mastectomies and radiation therapy.  Did not tolerate hormonal therapy and currently on observation.    Chest concerns: 2 small nodules in the left anterior chest, both over the rib margins, just inferior to where of left breast would be.  Mobile nodules, lateral nodule is more noticeable and very slightly larger.  Plan for ultrasound of the chest wall.    Breast cancer: ER/NM positive, HER2 negative.  Did not tolerate hormonal therapy and currently on observation.  -Dr. Vargas in December 2020 for        Chart documentation with Dragon Voice recognition Software. Although reviewed after completion, some words and grammatical errors may remain.    20 minutes spent on the date of the encounter doing chart  review, review of test results, interpretation of tests, patient visit, documentation, and discussion with family     Paige Ayala PA-C  Hematology/Oncology  [facility]

## 2023-09-28 ENCOUNTER — HOSPITAL ENCOUNTER (OUTPATIENT)
Dept: ULTRASOUND IMAGING | Facility: CLINIC | Age: 68
Discharge: HOME | End: 2023-09-28
Attending: PHYSICIAN ASSISTANT | Admitting: PHYSICIAN ASSISTANT
Payer: COMMERCIAL

## 2023-09-28 DIAGNOSIS — R22.2 NODULE OF CHEST WALL: ICD-10-CM

## 2023-09-28 DIAGNOSIS — C50.412 MALIGNANT NEOPLASM OF UPPER-OUTER QUADRANT OF LEFT BREAST IN FEMALE, ESTROGEN RECEPTOR POSITIVE (H): ICD-10-CM

## 2023-09-28 DIAGNOSIS — Z17.0 MALIGNANT NEOPLASM OF UPPER-OUTER QUADRANT OF LEFT BREAST IN FEMALE, ESTROGEN RECEPTOR POSITIVE (H): ICD-10-CM

## 2023-09-28 PROCEDURE — 76642 ULTRASOUND BREAST LIMITED: CPT | Mod: LT

## 2023-10-16 ENCOUNTER — MYC MEDICAL ADVICE (OUTPATIENT)
Dept: PEDIATRICS | Facility: CLINIC | Age: 68
End: 2023-10-16
Payer: COMMERCIAL

## 2023-10-16 DIAGNOSIS — E26.9 HYPERALDOSTERONISM (H): ICD-10-CM

## 2023-10-16 DIAGNOSIS — I10 BENIGN ESSENTIAL HYPERTENSION: ICD-10-CM

## 2023-10-16 RX ORDER — SPIRONOLACTONE 50 MG/1
50 TABLET, FILM COATED ORAL DAILY
Qty: 90 TABLET | Refills: 4 | Status: SHIPPED | OUTPATIENT
Start: 2023-10-16

## 2023-10-16 NOTE — TELEPHONE ENCOUNTER
Prescription signed. State message sent to patient.    Marianela Perez MD  Internal Medicine & Pediatrics  [facility]  She/her

## 2023-11-21 ENCOUNTER — DOCUMENTATION ONLY (OUTPATIENT)
Dept: LAB | Facility: CLINIC | Age: 68
End: 2023-11-21

## 2023-11-21 DIAGNOSIS — I10 BENIGN ESSENTIAL HYPERTENSION: ICD-10-CM

## 2023-11-21 RX ORDER — AMLODIPINE BESYLATE 5 MG/1
5 TABLET ORAL DAILY
Qty: 90 TABLET | Refills: 1 | Status: SHIPPED | OUTPATIENT
Start: 2023-11-21 | End: 2024-05-28

## 2023-11-21 NOTE — PROGRESS NOTES
Kayleigh HELENA McclendonLars has an upcoming lab appointment:    Future Appointments   Date Time Provider Department Center   12/18/2023  9:15 AM [facility]   12/20/2023  9:00 AM Victor M Vargas MD [facility]     Patient is scheduled for the following lab(s):   Scheduling Notes: labs ; danielle   Made On: 6/22/2023 1:50 PM By: EMERSON HANEY       There is no order available. Please review and place either future orders or HMPO (Review of Health Maintenance Protocol Orders), as appropriate.    Health Maintenance Due   Topic    ANNUAL REVIEW OF HM ORDERS      Frederic Mccain

## 2024-01-23 ENCOUNTER — PATIENT OUTREACH (OUTPATIENT)
Dept: GASTROENTEROLOGY | Facility: CLINIC | Age: 69
End: 2024-01-23
Payer: COMMERCIAL

## 2024-01-25 DIAGNOSIS — I10 BENIGN ESSENTIAL HYPERTENSION: ICD-10-CM

## 2024-01-26 RX ORDER — HYDROCHLOROTHIAZIDE 12.5 MG/1
25 CAPSULE ORAL DAILY
Qty: 90 CAPSULE | Refills: 4 | Status: SHIPPED | OUTPATIENT
Start: 2024-01-26 | End: 2024-08-21

## 2024-02-22 ENCOUNTER — MYC MEDICAL ADVICE (OUTPATIENT)
Dept: PEDIATRICS | Facility: CLINIC | Age: 69
End: 2024-02-22
Payer: COMMERCIAL

## 2024-02-22 DIAGNOSIS — E87.6 HYPOKALEMIA: Primary | ICD-10-CM

## 2024-02-22 NOTE — TELEPHONE ENCOUNTER
Labs ordered and HMPO order signed.    Marianela Perez MD  Internal Medicine & Pediatrics  [facility]  She/her

## 2024-02-26 ENCOUNTER — LAB (OUTPATIENT)
Dept: LAB | Facility: CLINIC | Age: 69
End: 2024-02-26
Payer: COMMERCIAL

## 2024-02-26 DIAGNOSIS — E87.6 HYPOKALEMIA: ICD-10-CM

## 2024-02-26 LAB
ALBUMIN SERPL BCG-MCNC: 4.7 G/DL (ref 3.5–5.2)
ALP SERPL-CCNC: 111 U/L (ref 40–150)
ALT SERPL W P-5'-P-CCNC: 17 U/L (ref 0–50)
ANION GAP SERPL CALCULATED.3IONS-SCNC: 10 MMOL/L (ref 7–15)
AST SERPL W P-5'-P-CCNC: 27 U/L (ref 0–45)
BILIRUB SERPL-MCNC: 0.6 MG/DL
BUN SERPL-MCNC: 28.6 MG/DL (ref 8–23)
CALCIUM SERPL-MCNC: 10 MG/DL (ref 8.8–10.2)
CHLORIDE SERPL-SCNC: 102 MMOL/L (ref 98–107)
CREAT SERPL-MCNC: 1.49 MG/DL (ref 0.51–0.95)
DEPRECATED HCO3 PLAS-SCNC: 28 MMOL/L (ref 22–29)
EGFRCR SERPLBLD CKD-EPI 2021: 38 ML/MIN/1.73M2
GLUCOSE SERPL-MCNC: 105 MG/DL (ref 70–99)
POTASSIUM SERPL-SCNC: 4.2 MMOL/L (ref 3.4–5.3)
PROT SERPL-MCNC: 7.9 G/DL (ref 6.4–8.3)
SODIUM SERPL-SCNC: 140 MMOL/L (ref 135–145)

## 2024-02-26 PROCEDURE — 80053 COMPREHEN METABOLIC PANEL: CPT

## 2024-02-26 PROCEDURE — 36415 COLL VENOUS BLD VENIPUNCTURE: CPT

## 2024-04-29 ENCOUNTER — TRANSFERRED RECORDS (OUTPATIENT)
Dept: HEALTH INFORMATION MANAGEMENT | Facility: CLINIC | Age: 69
End: 2024-04-29
Payer: COMMERCIAL

## 2024-05-01 ENCOUNTER — OFFICE VISIT (OUTPATIENT)
Dept: OPTOMETRY | Facility: CLINIC | Age: 69
End: 2024-05-01
Payer: COMMERCIAL

## 2024-05-01 DIAGNOSIS — H52.4 PRESBYOPIA: Primary | ICD-10-CM

## 2024-05-01 DIAGNOSIS — H52.13 MYOPIA OF BOTH EYES WITH ASTIGMATISM: ICD-10-CM

## 2024-05-01 DIAGNOSIS — H26.9 BILATERAL INCIPIENT CATARACTS: ICD-10-CM

## 2024-05-01 DIAGNOSIS — H52.203 MYOPIA OF BOTH EYES WITH ASTIGMATISM: ICD-10-CM

## 2024-05-01 PROCEDURE — 92015 DETERMINE REFRACTIVE STATE: CPT | Performed by: OPTOMETRIST

## 2024-05-01 PROCEDURE — 92014 COMPRE OPH EXAM EST PT 1/>: CPT | Performed by: OPTOMETRIST

## 2024-05-01 ASSESSMENT — REFRACTION_WEARINGRX
OS_SPHERE: -3.25
SPECS_TYPE: PAL
OS_ADD: +2.50
OD_CYLINDER: +0.25
OD_SPHERE: -3.25
OS_AXIS: 067
OD_ADD: +2.50
OS_CYLINDER: +0.75
OD_AXIS: 174

## 2024-05-01 ASSESSMENT — VISUAL ACUITY
OS_CC: 20/20
OD_CC: 20/20
OD_CC+: -1
OD_CC: 20/30
OS_CC: 20/30-1
CORRECTION_TYPE: GLASSES
METHOD: SNELLEN - LINEAR
OD_SC: 20/80
OS_SC: 20/80

## 2024-05-01 ASSESSMENT — REFRACTION_MANIFEST
OS_CYLINDER: +0.25
OS_ADD: +2.75
OD_SPHERE: -3.25
OS_CYLINDER: +0.50
OD_AXIS: 093
OD_AXIS: 162
OD_SPHERE: -2.00
METHOD_AUTOREFRACTION: 1
OD_ADD: +2.75
OS_SPHERE: -3.00
OS_SPHERE: -2.75
OS_AXIS: 062
OS_AXIS: 040
OD_CYLINDER: +1.00
OD_CYLINDER: +0.25

## 2024-05-01 ASSESSMENT — CONF VISUAL FIELD
OS_INFERIOR_TEMPORAL_RESTRICTION: 0
OS_SUPERIOR_NASAL_RESTRICTION: 0
OD_INFERIOR_TEMPORAL_RESTRICTION: 0
METHOD: COUNTING FINGERS
OS_INFERIOR_NASAL_RESTRICTION: 0
OD_SUPERIOR_TEMPORAL_RESTRICTION: 0
OS_NORMAL: 1
OD_INFERIOR_NASAL_RESTRICTION: 0
OD_NORMAL: 1
OD_SUPERIOR_NASAL_RESTRICTION: 0
OS_SUPERIOR_TEMPORAL_RESTRICTION: 0

## 2024-05-01 ASSESSMENT — SLIT LAMP EXAM - LIDS
COMMENTS: NORMAL
COMMENTS: NORMAL

## 2024-05-01 ASSESSMENT — CUP TO DISC RATIO
OS_RATIO: 0.45
OD_RATIO: 0.5

## 2024-05-01 ASSESSMENT — TONOMETRY
OS_IOP_MMHG: 17
OD_IOP_MMHG: 16
IOP_METHOD: APPLANATION

## 2024-05-01 ASSESSMENT — EXTERNAL EXAM - RIGHT EYE: OD_EXAM: NORMAL

## 2024-05-01 ASSESSMENT — EXTERNAL EXAM - LEFT EYE: OS_EXAM: NORMAL

## 2024-05-01 NOTE — LETTER
5/1/2024         RE: Kayleigh Sousa  [address] [place] [address]        Dear Colleague,    Thank you for referring your patient, Kayleigh Sousa, to the [facility] [place]. Please see a copy of my visit note below.    Chief Complaint   Patient presents with     Annual Eye Exam        Last Eye Exam: 2 years ago   Dilated Previously: Yes, side effects of dilation explained today    What are you currently using to see?  glasses       Distance Vision Acuity: Satisfied with vision with glasses     Near Vision Acuity: Satisfied with vision while reading and using computer with glasses    Eye Comfort: good  Do you use eye drops? : No      Paige Cobos - Optometric Assistant           Medical, surgical and family histories reviewed and updated 5/1/2024.     Glaucoma suspect in the past, low risk without cupping changes for a decade and stable IOP  Zoster dermatitis R  OBJECTIVE: See Ophthalmology exam    ASSESSMENT:    ICD-10-CM    1. Presbyopia  H52.4       2. Myopia of both eyes with astigmatism  H52.13     H52.203       3. Bilateral incipient cataracts  H26.9           PLAN:   No change needed   Sissy Lerma OD     Again, thank you for allowing me to participate in the care of your patient.        Sincerely,        Sissy Lerma, OD

## 2024-05-01 NOTE — PROGRESS NOTES
Chief Complaint   Patient presents with    Annual Eye Exam        Last Eye Exam: 2 years ago   Dilated Previously: Yes, side effects of dilation explained today    What are you currently using to see?  glasses       Distance Vision Acuity: Satisfied with vision with glasses     Near Vision Acuity: Satisfied with vision while reading and using computer with glasses    Eye Comfort: good  Do you use eye drops? : No      Paige Cobos - Optometric Assistant           Medical, surgical and family histories reviewed and updated 5/1/2024.     Glaucoma suspect in the past, low risk without cupping changes for a decade and stable IOP  Zoster dermatitis R  OBJECTIVE: See Ophthalmology exam    ASSESSMENT:    ICD-10-CM    1. Presbyopia  H52.4       2. Myopia of both eyes with astigmatism  H52.13     H52.203       3. Bilateral incipient cataracts  H26.9           PLAN:   No change needed   Sissy Lerma OD

## 2024-05-27 ENCOUNTER — MYC MEDICAL ADVICE (OUTPATIENT)
Dept: PEDIATRICS | Facility: CLINIC | Age: 69
End: 2024-05-27
Payer: COMMERCIAL

## 2024-05-27 DIAGNOSIS — I10 BENIGN ESSENTIAL HYPERTENSION: ICD-10-CM

## 2024-05-28 RX ORDER — AMLODIPINE BESYLATE 5 MG/1
5 TABLET ORAL DAILY
Qty: 90 TABLET | Refills: 1 | Status: SHIPPED | OUTPATIENT
Start: 2024-05-28

## 2024-07-02 ENCOUNTER — INFUSION THERAPY VISIT (OUTPATIENT)
Dept: INFUSION THERAPY | Facility: CLINIC | Age: 69
End: 2024-07-02
Attending: INTERNAL MEDICINE
Payer: COMMERCIAL

## 2024-07-02 ENCOUNTER — ONCOLOGY VISIT (OUTPATIENT)
Dept: ONCOLOGY | Facility: CLINIC | Age: 69
End: 2024-07-02
Attending: INTERNAL MEDICINE
Payer: COMMERCIAL

## 2024-07-02 VITALS
DIASTOLIC BLOOD PRESSURE: 72 MMHG | SYSTOLIC BLOOD PRESSURE: 116 MMHG | HEIGHT: 63 IN | HEART RATE: 65 BPM | RESPIRATION RATE: 16 BRPM | TEMPERATURE: 97.5 F | WEIGHT: 141.6 LBS | OXYGEN SATURATION: 96 % | BODY MASS INDEX: 25.09 KG/M2

## 2024-07-02 DIAGNOSIS — C50.412 MALIGNANT NEOPLASM OF UPPER-OUTER QUADRANT OF LEFT BREAST IN FEMALE, ESTROGEN RECEPTOR POSITIVE (H): ICD-10-CM

## 2024-07-02 DIAGNOSIS — C50.412 MALIGNANT NEOPLASM OF UPPER-OUTER QUADRANT OF LEFT BREAST IN FEMALE, ESTROGEN RECEPTOR POSITIVE (H): Primary | ICD-10-CM

## 2024-07-02 DIAGNOSIS — M81.0 AGE-RELATED OSTEOPOROSIS WITHOUT CURRENT PATHOLOGICAL FRACTURE: ICD-10-CM

## 2024-07-02 DIAGNOSIS — Z17.0 MALIGNANT NEOPLASM OF UPPER-OUTER QUADRANT OF LEFT BREAST IN FEMALE, ESTROGEN RECEPTOR POSITIVE (H): ICD-10-CM

## 2024-07-02 DIAGNOSIS — Z17.0 MALIGNANT NEOPLASM OF UPPER-OUTER QUADRANT OF LEFT BREAST IN FEMALE, ESTROGEN RECEPTOR POSITIVE (H): Primary | ICD-10-CM

## 2024-07-02 LAB — VIT D+METAB SERPL-MCNC: 42 NG/ML (ref 20–50)

## 2024-07-02 PROCEDURE — 86300 IMMUNOASSAY TUMOR CA 15-3: CPT | Performed by: INTERNAL MEDICINE

## 2024-07-02 PROCEDURE — 82306 VITAMIN D 25 HYDROXY: CPT | Performed by: INTERNAL MEDICINE

## 2024-07-02 PROCEDURE — 99214 OFFICE O/P EST MOD 30 MIN: CPT | Performed by: INTERNAL MEDICINE

## 2024-07-02 PROCEDURE — G0463 HOSPITAL OUTPT CLINIC VISIT: HCPCS | Performed by: INTERNAL MEDICINE

## 2024-07-02 PROCEDURE — 36415 COLL VENOUS BLD VENIPUNCTURE: CPT

## 2024-07-02 ASSESSMENT — PAIN SCALES - GENERAL: PAINLEVEL: NO PAIN (0)

## 2024-07-02 NOTE — PROGRESS NOTES
Kayleigh Sousa is a 68-year-old patient who comes in today for interim followup.     CHIEF COMPLAINT:  1.  History of right-sided DCIS diagnosed in 2018, status post lumpectomy and radiation therapy.  2.  Left-sided breast cancer diagnosed in 2019.  Stage of disease was a T2 N1 M0, ER/ND positive, HER-2 receptor negative tumor.  3.  Now status post bilateral mastectomies.  4.  Oncotype in the low risk of recurrence range  5. Stage 3 kidney disease followed by Dr Mccann   6. Hx hypertension         HISTORY OF PRESENT ILLNESS:        Kayleigh is a 68-year-old postmenopausal patient with a history of bilateral breast cancer as outlined above.  The most recent breast cancer was on the left side, it was a T2 N1 tumor, ER/ND positive, HER-2 receptor negative, and she had an Oncotype, which came back with a recurrence score of 9.  She did post-mastectomy radiation therapy and then started on hormonal treatment, but got quite a bit of side effects from aromatase inhibitors, so now she elected to do close observation.  She has no major complaints coming in today.  She denies cough, shortness of breath, bone pain, or any worrisome symptomatology.  She does also suffer from some renal dysfunction and follows with nephrology for that.     FAMILY HISTORY:  First cousin and paternal cousin with breast cancer.      Comprehensive review of systems:  Patient denies cough shortness of breath bone pain or any worrisome symptomatology from my standpoint.  She would like to get her breast cancer marker checked today and also vitamin D level and I have ordered those today     PAST MEDICAL HISTORY:  History of hyperaldosteronism, history of renal insufficiency, history of hypertension, history of bilateral breast cancer as outlined above.        SOCIAL HISTORY:  The patient is .  She has 2 children in their 40s.     PAIN ASSESSMENT:  The patient denies pain today.     PHYSICAL EXAMINATION:    GENERAL:  She is a well-appearing lady, in  no acute distress.  VITAL SIGNS:  Stable  NECK:  Has no masses or goiter.  CHEST:  Clear to auscultation and percussion bilaterally.  HEART:  Sounds 1 and 2 normal, no added sounds, no murmurs.   BREASTS:  The patient is status post bilateral mastectomies.  The scars look good.  Right and left axillae are negative.  GASTROINTESTINAL:  Abdomen is soft and nontender.  No hepatosplenomegaly.  EXTREMITIES:  Legs without tenderness or edema.  NEUROLOGIC:  Peripheral neurological exam grossly intact.  SKIN:  Has no rashes or lesions.        Data review:      I reviewed her last labs that were done on 2/26/2024.    I have ordered today a vitamin D level as well as a breast cancer marker        Impression and plan:      This is a 68-year-old patient who has a history of bilateral breast disease.  She has got right-sided DCIS which was diagnosed in 2018 and she is now 6 years out from that.  Left-sided breast cancer was diagnosed in 2019 and so she is coming up on 5 years out from that.    I discussed with her today that because her Oncotype was in the low risk of recurrence range as well as being 5 years out from her diagnosis on the left-sided breast cancer she could go on follow-up as needed protocol from here on in.    She is in agreement with the plan    She will be seen on an as-needed basis      I have ordered today a breast cancer marker and a vitamin D level I will be in touch with her with the results.        Total time spent 30 minutes      Victor M santos MD

## 2024-07-02 NOTE — NURSING NOTE
"Oncology Rooming Note    July 2, 2024 10:32 AM   Kayleigh Sousa is a 68 year old female who presents for:    Chief Complaint   Patient presents with    Oncology Clinic Visit     Invasive ductal carcinoma of breast, female, left        Initial Vitals: /72   Pulse 65   Temp 97.5  F (36.4  C) (Temporal)   Resp 16   Ht 1.588 m (5' 2.5\")   Wt 64.2 kg (141 lb 9.6 oz)   LMP  (LMP Unknown)   SpO2 96%   BMI 25.49 kg/m   Estimated body mass index is 25.49 kg/m  as calculated from the following:    Height as of this encounter: 1.588 m (5' 2.5\").    Weight as of this encounter: 64.2 kg (141 lb 9.6 oz). Body surface area is 1.68 meters squared.  No Pain (0) Comment: Data Unavailable   No LMP recorded (lmp unknown). Patient is postmenopausal.  Allergies reviewed: Yes  Medications reviewed: Yes    Medications: Medication refills not needed today.  Pharmacy name entered into [employer]:    [id number] - [place] - [address] [facility]  [id number] - [place], [place] - [address]    Frailty Screening:   Is the patient here for a new oncology consult visit in cancer care? 2. No      Clinical concerns: 1 year follow up       Jerilyn Foster MA              "

## 2024-07-02 NOTE — LETTER
7/2/2024      Kayleigh Sousa  [address] [place] [address]      Dear Colleague,    Thank you for referring your patient, Kayleigh Sousa, to the [facility]. Please see a copy of my visit note below.    Kayleigh Sousa is a 68-year-old patient who comes in today for interim followup.     CHIEF COMPLAINT:  1.  History of right-sided DCIS diagnosed in 2018, status post lumpectomy and radiation therapy.  2.  Left-sided breast cancer diagnosed in 2019.  Stage of disease was a T2 N1 M0, ER/VT positive, HER-2 receptor negative tumor.  3.  Now status post bilateral mastectomies.  4.  Oncotype in the low risk of recurrence range  5. Stage 3 kidney disease followed by Dr Mccann   6. Hx hypertension         HISTORY OF PRESENT ILLNESS:        Kayleigh is a 68-year-old postmenopausal patient with a history of bilateral breast cancer as outlined above.  The most recent breast cancer was on the left side, it was a T2 N1 tumor, ER/VT positive, HER-2 receptor negative, and she had an Oncotype, which came back with a recurrence score of 9.  She did post-mastectomy radiation therapy and then started on hormonal treatment, but got quite a bit of side effects from aromatase inhibitors, so now she elected to do close observation.  She has no major complaints coming in today.  She denies cough, shortness of breath, bone pain, or any worrisome symptomatology.  She does also suffer from some renal dysfunction and follows with nephrology for that.     FAMILY HISTORY:  First cousin and paternal cousin with breast cancer.      Comprehensive review of systems:  Patient denies cough shortness of breath bone pain or any worrisome symptomatology from my standpoint.  She would like to get her breast cancer marker checked today and also vitamin D level and I have ordered those today     PAST MEDICAL HISTORY:  History of hyperaldosteronism, history of renal insufficiency, history of hypertension, history of bilateral  breast cancer as outlined above.        SOCIAL HISTORY:  The patient is .  She has 2 children in their 40s.     PAIN ASSESSMENT:  The patient denies pain today.     PHYSICAL EXAMINATION:    GENERAL:  She is a well-appearing lady, in no acute distress.  VITAL SIGNS:  Stable  NECK:  Has no masses or goiter.  CHEST:  Clear to auscultation and percussion bilaterally.  HEART:  Sounds 1 and 2 normal, no added sounds, no murmurs.   BREASTS:  The patient is status post bilateral mastectomies.  The scars look good.  Right and left axillae are negative.  GASTROINTESTINAL:  Abdomen is soft and nontender.  No hepatosplenomegaly.  EXTREMITIES:  Legs without tenderness or edema.  NEUROLOGIC:  Peripheral neurological exam grossly intact.  SKIN:  Has no rashes or lesions.        Data review:      I reviewed her last labs that were done on 2/26/2024.    I have ordered today a vitamin D level as well as a breast cancer marker        Impression and plan:      This is a 68-year-old patient who has a history of bilateral breast disease.  She has got right-sided DCIS which was diagnosed in 2018 and she is now 6 years out from that.  Left-sided breast cancer was diagnosed in 2019 and so she is coming up on 5 years out from that.    I discussed with her today that because her Oncotype was in the low risk of recurrence range as well as being 5 years out from her diagnosis on the left-sided breast cancer she could go on follow-up as needed protocol from here on in.    She is in agreement with the plan    She will be seen on an as-needed basis      I have ordered today a breast cancer marker and a vitamin D level I will be in touch with her with the results.        Total time spent 30 minutes      Victor M vargas MD       Again, thank you for allowing me to participate in the care of your patient.        Sincerely,        Victor M Vargas MD

## 2024-07-02 NOTE — PROGRESS NOTES
Nursing Note:  Kayleigh Sousa presents today for labs.    Patient seen by provider today: Yes: Dr. Vargas   present during visit today: Not Applicable.    Note: N/A.    Intravenous Access:  Lab draw site RAC, Needle type butterfly, Gauge 23.  Labs drawn without difficulty.    Discharge Plan:   Patient was discharged to home in stable condition.     Brooklyn Oh RN

## 2024-07-04 LAB — CANCER AG27-29 SERPL-ACNC: 19.6 U/ML

## 2024-07-08 ENCOUNTER — OFFICE VISIT (OUTPATIENT)
Dept: PEDIATRICS | Facility: CLINIC | Age: 69
End: 2024-07-08
Attending: STUDENT IN AN ORGANIZED HEALTH CARE EDUCATION/TRAINING PROGRAM
Payer: COMMERCIAL

## 2024-07-08 VITALS
WEIGHT: 141 LBS | HEART RATE: 64 BPM | BODY MASS INDEX: 25.95 KG/M2 | HEIGHT: 62 IN | DIASTOLIC BLOOD PRESSURE: 72 MMHG | OXYGEN SATURATION: 99 % | TEMPERATURE: 97.4 F | RESPIRATION RATE: 16 BRPM | SYSTOLIC BLOOD PRESSURE: 121 MMHG

## 2024-07-08 DIAGNOSIS — I10 BENIGN ESSENTIAL HYPERTENSION: ICD-10-CM

## 2024-07-08 DIAGNOSIS — N18.32 STAGE 3B CHRONIC KIDNEY DISEASE (H): ICD-10-CM

## 2024-07-08 DIAGNOSIS — Z00.00 ENCOUNTER FOR MEDICARE ANNUAL WELLNESS EXAM: Primary | ICD-10-CM

## 2024-07-08 DIAGNOSIS — E26.9 HYPERALDOSTERONISM (H): ICD-10-CM

## 2024-07-08 LAB
ANION GAP SERPL CALCULATED.3IONS-SCNC: 10 MMOL/L (ref 7–15)
BUN SERPL-MCNC: 16.2 MG/DL (ref 8–23)
CALCIUM SERPL-MCNC: 9.9 MG/DL (ref 8.8–10.2)
CHLORIDE SERPL-SCNC: 105 MMOL/L (ref 98–107)
CREAT SERPL-MCNC: 1.37 MG/DL (ref 0.51–0.95)
DEPRECATED HCO3 PLAS-SCNC: 28 MMOL/L (ref 22–29)
EGFRCR SERPLBLD CKD-EPI 2021: 42 ML/MIN/1.73M2
GLUCOSE SERPL-MCNC: 108 MG/DL (ref 70–99)
POTASSIUM SERPL-SCNC: 4.5 MMOL/L (ref 3.4–5.3)
SODIUM SERPL-SCNC: 143 MMOL/L (ref 135–145)
URATE SERPL-MCNC: 6.9 MG/DL (ref 2.4–5.7)

## 2024-07-08 PROCEDURE — 84550 ASSAY OF BLOOD/URIC ACID: CPT | Performed by: STUDENT IN AN ORGANIZED HEALTH CARE EDUCATION/TRAINING PROGRAM

## 2024-07-08 PROCEDURE — G0439 PPPS, SUBSEQ VISIT: HCPCS | Performed by: STUDENT IN AN ORGANIZED HEALTH CARE EDUCATION/TRAINING PROGRAM

## 2024-07-08 PROCEDURE — 80048 BASIC METABOLIC PNL TOTAL CA: CPT | Performed by: STUDENT IN AN ORGANIZED HEALTH CARE EDUCATION/TRAINING PROGRAM

## 2024-07-08 PROCEDURE — 36415 COLL VENOUS BLD VENIPUNCTURE: CPT | Performed by: STUDENT IN AN ORGANIZED HEALTH CARE EDUCATION/TRAINING PROGRAM

## 2024-07-08 RX ORDER — LISINOPRIL 40 MG/1
40 TABLET ORAL DAILY
Qty: 90 TABLET | Refills: 4 | Status: SHIPPED | OUTPATIENT
Start: 2024-07-08

## 2024-07-08 SDOH — HEALTH STABILITY: PHYSICAL HEALTH: ON AVERAGE, HOW MANY DAYS PER WEEK DO YOU ENGAGE IN MODERATE TO STRENUOUS EXERCISE (LIKE A BRISK WALK)?: 5 DAYS

## 2024-07-08 SDOH — HEALTH STABILITY: PHYSICAL HEALTH: ON AVERAGE, HOW MANY MINUTES DO YOU ENGAGE IN EXERCISE AT THIS LEVEL?: 30 MIN

## 2024-07-08 ASSESSMENT — PAIN SCALES - GENERAL: PAINLEVEL: NO PAIN (0)

## 2024-07-08 ASSESSMENT — SOCIAL DETERMINANTS OF HEALTH (SDOH): HOW OFTEN DO YOU GET TOGETHER WITH FRIENDS OR RELATIVES?: THREE TIMES A WEEK

## 2024-07-08 NOTE — PROGRESS NOTES
"Preventive Care Visit  [facility] Deirdre E. Milligan, MD, Internal Medicine - Pediatrics  Jul 8, 2024      Assessment & Plan     Encounter for Medicare annual wellness exam  Had elevated uric acid with nephrologist visit and clinical history consistent with gout. Not currently on urate lowering therapy.  - Uric acid; Future  - Uric acid    Stage 3b chronic kidney disease (H)  Follows with nephrology.  - Basic metabolic panel  (Ca, Cl, CO2, Creat, Gluc, K, Na, BUN); Future  - Basic metabolic panel  (Ca, Cl, CO2, Creat, Gluc, K, Na, BUN)    Hyperaldosteronism (H24)  Follows with nephrology.    Benign essential hypertension  Blood pressure well controlled. Recent ED (emergency department) visit may have been symptomatic hyponatremia. Last year had decreased hydrochlorothiazide to 12.5mg but patient has been taking 25mg daily. Patient reports lower extremity edema that is improved with diuretic use.  - lisinopril (ZESTRIL) 40 MG tablet; Take 1 tablet (40 mg) by mouth daily            BMI  Estimated body mass index is 25.62 kg/m  as calculated from the following:    Height as of this encounter: 1.58 m (5' 2.21\").    Weight as of this encounter: 64 kg (141 lb).       Counseling  Appropriate preventive services were discussed with this patient, including applicable screening as appropriate for fall prevention, nutrition, physical activity, Tobacco-use cessation, weight loss and cognition.  Checklist reviewing preventive services available has been given to the patient.  Reviewed patient's diet, addressing concerns and/or questions.   Discussed possible causes of fatigue. Information on urinary incontinence and treatment options given to patient.           Galen Victor is a 68 year old, presenting for the following:  Annual Visit        7/8/2024    10:04 AM   Additional Questions   Roomed by [facility]   Accompanied by self         Health Care Directive  Patient does not have a Health Care Directive or " Living Will: Discussed advance care planning with patient; information given to patient to review.    HPI    No longer following with oncology for breast cancer                  7/8/2024   General Health   How would you rate your overall physical health? Good   Feel stress (tense, anxious, or unable to sleep) Rather much      (!) STRESS CONCERN      7/8/2024   Nutrition   Diet: Gluten-free/reduced            7/8/2024   Exercise   Days per week of moderate/strenous exercise 5 days   Average minutes spent exercising at this level 30 min            7/8/2024   Social Factors   Frequency of gathering with friends or relatives Three times a week   Worry food won't last until get money to buy more No   Food not last or not have enough money for food? No   Do you have housing? (Housing is defined as stable permanent housing and does not include staying ouside in a car, in a tent, in an abandoned building, in an overnight shelter, or couch-surfing.) Yes   Are you worried about losing your housing? No   Lack of transportation? No   Unable to get utilities (heat,electricity)? No            7/8/2024   Fall Risk   Fallen 2 or more times in the past year? No    No   Trouble with walking or balance? No    No       Multiple values from one day are sorted in reverse-chronological order          7/8/2024   Activities of Daily Living- Home Safety   Needs help with the following daily activites None of the above   Safety concerns in the home None of the above            7/8/2024   Dental   Dentist two times every year? Yes            7/8/2024   Hearing Screening   Hearing concerns? None of the above            7/8/2024   Driving Risk Screening   Patient/family members have concerns about driving No            7/8/2024   General Alertness/Fatigue Screening   Have you been more tired than usual lately? (!) YES            7/8/2024   Urinary Incontinence Screening   Bothered by leaking urine in past 6 months Yes            7/8/2024   TB  Screening   Were you born outside of the [place]? No            Today's PHQ-2 Score:       7/8/2024     7:56 AM   PHQ-2 ( 1999 [employer])   Q1: Little interest or pleasure in doing things 0   Q2: Feeling down, depressed or hopeless 0   PHQ-2 Score 0   Q1: Little interest or pleasure in doing things Not at all   Q2: Feeling down, depressed or hopeless Not at all   PHQ-2 Score 0           7/8/2024   Substance Use   Alcohol more than 3/day or more than 7/wk No   Do you have a current opioid prescription? No   How severe/bad is pain from 1 to 10? 0/10 (No Pain)   Do you use any other substances recreationally? No        Social History     Tobacco Use    Smoking status: Never     Passive exposure: Never    Smokeless tobacco: Never   Vaping Use    Vaping status: Never Used   Substance Use Topics    Alcohol use: Not Currently    Drug use: Never                History of abnormal Pap smear: No - age 65 or older with adequate negative prior screening test results (3 consecutive negative cytology results, 2 consecutive negative cotesting results, or 2 consecutive negative HrHPV test results within 10 years, with the most recent test occurring within the recommended screening interval for the test used)       ASCVD Risk   The 10-year ASCVD risk score (Benji MALDONADO, et al., 2019) is: 7.9%    Values used to calculate the score:      Age: 68 years      Sex: Female      Is Non- : No      Diabetic: No      Tobacco smoker: No      Systolic Blood Pressure: 121 mmHg      Is BP treated: Yes      HDL Cholesterol: 67 mg/dL      Total Cholesterol: 155 mg/dL            Reviewed and updated as needed this visit by Provider                      Current providers sharing in care for this patient include:  Patient Care Team:  Milligan, Deirdre E, MD as PCP - General (Internal Medicine - Pediatrics)  Yanna Thompson MD as MD (Hematology & Oncology)  Jere Brown MD as MD (Hematology & Oncology)  Shannan Cline MD as  "Hospitalist (Endocrinology, Diabetes, and Metabolism)  Shannan Cline MD as Assigned Endocrinology Provider  Milligan, Deirdre E, MD as Assigned PCP  Ana Rodrigez, RN as Specialty Care Coordinator (Hematology & Oncology)  Sissy Lerma OD as MD (Ophthalmology)  Sissy Lerma OD as Assigned Surgical Provider  Paige Torres PA-C as Assigned Cancer Care Provider    The following health maintenance items are reviewed in Epic and correct as of today:  Health Maintenance   Topic Date Due    RSV VACCINE (Pregnancy & 60+) (1 - 1-dose 60+ series) Never done    ZOSTER IMMUNIZATION (2 of 3) 08/14/2017    COVID-19 Vaccine (6 - 2023-24 season) 09/01/2023    MICROALBUMIN  12/30/2023    LIPID  06/30/2024    HEMOGLOBIN  05/30/2024    MEDICARE ANNUAL WELLNESS VISIT  06/30/2024    INFLUENZA VACCINE (1) 09/01/2024    ANNUAL REVIEW OF HM ORDERS  02/22/2025    BMP  02/26/2025    FALL RISK ASSESSMENT  07/08/2025    COLORECTAL CANCER SCREENING  07/13/2025    DTAP/TDAP/TD IMMUNIZATION (3 - Td or Tdap) 05/11/2026    GLUCOSE  02/26/2027    ADVANCE CARE PLANNING  06/30/2028    DEXA  12/17/2034    PARATHYROID  Completed    PHOSPHORUS  Completed    HEPATITIS C SCREENING  Completed    PHQ-2 (once per calendar year)  Completed    URINALYSIS  Completed    ALK PHOS  Completed    Pneumococcal Vaccine: 65+ Years  Addressed    IPV IMMUNIZATION  Aged Out    HPV IMMUNIZATION  Aged Out    MENINGITIS IMMUNIZATION  Aged Out    RSV MONOCLONAL ANTIBODY  Aged Out    MAMMO SCREENING  Discontinued            Objective    Exam  /72 (BP Location: Right arm, Patient Position: Sitting, Cuff Size: Adult Regular)   Pulse 64   Temp 97.4  F (36.3  C) (Temporal)   Resp 16   Ht 1.58 m (5' 2.21\")   Wt 64 kg (141 lb)   LMP  (LMP Unknown)   SpO2 99%   BMI 25.62 kg/m     Estimated body mass index is 25.62 kg/m  as calculated from the following:    Height as of this encounter: 1.58 m (5' 2.21\").    Weight as " of this encounter: 64 kg (141 lb).    Physical Exam  GENERAL: alert and no distress  EYES: Eyes grossly normal to inspection, and conjunctivae and sclerae normal  HENT: ear canals and TM's normal, nose and mouth without ulcers or lesions  NECK: no adenopathy, no asymmetry, masses, or scars  RESP: lungs clear to auscultation - no rales, rhonchi or wheezes  CV: regular rate and rhythm, normal S1 S2, no S3 or S4, no murmur, click or rub, no peripheral edema  ABDOMEN: soft, nontender, no hepatosplenomegaly, no masses   MS: no gross musculoskeletal defects noted, no edema  SKIN: no suspicious lesions or rashes  NEURO: Normal strength and tone, mentation intact and speech normal  PSYCH: mentation appears normal, affect normal/bright        7/8/2024   Mini Cog   Clock Draw Score 2 Normal   3 Item Recall 3 objects recalled   Mini Cog Total Score 5                 Signed Electronically by: Deirdre E. Milligan, MD

## 2024-07-08 NOTE — PATIENT INSTRUCTIONS
Patient Education   Preventive Care Advice   This is general advice given by our system to help you stay healthy. However, your care team may have specific advice just for you. Please talk to your care team about your preventive care needs.  Nutrition  Eat 5 or more servings of fruits and vegetables each day.  Try wheat bread, brown rice and whole grain pasta (instead of white bread, rice, and pasta).  Get enough calcium and vitamin D. Check the label on foods and aim for 100% of the RDA (recommended daily allowance).  Lifestyle  Exercise at least 150 minutes each week  (30 minutes a day, 5 days a week).  Do muscle strengthening activities 2 days a week. These help control your weight and prevent disease.  No smoking.  Wear sunscreen to prevent skin cancer.  Have a dental exam and cleaning every 6 months.  Yearly exams  See your health care team every year to talk about:  Any changes in your health.  Any medicines your care team has prescribed.  Preventive care, family planning, and ways to prevent chronic diseases.  Shots (vaccines)   HPV shots (up to age 26), if you've never had them before.  Hepatitis B shots (up to age 59), if you've never had them before.  COVID-19 shot: Get this shot when it's due.  Flu shot: Get a flu shot every year.  Tetanus shot: Get a tetanus shot every 10 years.  Pneumococcal, hepatitis A, and RSV shots: Ask your care team if you need these based on your risk.  Shingles shot (for age 50 and up)  General health tests  Diabetes screening:  Starting at age 35, Get screened for diabetes at least every 3 years.  If you are younger than age 35, ask your care team if you should be screened for diabetes.  Cholesterol test: At age 39, start having a cholesterol test every 5 years, or more often if advised.  Bone density scan (DEXA): At age 50, ask your care team if you should have this scan for osteoporosis (brittle bones).  Hepatitis C: Get tested at least once in your life.  STIs (sexually  transmitted infections)  Before age 24: Ask your care team if you should be screened for STIs.  After age 24: Get screened for STIs if you're at risk. You are at risk for STIs (including HIV) if:  You are sexually active with more than one person.  You don't use condoms every time.  You or a partner was diagnosed with a sexually transmitted infection.  If you are at risk for HIV, ask about PrEP medicine to prevent HIV.  Get tested for HIV at least once in your life, whether you are at risk for HIV or not.  Cancer screening tests  Cervical cancer screening: If you have a cervix, begin getting regular cervical cancer screening tests starting at age 21.  Breast cancer scan (mammogram): If you've ever had breasts, begin having regular mammograms starting at age 40. This is a scan to check for breast cancer.  Colon cancer screening: It is important to start screening for colon cancer at age 45.  Have a colonoscopy test every 10 years (or more often if you're at risk) Or, ask your provider about stool tests like a FIT test every year or Cologuard test every 3 years.  To learn more about your testing options, visit:   .  For help making a decision, visit:   https://bit.ly/qj41517.  Prostate cancer screening test: If you have a prostate, ask your care team if a prostate cancer screening test (PSA) at age 55 is right for you.  Lung cancer screening: If you are a current or former smoker ages 50 to 80, ask your care team if ongoing lung cancer screenings are right for you.  For informational purposes only. Not to replace the advice of your health care provider. Copyright   2023 [facility]. All rights reserved. Clinically reviewed by the [facility]. Jianjian 036025 - REV 01/24.  Learning About Stress  What is stress?     Stress is your body's response to a hard situation. Your body can have a physical, emotional, or mental response. Stress is a fact of life for most people, and it  affects everyone differently. What causes stress for you may not be stressful for someone else.  A lot of things can cause stress. You may feel stress when you go on a job interview, take a test, or run a race. This kind of short-term stress is normal and even useful. It can help you if you need to work hard or react quickly. For example, stress can help you finish an important job on time.  Long-term stress is caused by ongoing stressful situations or events. Examples of long-term stress include long-term health problems, ongoing problems at work, or conflicts in your family. Long-term stress can harm your health.  How does stress affect your health?  When you are stressed, your body responds as though you are in danger. It makes hormones that speed up your heart, make you breathe faster, and give you a burst of energy. This is called the fight-or-flight stress response. If the stress is over quickly, your body goes back to normal and no harm is done.  But if stress happens too often or lasts too long, it can have bad effects. Long-term stress can make you more likely to get sick, and it can make symptoms of some diseases worse. If you tense up when you are stressed, you may develop neck, shoulder, or low back pain. Stress is linked to high blood pressure and heart disease.  Stress also harms your emotional health. It can make you rossi, tense, or depressed. Your relationships may suffer, and you may not do well at work or school.  What can you do to manage stress?  You can try these things to help manage stress:   Do something active. Exercise or activity can help reduce stress. Walking is a great way to get started. Even everyday activities such as housecleaning or yard work can help.  Try yoga or otoniel chi. These techniques combine exercise and meditation. You may need some training at first to learn them.  Do something you enjoy. For example, listen to music or go to a movie. Practice your hobby or do volunteer  "work.  Meditate. This can help you relax, because you are not worrying about what happened before or what may happen in the future.  Do guided imagery. Imagine yourself in any setting that helps you feel calm. You can use online videos, books, or a teacher to guide you.  Do breathing exercises. For example:  From a standing position, bend forward from the waist with your knees slightly bent. Let your arms dangle close to the floor.  Breathe in slowly and deeply as you return to a standing position. Roll up slowly and lift your head last.  Hold your breath for just a few seconds in the standing position.  Breathe out slowly and bend forward from the waist.  Let your feelings out. Talk, laugh, cry, and express anger when you need to. Talking with supportive friends or family, a counselor, or a curtis leader about your feelings is a healthy way to relieve stress. Avoid discussing your feelings with people who make you feel worse.  Write. It may help to write about things that are bothering you. This helps you find out how much stress you feel and what is causing it. When you know this, you can find better ways to cope.  What can you do to prevent stress?  You might try some of these things to help prevent stress:  Manage your time. This helps you find time to do the things you want and need to do.  Get enough sleep. Your body recovers from the stresses of the day while you are sleeping.  Get support. Your family, friends, and community can make a difference in how you experience stress.  Limit your news feed. Avoid or limit time on social media or news that may make you feel stressed.  Do something active. Exercise or activity can help reduce stress. Walking is a great way to get started.  Where can you learn more?  Go to https://www.healthwise.net/patiented  Enter N032 in the search box to learn more about \"Learning About Stress.\"  Current as of: October 24, 2023               Content Version: 14.0    9029-9504 " [employer].   Care instructions adapted under license by your healthcare professional. If you have questions about a medical condition or this instruction, always ask your healthcare professional. [employer] disclaims any warranty or liability for your use of this information.      Learning About Sleeping Well  What does sleeping well mean?     Sleeping well means getting enough sleep to feel good and stay healthy. How much sleep is enough varies among people.  The number of hours you sleep and how you feel when you wake up are both important. If you do not feel refreshed, you probably need more sleep. Another sign of not getting enough sleep is feeling tired during the day.  Experts recommend that adults get at least 7 or more hours of sleep per day. Children and older adults need more sleep.  Why is getting enough sleep important?  Getting enough quality sleep is a basic part of good health. When your sleep suffers, your physical health, mood, and your thoughts can suffer too. You may find yourself feeling more grumpy or stressed. Not getting enough sleep also can lead to serious problems, including injury, accidents, anxiety, and depression.  What might cause poor sleeping?  Many things can cause sleep problems, including:  Changes to your sleep schedule.  Stress. Stress can be caused by fear about a single event, such as giving a speech. Or you may have ongoing stress, such as worry about work or school.  Depression, anxiety, and other mental or emotional conditions.  Changes in your sleep habits or surroundings. This includes changes that happen where you sleep, such as noise, light, or sleeping in a different bed. It also includes changes in your sleep pattern, such as having jet lag or working a late shift.  Health problems, such as pain, breathing problems, and restless legs syndrome.  Lack of regular exercise.  Using alcohol, nicotine, or caffeine before bed.  How can you help  "yourself?  Here are some tips that may help you sleep more soundly and wake up feeling more refreshed.  Your sleeping area   Use your bedroom only for sleeping and sex. A bit of light reading may help you fall asleep. But if it doesn't, do your reading elsewhere in the house. Try not to use your TV, computer, smartphone, or tablet while you are in bed.  Be sure your bed is big enough to stretch out comfortably, especially if you have a sleep partner.  Keep your bedroom quiet, dark, and cool. Use curtains, blinds, or a sleep mask to block out light. To block out noise, use earplugs, soothing music, or a \"white noise\" machine.  Your evening and bedtime routine   Create a relaxing bedtime routine. You might want to take a warm shower or bath, or listen to soothing music.  Go to bed at the same time every night. And get up at the same time every morning, even if you feel tired.  What to avoid   Limit caffeine (coffee, tea, caffeinated sodas) during the day, and don't have any for at least 6 hours before bedtime.  Avoid drinking alcohol before bedtime. Alcohol can cause you to wake up more often during the night.  Try not to smoke or use tobacco, especially in the evening. Nicotine can keep you awake.  Limit naps during the day, especially close to bedtime.  Avoid lying in bed awake for too long. If you can't fall asleep or if you wake up in the middle of the night and can't get back to sleep within about 20 minutes, get out of bed and go to another room until you feel sleepy.  Avoid taking medicine right before bed that may keep you awake or make you feel hyper or energized. Your doctor can tell you if your medicine may do this and if you can take it earlier in the day.  If you can't sleep   Imagine yourself in a peaceful, pleasant scene. Focus on the details and feelings of being in a place that is relaxing.  Get up and do a quiet or boring activity until you feel sleepy.  Avoid drinking any liquids before going to bed " "to help prevent waking up often to use the bathroom.  Where can you learn more?  Go to https://www.[employer].net/patiented  Enter J942 in the search box to learn more about \"Learning About Sleeping Well.\"  Current as of: July 10, 2023               Content Version: 14.0    8858-8415 [employer].   Care instructions adapted under license by your healthcare professional. If you have questions about a medical condition or this instruction, always ask your healthcare professional. [employer] disclaims any warranty or liability for your use of this information.      Bladder Training: Care Instructions  Your Care Instructions     Bladder training is used to treat urge incontinence and stress incontinence. Urge incontinence means that the need to urinate comes on so fast that you can't get to a toilet in time. Stress incontinence means that you leak urine because of pressure on your bladder. For example, it may happen when you laugh, cough, or lift something heavy.  Bladder training can increase how long you can wait before you have to urinate. It can also help your bladder hold more urine. And it can give you better control over the urge to urinate.  It is important to remember that bladder training takes a few weeks to a few months to make a difference. You may not see results right away, but don't give up.  Follow-up care is a key part of your treatment and safety. Be sure to make and go to all appointments, and call your doctor if you are having problems. It's also a good idea to know your test results and keep a list of the medicines you take.  How can you care for yourself at home?  Work with your doctor to come up with a bladder training program that is right for you. You may use one or more of the following methods.  Delayed urination  In the beginning, try to keep from urinating for 5 minutes after you first feel the need to go.  While you wait, take deep, slow breaths to relax. Sandra " "exercises can also help you delay the need to go to the bathroom.  After some practice, when you can easily wait 5 minutes to urinate, try to wait 10 minutes before you urinate.  Slowly increase the waiting period until you are able to control when you have to urinate.  Scheduled urination  Empty your bladder when you first wake up in the morning.  Schedule times throughout the day when you will urinate.  Start by going to the bathroom every hour, even if you don't need to go.  Slowly increase the time between trips to the bathroom.  When you have found a schedule that works well for you, keep doing it.  If you wake up during the night and have to urinate, do it. Apply your schedule to waking hours only.  Kegel exercises  These tighten and strengthen pelvic muscles, which can help you control the flow of urine. (If doing these exercises causes pain, stop doing them and talk with your doctor.) To do Kegel exercises:  Squeeze your muscles as if you were trying not to pass gas. Or squeeze your muscles as if you were stopping the flow of urine. Your belly, legs, and buttocks shouldn't move.  Hold the squeeze for 3 seconds, then relax for 5 to 10 seconds.  Start with 3 seconds, then add 1 second each week until you are able to squeeze for 10 seconds.  Repeat the exercise 10 times a session. Do 3 to 8 sessions a day.  When should you call for help?  Watch closely for changes in your health, and be sure to contact your doctor if:    Your incontinence is getting worse.     You do not get better as expected.   Where can you learn more?  Go to https://www.[employer].net/patiented  Enter V684 in the search box to learn more about \"Bladder Training: Care Instructions.\"  Current as of: November 15, 2023               Content Version: 14.0    2163-6380 [employer].   Care instructions adapted under license by your healthcare professional. If you have questions about a medical condition or this instruction, always ask " your healthcare professional. [employer] disclaims any warranty or liability for your use of this information.

## 2024-08-15 DIAGNOSIS — I10 BENIGN ESSENTIAL HYPERTENSION: ICD-10-CM

## 2024-08-15 RX ORDER — HYDROCHLOROTHIAZIDE 12.5 MG/1
25 CAPSULE ORAL DAILY
Qty: 90 CAPSULE | Refills: 4 | OUTPATIENT
Start: 2024-08-15

## 2024-08-21 DIAGNOSIS — I10 BENIGN ESSENTIAL HYPERTENSION: ICD-10-CM

## 2024-08-22 RX ORDER — HYDROCHLOROTHIAZIDE 12.5 MG/1
25 CAPSULE ORAL DAILY
Qty: 180 CAPSULE | Refills: 4 | Status: SHIPPED | OUTPATIENT
Start: 2024-08-22

## 2024-11-15 DIAGNOSIS — I10 BENIGN ESSENTIAL HYPERTENSION: ICD-10-CM

## 2024-11-15 RX ORDER — AMLODIPINE BESYLATE 5 MG/1
5 TABLET ORAL DAILY
Qty: 90 TABLET | Refills: 4 | Status: SHIPPED | OUTPATIENT
Start: 2024-11-15

## 2025-02-09 DIAGNOSIS — E26.9 HYPERALDOSTERONISM: ICD-10-CM

## 2025-02-09 DIAGNOSIS — I10 BENIGN ESSENTIAL HYPERTENSION: ICD-10-CM

## 2025-02-10 RX ORDER — SPIRONOLACTONE 50 MG/1
50 TABLET, FILM COATED ORAL DAILY
Qty: 90 TABLET | Refills: 4 | Status: SHIPPED | OUTPATIENT
Start: 2025-02-10

## 2025-02-10 NOTE — TELEPHONE ENCOUNTER
RN called and spoke with patient. Patient has been taking spironolactone (ALDACTONE) 50 MG tablet consistently daily since Saturday. Patient ran out of medication on Saturday.     Please refill if appropriate.     Sctot CHOW RN 2/10/2025 at 1:15 PM

## 2025-02-10 NOTE — TELEPHONE ENCOUNTER
Clinic RN: Please investigate patient's chart or contact patient if the information cannot be found because patient should have run out of this medication on 10/16/2024. Confirm patient is taking this medication as prescribed. Document findings and route refill encounter to provider for approval or denial.  Concha Sam RN, BSN  [facility]

## 2025-03-10 ENCOUNTER — OFFICE VISIT (OUTPATIENT)
Dept: PEDIATRICS | Facility: CLINIC | Age: 70
End: 2025-03-10
Payer: COMMERCIAL

## 2025-03-10 VITALS
DIASTOLIC BLOOD PRESSURE: 78 MMHG | WEIGHT: 143.8 LBS | SYSTOLIC BLOOD PRESSURE: 128 MMHG | TEMPERATURE: 98 F | HEART RATE: 63 BPM | HEIGHT: 62 IN | BODY MASS INDEX: 26.46 KG/M2 | OXYGEN SATURATION: 99 % | RESPIRATION RATE: 18 BRPM

## 2025-03-10 DIAGNOSIS — Z78.0 ASYMPTOMATIC MENOPAUSAL STATE: ICD-10-CM

## 2025-03-10 DIAGNOSIS — N18.32 STAGE 3B CHRONIC KIDNEY DISEASE (H): ICD-10-CM

## 2025-03-10 DIAGNOSIS — M85.80 OSTEOPENIA, UNSPECIFIED LOCATION: ICD-10-CM

## 2025-03-10 DIAGNOSIS — R30.0 DYSURIA: Primary | ICD-10-CM

## 2025-03-10 DIAGNOSIS — Z00.00 LABORATORY EXAMINATION ORDERED AS PART OF A ROUTINE GENERAL MEDICAL EXAMINATION: ICD-10-CM

## 2025-03-10 DIAGNOSIS — R10.31 RIGHT LOWER QUADRANT PAIN: ICD-10-CM

## 2025-03-10 DIAGNOSIS — Z90.49 HISTORY OF APPENDECTOMY: ICD-10-CM

## 2025-03-10 DIAGNOSIS — R14.0 ABDOMINAL BLOATING: ICD-10-CM

## 2025-03-10 LAB
ALBUMIN UR-MCNC: NEGATIVE MG/DL
APPEARANCE UR: CLEAR
BACTERIA #/AREA URNS HPF: ABNORMAL /HPF
BILIRUB UR QL STRIP: NEGATIVE
COLOR UR AUTO: YELLOW
GLUCOSE UR STRIP-MCNC: NEGATIVE MG/DL
HGB BLD-MCNC: 14.4 G/DL (ref 11.7–15.7)
HGB UR QL STRIP: NEGATIVE
KETONES UR STRIP-MCNC: NEGATIVE MG/DL
LEUKOCYTE ESTERASE UR QL STRIP: NEGATIVE
MUCOUS THREADS #/AREA URNS LPF: PRESENT /LPF
NITRATE UR QL: NEGATIVE
PH UR STRIP: 5.5 [PH] (ref 5–7)
RBC #/AREA URNS AUTO: ABNORMAL /HPF
SP GR UR STRIP: 1.02 (ref 1–1.03)
SQUAMOUS #/AREA URNS AUTO: ABNORMAL /LPF
UROBILINOGEN UR STRIP-ACNC: 0.2 E.U./DL
WBC #/AREA URNS AUTO: ABNORMAL /HPF

## 2025-03-10 PROCEDURE — 84550 ASSAY OF BLOOD/URIC ACID: CPT | Performed by: STUDENT IN AN ORGANIZED HEALTH CARE EDUCATION/TRAINING PROGRAM

## 2025-03-10 PROCEDURE — 82570 ASSAY OF URINE CREATININE: CPT | Performed by: STUDENT IN AN ORGANIZED HEALTH CARE EDUCATION/TRAINING PROGRAM

## 2025-03-10 PROCEDURE — 99214 OFFICE O/P EST MOD 30 MIN: CPT | Performed by: STUDENT IN AN ORGANIZED HEALTH CARE EDUCATION/TRAINING PROGRAM

## 2025-03-10 PROCEDURE — 3074F SYST BP LT 130 MM HG: CPT | Performed by: STUDENT IN AN ORGANIZED HEALTH CARE EDUCATION/TRAINING PROGRAM

## 2025-03-10 PROCEDURE — 36415 COLL VENOUS BLD VENIPUNCTURE: CPT | Performed by: STUDENT IN AN ORGANIZED HEALTH CARE EDUCATION/TRAINING PROGRAM

## 2025-03-10 PROCEDURE — 82610 CYSTATIN C: CPT | Performed by: STUDENT IN AN ORGANIZED HEALTH CARE EDUCATION/TRAINING PROGRAM

## 2025-03-10 PROCEDURE — 85018 HEMOGLOBIN: CPT | Performed by: STUDENT IN AN ORGANIZED HEALTH CARE EDUCATION/TRAINING PROGRAM

## 2025-03-10 PROCEDURE — 82043 UR ALBUMIN QUANTITATIVE: CPT | Performed by: STUDENT IN AN ORGANIZED HEALTH CARE EDUCATION/TRAINING PROGRAM

## 2025-03-10 PROCEDURE — G2211 COMPLEX E/M VISIT ADD ON: HCPCS | Performed by: STUDENT IN AN ORGANIZED HEALTH CARE EDUCATION/TRAINING PROGRAM

## 2025-03-10 PROCEDURE — 3078F DIAST BP <80 MM HG: CPT | Performed by: STUDENT IN AN ORGANIZED HEALTH CARE EDUCATION/TRAINING PROGRAM

## 2025-03-10 PROCEDURE — 81001 URINALYSIS AUTO W/SCOPE: CPT | Performed by: STUDENT IN AN ORGANIZED HEALTH CARE EDUCATION/TRAINING PROGRAM

## 2025-03-10 NOTE — PROGRESS NOTES
"  Assessment & Plan     Dysuria  Urinalysis not consistent with UTI (urinary tract infection) - no antibiotics prescribed.  - UA with Microscopic reflex to Culture - Clinic Collect  - UA Microscopic with Reflex to Culture    Stage 3b chronic kidney disease (H)  Follows with outside nephrology. Reports kidney disease started after contrast enhanced CT without hydration. On ACEi. Not on SGLT2i.   - Albumin Random Urine Quantitative with Creat Ratio; Future  - Hemoglobin; Future  - Cystatin C with GFR; Future  - Hemoglobin  - Cystatin C with GFR  - Albumin Random Urine Quantitative with Creat Ratio    Abdominal bloating  - Helicobacter pylori Antigen Stool; Future  - Helicobacter pylori Antigen Stool    History of appendectomy  Right lower quadrant pain  Patient with positional sharp pain in RLQ over surgical incision from appendectomy in 2011.   - Adult Gen Surg  Referral; Future    Osteopenia, unspecified location  - DX Bone Density; Future    Laboratory examination ordered as part of a routine general medical examination  Not currently on urate lowering therapy.  - Uric acid; Future  - Uric acid    Asymptomatic menopausal state  History osteopenia.  - DX Bone Density; Future      Follow up annual  -nephrology appointment? SGLT2i?    BMI  Estimated body mass index is 26.13 kg/m  as calculated from the following:    Height as of this encounter: 1.58 m (5' 2.21\").    Weight as of this encounter: 65.2 kg (143 lb 12.8 oz).             Galen Victor is a 69 year old, presenting for the following health issues:  UTI (Has some dysuria during the day off and on) and Gastrointestinal Problem (Bloating after eating )      3/10/2025     3:05 PM   Additional Questions   Roomed by OH   Accompanied by Daughter         3/10/2025     3:05 PM   Patient Reported Additional Medications   Patient reports taking the following new medications None     UTI    History of Present Illness       Reason for visit:  To rule out a " "few possible conditions. glucose, h pylori, urinary tract health    She eats 2-3 servings of fruits and vegetables daily.She consumes 1 sweetened beverage(s) daily.She exercises with enough effort to increase her heart rate 10 to 19 minutes per day.  She exercises with enough effort to increase her heart rate 3 or less days per week.   She is taking medications regularly.                      Objective    /78 (BP Location: Right arm, Patient Position: Sitting, Cuff Size: Adult Regular)   Pulse 63   Temp 98  F (36.7  C) (Temporal)   Resp 18   Ht 1.58 m (5' 2.21\")   Wt 65.2 kg (143 lb 12.8 oz)   LMP  (LMP Unknown)   SpO2 99%   BMI 26.13 kg/m    Body mass index is 26.13 kg/m .  Physical Exam   GEN: Alert and appropriately interactive for age  EYES: Eyes grossly normal to inspection, conjunctivae and sclerae normal  RESP: Breathing comfortably on room air   CV: Warm and well perfused peripheral extremities   MS: no gross musculoskeletal defects noted, no edema  NEURO: Alert and oriented. Gait normal. Speech fluent.    PSYCH: Affect normal/bright. Appearance well groomed.               Signed Electronically by: Deirdre E. Milligan, MD    "

## 2025-03-11 ENCOUNTER — MYC MEDICAL ADVICE (OUTPATIENT)
Dept: PEDIATRICS | Facility: CLINIC | Age: 70
End: 2025-03-11
Payer: COMMERCIAL

## 2025-03-11 DIAGNOSIS — N18.32 STAGE 3B CHRONIC KIDNEY DISEASE (H): Primary | ICD-10-CM

## 2025-03-11 LAB
CREAT UR-MCNC: 188 MG/DL
CYSTATIN C (ROCHE): 1.3 MG/L (ref 0.6–1)
GFR/BSA.PRED SERPLBLD CYS-BASED-ARV: 49 ML/MIN/1.73M2
MICROALBUMIN UR-MCNC: <12 MG/L
MICROALBUMIN/CREAT UR: NORMAL MG/G{CREAT}
URATE SERPL-MCNC: 8.4 MG/DL (ref 2.4–5.7)

## 2025-03-12 NOTE — TELEPHONE ENCOUNTER
Dr. Milligan,     See mychart, RN pended lab per patient request. Please review prior to signing.  -Patient requests to repeat urine sample    Per pati 3/11/25  Milligan, Deirdre E, MD to Kayleigh Sousa       3/11/25  4:18 PM  The squamous cells, bacteria, and mucus all indicate it was not a clean sample and those are contaminants from your skin. RBCs are red blood cells. There was no indication of blood on the other part of the urinalysis so I am less concerned about this. We could repeat a urine sample test to see if RBCs are still present at your next visit.  Last read by Kayleigh Sousa at  7:53 PM on 3/11/2025.    JANAE Jurado, RN  [employer]

## 2025-03-17 LAB — H PYLORI AG STL QL IA: NEGATIVE

## 2025-03-24 ENCOUNTER — ANCILLARY PROCEDURE (OUTPATIENT)
Dept: BONE DENSITY | Facility: CLINIC | Age: 70
End: 2025-03-24
Attending: STUDENT IN AN ORGANIZED HEALTH CARE EDUCATION/TRAINING PROGRAM
Payer: COMMERCIAL

## 2025-03-24 DIAGNOSIS — M85.80 OSTEOPENIA, UNSPECIFIED LOCATION: ICD-10-CM

## 2025-03-24 DIAGNOSIS — Z78.0 ASYMPTOMATIC MENOPAUSAL STATE: ICD-10-CM

## 2025-03-24 PROCEDURE — 77080 DXA BONE DENSITY AXIAL: CPT | Mod: TC | Performed by: RADIOLOGY

## 2025-04-14 ENCOUNTER — PATIENT OUTREACH (OUTPATIENT)
Dept: GASTROENTEROLOGY | Facility: CLINIC | Age: 70
End: 2025-04-14
Payer: COMMERCIAL

## 2025-04-14 DIAGNOSIS — Z12.11 SPECIAL SCREENING FOR MALIGNANT NEOPLASMS, COLON: Primary | ICD-10-CM

## 2025-04-14 NOTE — PROGRESS NOTES
"Patient has a history of polyps and surveillance colonoscopy is due July 2025. Patient meets criteria for CRC high risk standing order protocol.    CRC Screening Colonoscopy Referral Review    Patient meets the inclusion criteria for screening colonoscopy standing order.    Ordering/Referring Provider:  Deirdre Milligan, MD    BMI: Estimated body mass index is 26.13 kg/m  as calculated from the following:    Height as of 3/10/25: 1.58 m (5' 2.21\").    Weight as of 3/10/25: 65.2 kg (143 lb 12.8 oz).     Sedation:  Does patient have any of the following conditions affecting sedation?  No medical conditions affecting sedation.    Previous Scopes:  Any previous recommendations or follow up needs based on previous scope?  na / No recommendations.    Medical Concerns to Postpone Order:  Does patient have any of the following medical concerns that should postpone/delay colonoscopy referral?  No medical conditions affecting colonoscopy referral.    Final Referral Details:  Based on patient's medical history patient is appropriate for referral order with moderate sedation. If patient's BMI > 50 do not schedule in ASC.  "

## 2025-04-15 ENCOUNTER — TRANSFERRED RECORDS (OUTPATIENT)
Dept: HEALTH INFORMATION MANAGEMENT | Facility: CLINIC | Age: 70
End: 2025-04-15

## 2025-05-27 ENCOUNTER — TELEPHONE (OUTPATIENT)
Dept: PEDIATRICS | Facility: CLINIC | Age: 70
End: 2025-05-27
Payer: COMMERCIAL

## 2025-05-27 NOTE — TELEPHONE ENCOUNTER
General Call      Reason for Call:  sent pt mcm to reply or call [phone] to reschedule Dr Milligan appt on 7/11

## 2025-07-11 PROBLEM — C50.912 INVASIVE DUCTAL CARCINOMA OF BREAST, FEMALE, LEFT (H): Status: RESOLVED | Noted: 2019-09-11 | Resolved: 2025-07-11

## 2025-07-14 ENCOUNTER — PATIENT OUTREACH (OUTPATIENT)
Dept: CARE COORDINATION | Facility: CLINIC | Age: 70
End: 2025-07-14
Payer: COMMERCIAL

## 2025-07-16 ENCOUNTER — OFFICE VISIT (OUTPATIENT)
Dept: OPTOMETRY | Facility: CLINIC | Age: 70
End: 2025-07-16
Payer: COMMERCIAL

## 2025-07-16 DIAGNOSIS — H26.9 BILATERAL INCIPIENT CATARACTS: ICD-10-CM

## 2025-07-16 DIAGNOSIS — H52.203 MYOPIA OF BOTH EYES WITH ASTIGMATISM: ICD-10-CM

## 2025-07-16 DIAGNOSIS — H52.13 MYOPIA OF BOTH EYES WITH ASTIGMATISM: ICD-10-CM

## 2025-07-16 DIAGNOSIS — H40.003 GLAUCOMA SUSPECT, BILATERAL: ICD-10-CM

## 2025-07-16 DIAGNOSIS — H52.4 PRESBYOPIA: Primary | ICD-10-CM

## 2025-07-16 PROCEDURE — 92014 COMPRE OPH EXAM EST PT 1/>: CPT | Performed by: OPTOMETRIST

## 2025-07-16 PROCEDURE — 92015 DETERMINE REFRACTIVE STATE: CPT | Performed by: OPTOMETRIST

## 2025-07-16 ASSESSMENT — VISUAL ACUITY
OS_CC: 20/40-2
CORRECTION_TYPE: GLASSES
OD_CC: 20/20
OD_CC: 20/40
METHOD: SNELLEN - LINEAR
OS_CC: 20/20
OD_CC+: -2

## 2025-07-16 ASSESSMENT — REFRACTION_WEARINGRX
OS_CYLINDER: +0.75
OS_SPHERE: -3.25
OD_CYLINDER: +0.25
SPECS_TYPE: PAL
OS_ADD: +2.50
OD_ADD: +2.50
OD_AXIS: 174
OS_AXIS: 067
OD_SPHERE: -3.25

## 2025-07-16 ASSESSMENT — REFRACTION_MANIFEST
OD_CYLINDER: +0.25
OS_ADD: +2.50
OD_SPHERE: -3.00
OS_CYLINDER: +0.75
OD_SPHERE: -2.25
OS_AXIS: 136
OS_CYLINDER: +0.50
METHOD_AUTOREFRACTION: 1
OS_SPHERE: -2.50
OS_AXIS: 065
OD_AXIS: 052
OD_CYLINDER: SPHERE
OS_SPHERE: -3.25
OD_ADD: +2.50

## 2025-07-16 ASSESSMENT — KERATOMETRY
OD_AXISANGLE_DEGREES: 84
OD_K1POWER_DIOPTERS: 44.37
OS_K2POWER_DIOPTERS: 45.12
OS_AXISANGLE_DEGREES: 67
OS_AXISANGLE2_DEGREES: 157
OS_K1POWER_DIOPTERS: 44.12
OD_K2POWER_DIOPTERS: 45.50
OD_AXISANGLE2_DEGREES: 174

## 2025-07-16 ASSESSMENT — EXTERNAL EXAM - RIGHT EYE: OD_EXAM: NORMAL

## 2025-07-16 ASSESSMENT — CONF VISUAL FIELD
OS_NORMAL: 1
OD_SUPERIOR_TEMPORAL_RESTRICTION: 0
METHOD: COUNTING FINGERS
OS_SUPERIOR_NASAL_RESTRICTION: 0
OS_INFERIOR_TEMPORAL_RESTRICTION: 0
OS_SUPERIOR_TEMPORAL_RESTRICTION: 0
OD_SUPERIOR_NASAL_RESTRICTION: 0
OD_NORMAL: 1
OS_INFERIOR_NASAL_RESTRICTION: 0
OD_INFERIOR_NASAL_RESTRICTION: 0
OD_INFERIOR_TEMPORAL_RESTRICTION: 0

## 2025-07-16 ASSESSMENT — TONOMETRY
OS_IOP_MMHG: 16
IOP_METHOD: APPLANATION
OD_IOP_MMHG: 16

## 2025-07-16 ASSESSMENT — CUP TO DISC RATIO
OS_RATIO: 0.45
OD_RATIO: 0.5

## 2025-07-16 ASSESSMENT — SLIT LAMP EXAM - LIDS
COMMENTS: NORMAL
COMMENTS: NORMAL

## 2025-07-16 ASSESSMENT — EXTERNAL EXAM - LEFT EYE: OS_EXAM: NORMAL

## 2025-07-16 NOTE — LETTER
7/16/2025      Kayleigh Sousa  [address] [place] [address]      Dear Colleague,    Thank you for referring your patient, Kayleigh Sousa, to the [facility]. Please see a copy of my visit note below.    Chief Complaint   Patient presents with     Annual Eye Exam        Last Eye Exam: 05/2024  Dilated Previously: Yes, side effects of dilation explained today    What are you currently using to see?  glasses       Distance Vision Acuity: Satisfied with vision    Near Vision Acuity: Satisfied with vision while reading and using computer with glasses    Eye Comfort: good  Do you use eye drops? : No      Paige Cobos - Optometric Assistant       Pohx   Glaucoma suspect in the past, low risk without cupping changes for a decade and stable IOP 0.5-0.55 right eye , 0.45 left eye   fohx glaucoma mother  Zoster dermatitis R  Posterior vitreous detachment   Medical, surgical and family histories reviewed and updated 7/16/2025.     Breast CA    OBJECTIVE: See Ophthalmology exam    ASSESSMENT:    ICD-10-CM    1. Presbyopia  H52.4       2. Myopia of both eyes with astigmatism  H52.13     H52.203       3. Bilateral incipient cataracts  H26.9       4. Glaucoma suspect, bilateral  H40.003       Based on cupping and fohx with normal tensions appears stable low risk  No prescription change needed     PLAN:   Repeat vf and oct at [facility] as it has been years     Sissy Lerma OD     Again, thank you for allowing me to participate in the care of your patient.        Sincerely,        Sissy Lerma, OD    Electronically signed

## 2025-07-16 NOTE — PROGRESS NOTES
Chief Complaint   Patient presents with    Annual Eye Exam        Last Eye Exam: 05/2024  Dilated Previously: Yes, side effects of dilation explained today    What are you currently using to see?  glasses       Distance Vision Acuity: Satisfied with vision    Near Vision Acuity: Satisfied with vision while reading and using computer with glasses    Eye Comfort: good  Do you use eye drops? : No      Paige Cobos - Optometric Assistant       Pohx   Glaucoma suspect in the past, low risk without cupping changes for a decade and stable IOP 0.5-0.55 right eye , 0.45 left eye   fohx glaucoma mother  Zoster dermatitis R  Posterior vitreous detachment   Medical, surgical and family histories reviewed and updated 7/16/2025.     Breast CA    OBJECTIVE: See Ophthalmology exam    ASSESSMENT:    ICD-10-CM    1. Presbyopia  H52.4       2. Myopia of both eyes with astigmatism  H52.13     H52.203       3. Bilateral incipient cataracts  H26.9       4. Glaucoma suspect, bilateral  H40.003       Based on cupping and fohx with normal tensions appears stable low risk  No prescription change needed     PLAN:   Repeat vf and oct at [email or web address] as it has been years     Sissy Lerma OD

## 2025-07-17 ENCOUNTER — PATIENT OUTREACH (OUTPATIENT)
Dept: CARE COORDINATION | Facility: CLINIC | Age: 70
End: 2025-07-17
Payer: COMMERCIAL

## 2025-07-21 ENCOUNTER — PATIENT OUTREACH (OUTPATIENT)
Dept: CARE COORDINATION | Facility: CLINIC | Age: 70
End: 2025-07-21
Payer: COMMERCIAL

## 2025-08-05 ENCOUNTER — PATIENT OUTREACH (OUTPATIENT)
Dept: CARE COORDINATION | Facility: CLINIC | Age: 70
End: 2025-08-05
Payer: COMMERCIAL

## 2025-08-11 ENCOUNTER — OFFICE VISIT (OUTPATIENT)
Dept: PEDIATRICS | Facility: CLINIC | Age: 70
End: 2025-08-11
Payer: COMMERCIAL

## 2025-08-11 VITALS
HEIGHT: 62 IN | TEMPERATURE: 98 F | DIASTOLIC BLOOD PRESSURE: 77 MMHG | OXYGEN SATURATION: 97 % | SYSTOLIC BLOOD PRESSURE: 123 MMHG | WEIGHT: 143.7 LBS | BODY MASS INDEX: 26.44 KG/M2 | RESPIRATION RATE: 18 BRPM | HEART RATE: 82 BPM

## 2025-08-11 DIAGNOSIS — N18.32 STAGE 3B CHRONIC KIDNEY DISEASE (H): Primary | ICD-10-CM

## 2025-08-11 DIAGNOSIS — Z23 NEED FOR VACCINATION: ICD-10-CM

## 2025-08-11 DIAGNOSIS — I10 BENIGN ESSENTIAL HYPERTENSION: ICD-10-CM

## 2025-08-11 DIAGNOSIS — Z85.3 HISTORY OF BREAST CANCER: ICD-10-CM

## 2025-08-11 PROBLEM — E87.6 HYPOKALEMIA: Status: RESOLVED | Noted: 2021-04-06 | Resolved: 2025-08-11

## 2025-08-11 PROBLEM — Z90.49 S/P APPENDECTOMY: Status: RESOLVED | Noted: 2023-06-30 | Resolved: 2025-08-11

## 2025-08-11 PROBLEM — Z90.13 S/P MASTECTOMY, BILATERAL: Status: RESOLVED | Noted: 2023-06-30 | Resolved: 2025-08-11

## 2025-08-11 LAB
ANION GAP SERPL CALCULATED.3IONS-SCNC: 13 MMOL/L (ref 7–15)
BUN SERPL-MCNC: 31.3 MG/DL (ref 8–23)
CALCIUM SERPL-MCNC: 10.1 MG/DL (ref 8.8–10.4)
CHLORIDE SERPL-SCNC: 104 MMOL/L (ref 98–107)
CREAT SERPL-MCNC: 1.4 MG/DL (ref 0.51–0.95)
EGFRCR SERPLBLD CKD-EPI 2021: 41 ML/MIN/1.73M2
GLUCOSE SERPL-MCNC: 100 MG/DL (ref 70–99)
HCO3 SERPL-SCNC: 24 MMOL/L (ref 22–29)
POTASSIUM SERPL-SCNC: 4.7 MMOL/L (ref 3.4–5.3)
SODIUM SERPL-SCNC: 141 MMOL/L (ref 135–145)

## 2025-08-11 PROCEDURE — 1126F AMNT PAIN NOTED NONE PRSNT: CPT | Performed by: INTERNAL MEDICINE

## 2025-08-11 PROCEDURE — 3078F DIAST BP <80 MM HG: CPT | Performed by: INTERNAL MEDICINE

## 2025-08-11 PROCEDURE — 80048 BASIC METABOLIC PNL TOTAL CA: CPT | Performed by: INTERNAL MEDICINE

## 2025-08-11 PROCEDURE — 99213 OFFICE O/P EST LOW 20 MIN: CPT | Performed by: INTERNAL MEDICINE

## 2025-08-11 PROCEDURE — 3074F SYST BP LT 130 MM HG: CPT | Performed by: INTERNAL MEDICINE

## 2025-08-11 PROCEDURE — 36415 COLL VENOUS BLD VENIPUNCTURE: CPT | Performed by: INTERNAL MEDICINE

## 2025-08-11 ASSESSMENT — PAIN SCALES - GENERAL: PAINLEVEL_OUTOF10: NO PAIN (0)

## 2025-08-12 ENCOUNTER — PATIENT OUTREACH (OUTPATIENT)
Dept: ONCOLOGY | Facility: CLINIC | Age: 70
End: 2025-08-12
Payer: COMMERCIAL

## 2025-08-13 ENCOUNTER — PATIENT OUTREACH (OUTPATIENT)
Dept: ONCOLOGY | Facility: CLINIC | Age: 70
End: 2025-08-13
Payer: COMMERCIAL